# Patient Record
Sex: FEMALE | Race: WHITE
[De-identification: names, ages, dates, MRNs, and addresses within clinical notes are randomized per-mention and may not be internally consistent; named-entity substitution may affect disease eponyms.]

---

## 2021-11-24 ENCOUNTER — TRANSCRIPTION ENCOUNTER (OUTPATIENT)
Age: 71
End: 2021-11-24

## 2021-11-24 ENCOUNTER — INPATIENT (INPATIENT)
Facility: HOSPITAL | Age: 71
LOS: 5 days | Discharge: HOME | End: 2021-11-30
Attending: COLON & RECTAL SURGERY | Admitting: COLON & RECTAL SURGERY
Payer: MEDICARE

## 2021-11-24 VITALS
HEART RATE: 114 BPM | WEIGHT: 169.98 LBS | OXYGEN SATURATION: 96 % | TEMPERATURE: 97 F | SYSTOLIC BLOOD PRESSURE: 219 MMHG | DIASTOLIC BLOOD PRESSURE: 98 MMHG | RESPIRATION RATE: 18 BRPM

## 2021-11-24 LAB
ALBUMIN SERPL ELPH-MCNC: 4.5 G/DL — SIGNIFICANT CHANGE UP (ref 3.5–5.2)
ALP SERPL-CCNC: 96 U/L — SIGNIFICANT CHANGE UP (ref 30–115)
ALT FLD-CCNC: 22 U/L — SIGNIFICANT CHANGE UP (ref 0–41)
ANION GAP SERPL CALC-SCNC: 16 MMOL/L — HIGH (ref 7–14)
APPEARANCE UR: ABNORMAL
APTT BLD: 30.5 SEC — SIGNIFICANT CHANGE UP (ref 27–39.2)
AST SERPL-CCNC: 23 U/L — SIGNIFICANT CHANGE UP (ref 0–41)
BACTERIA # UR AUTO: ABNORMAL
BASOPHILS # BLD AUTO: 0.02 K/UL — SIGNIFICANT CHANGE UP (ref 0–0.2)
BASOPHILS NFR BLD AUTO: 0.2 % — SIGNIFICANT CHANGE UP (ref 0–1)
BILIRUB SERPL-MCNC: 1.4 MG/DL — HIGH (ref 0.2–1.2)
BILIRUB UR-MCNC: ABNORMAL
BLD GP AB SCN SERPL QL: SIGNIFICANT CHANGE UP
BLD GP AB SCN SERPL QL: SIGNIFICANT CHANGE UP
BUN SERPL-MCNC: 19 MG/DL — SIGNIFICANT CHANGE UP (ref 10–20)
CALCIUM SERPL-MCNC: 9.2 MG/DL — SIGNIFICANT CHANGE UP (ref 8.5–10.1)
CHLORIDE SERPL-SCNC: 92 MMOL/L — LOW (ref 98–110)
CO2 SERPL-SCNC: 21 MMOL/L — SIGNIFICANT CHANGE UP (ref 17–32)
COLOR SPEC: ABNORMAL
CREAT SERPL-MCNC: 0.9 MG/DL — SIGNIFICANT CHANGE UP (ref 0.7–1.5)
DIFF PNL FLD: NEGATIVE — SIGNIFICANT CHANGE UP
EOSINOPHIL # BLD AUTO: 0 K/UL — SIGNIFICANT CHANGE UP (ref 0–0.7)
EOSINOPHIL NFR BLD AUTO: 0 % — SIGNIFICANT CHANGE UP (ref 0–8)
EPI CELLS # UR: 3 /HPF — SIGNIFICANT CHANGE UP (ref 0–5)
GLUCOSE SERPL-MCNC: 155 MG/DL — HIGH (ref 70–99)
GLUCOSE UR QL: ABNORMAL
HCT VFR BLD CALC: 47.6 % — HIGH (ref 37–47)
HGB BLD-MCNC: 16.7 G/DL — HIGH (ref 12–16)
HYALINE CASTS # UR AUTO: 13 /LPF — HIGH (ref 0–7)
IMM GRANULOCYTES NFR BLD AUTO: 0.4 % — HIGH (ref 0.1–0.3)
INR BLD: 1.14 RATIO — SIGNIFICANT CHANGE UP (ref 0.65–1.3)
KETONES UR-MCNC: NEGATIVE — SIGNIFICANT CHANGE UP
LACTATE SERPL-SCNC: 1.6 MMOL/L — SIGNIFICANT CHANGE UP (ref 0.7–2)
LEUKOCYTE ESTERASE UR-ACNC: NEGATIVE — SIGNIFICANT CHANGE UP
LIDOCAIN IGE QN: 60 U/L — SIGNIFICANT CHANGE UP (ref 7–60)
LYMPHOCYTES # BLD AUTO: 0.9 K/UL — LOW (ref 1.2–3.4)
LYMPHOCYTES # BLD AUTO: 7.4 % — LOW (ref 20.5–51.1)
MCHC RBC-ENTMCNC: 28.9 PG — SIGNIFICANT CHANGE UP (ref 27–31)
MCHC RBC-ENTMCNC: 35.1 G/DL — SIGNIFICANT CHANGE UP (ref 32–37)
MCV RBC AUTO: 82.5 FL — SIGNIFICANT CHANGE UP (ref 81–99)
MONOCYTES # BLD AUTO: 0.85 K/UL — HIGH (ref 0.1–0.6)
MONOCYTES NFR BLD AUTO: 7 % — SIGNIFICANT CHANGE UP (ref 1.7–9.3)
NEUTROPHILS # BLD AUTO: 10.38 K/UL — HIGH (ref 1.4–6.5)
NEUTROPHILS NFR BLD AUTO: 85 % — HIGH (ref 42.2–75.2)
NITRITE UR-MCNC: NEGATIVE — SIGNIFICANT CHANGE UP
NRBC # BLD: 0 /100 WBCS — SIGNIFICANT CHANGE UP (ref 0–0)
PH UR: 6 — SIGNIFICANT CHANGE UP (ref 5–8)
PLATELET # BLD AUTO: 308 K/UL — SIGNIFICANT CHANGE UP (ref 130–400)
POTASSIUM SERPL-MCNC: 4 MMOL/L — SIGNIFICANT CHANGE UP (ref 3.5–5)
POTASSIUM SERPL-SCNC: 4 MMOL/L — SIGNIFICANT CHANGE UP (ref 3.5–5)
PROT SERPL-MCNC: 6.8 G/DL — SIGNIFICANT CHANGE UP (ref 6–8)
PROT UR-MCNC: ABNORMAL
PROTHROM AB SERPL-ACNC: 13.1 SEC — HIGH (ref 9.95–12.87)
RAPID RVP RESULT: SIGNIFICANT CHANGE UP
RBC # BLD: 5.77 M/UL — HIGH (ref 4.2–5.4)
RBC # FLD: 13.2 % — SIGNIFICANT CHANGE UP (ref 11.5–14.5)
RBC CASTS # UR COMP ASSIST: 3 /HPF — SIGNIFICANT CHANGE UP (ref 0–4)
SARS-COV-2 RNA SPEC QL NAA+PROBE: SIGNIFICANT CHANGE UP
SODIUM SERPL-SCNC: 129 MMOL/L — LOW (ref 135–146)
SP GR SPEC: 1.04 — HIGH (ref 1.01–1.03)
UROBILINOGEN FLD QL: ABNORMAL
WBC # BLD: 12.2 K/UL — HIGH (ref 4.8–10.8)
WBC # FLD AUTO: 12.2 K/UL — HIGH (ref 4.8–10.8)
WBC UR QL: 47 /HPF — HIGH (ref 0–5)

## 2021-11-24 PROCEDURE — 71045 X-RAY EXAM CHEST 1 VIEW: CPT | Mod: 26

## 2021-11-24 PROCEDURE — 99221 1ST HOSP IP/OBS SF/LOW 40: CPT

## 2021-11-24 PROCEDURE — 99285 EMERGENCY DEPT VISIT HI MDM: CPT

## 2021-11-24 PROCEDURE — 44188 LAP COLOSTOMY: CPT

## 2021-11-24 PROCEDURE — 99284 EMERGENCY DEPT VISIT MOD MDM: CPT | Mod: 57

## 2021-11-24 PROCEDURE — 93010 ELECTROCARDIOGRAM REPORT: CPT

## 2021-11-24 PROCEDURE — 74177 CT ABD & PELVIS W/CONTRAST: CPT | Mod: 26,MA

## 2021-11-24 PROCEDURE — 99223 1ST HOSP IP/OBS HIGH 75: CPT

## 2021-11-24 RX ORDER — ENOXAPARIN SODIUM 100 MG/ML
40 INJECTION SUBCUTANEOUS DAILY
Refills: 0 | Status: DISCONTINUED | OUTPATIENT
Start: 2021-11-24 | End: 2021-11-30

## 2021-11-24 RX ORDER — SODIUM CHLORIDE 9 MG/ML
1000 INJECTION INTRAMUSCULAR; INTRAVENOUS; SUBCUTANEOUS ONCE
Refills: 0 | Status: COMPLETED | OUTPATIENT
Start: 2021-11-24 | End: 2021-11-24

## 2021-11-24 RX ORDER — PANTOPRAZOLE SODIUM 20 MG/1
40 TABLET, DELAYED RELEASE ORAL
Refills: 0 | Status: DISCONTINUED | OUTPATIENT
Start: 2021-11-24 | End: 2021-11-30

## 2021-11-24 RX ORDER — SODIUM CHLORIDE 9 MG/ML
1000 INJECTION, SOLUTION INTRAVENOUS
Refills: 0 | Status: DISCONTINUED | OUTPATIENT
Start: 2021-11-24 | End: 2021-11-24

## 2021-11-24 RX ORDER — HYDROMORPHONE HYDROCHLORIDE 2 MG/ML
0.5 INJECTION INTRAMUSCULAR; INTRAVENOUS; SUBCUTANEOUS EVERY 6 HOURS
Refills: 0 | Status: DISCONTINUED | OUTPATIENT
Start: 2021-11-24 | End: 2021-11-30

## 2021-11-24 RX ORDER — SODIUM CHLORIDE 9 MG/ML
1000 INJECTION, SOLUTION INTRAVENOUS
Refills: 0 | Status: DISCONTINUED | OUTPATIENT
Start: 2021-11-24 | End: 2021-11-26

## 2021-11-24 RX ORDER — ACETAMINOPHEN 500 MG
650 TABLET ORAL EVERY 6 HOURS
Refills: 0 | Status: DISCONTINUED | OUTPATIENT
Start: 2021-11-24 | End: 2021-11-30

## 2021-11-24 RX ORDER — CHLORHEXIDINE GLUCONATE 213 G/1000ML
1 SOLUTION TOPICAL
Refills: 0 | Status: DISCONTINUED | OUTPATIENT
Start: 2021-11-24 | End: 2021-11-30

## 2021-11-24 RX ORDER — PANTOPRAZOLE SODIUM 20 MG/1
40 TABLET, DELAYED RELEASE ORAL ONCE
Refills: 0 | Status: COMPLETED | OUTPATIENT
Start: 2021-11-24 | End: 2021-11-24

## 2021-11-24 RX ORDER — PANTOPRAZOLE SODIUM 20 MG/1
40 TABLET, DELAYED RELEASE ORAL
Refills: 0 | Status: DISCONTINUED | OUTPATIENT
Start: 2021-11-24 | End: 2021-11-24

## 2021-11-24 RX ORDER — HYDROMORPHONE HYDROCHLORIDE 2 MG/ML
0.5 INJECTION INTRAMUSCULAR; INTRAVENOUS; SUBCUTANEOUS
Refills: 0 | Status: DISCONTINUED | OUTPATIENT
Start: 2021-11-24 | End: 2021-11-24

## 2021-11-24 RX ORDER — HYDRALAZINE HCL 50 MG
10 TABLET ORAL ONCE
Refills: 0 | Status: COMPLETED | OUTPATIENT
Start: 2021-11-24 | End: 2021-11-24

## 2021-11-24 RX ORDER — INFLUENZA VIRUS VACCINE 15; 15; 15; 15 UG/.5ML; UG/.5ML; UG/.5ML; UG/.5ML
0.7 SUSPENSION INTRAMUSCULAR ONCE
Refills: 0 | Status: DISCONTINUED | OUTPATIENT
Start: 2021-11-24 | End: 2021-11-30

## 2021-11-24 RX ORDER — HEPARIN SODIUM 5000 [USP'U]/ML
5000 INJECTION INTRAVENOUS; SUBCUTANEOUS EVERY 8 HOURS
Refills: 0 | Status: DISCONTINUED | OUTPATIENT
Start: 2021-11-24 | End: 2021-11-24

## 2021-11-24 RX ORDER — SODIUM CHLORIDE 9 MG/ML
1000 INJECTION, SOLUTION INTRAVENOUS ONCE
Refills: 0 | Status: COMPLETED | OUTPATIENT
Start: 2021-11-24 | End: 2021-11-24

## 2021-11-24 RX ORDER — LABETALOL HCL 100 MG
10 TABLET ORAL ONCE
Refills: 0 | Status: DISCONTINUED | OUTPATIENT
Start: 2021-11-24 | End: 2021-11-24

## 2021-11-24 RX ORDER — LABETALOL HCL 100 MG
5 TABLET ORAL ONCE
Refills: 0 | Status: COMPLETED | OUTPATIENT
Start: 2021-11-24 | End: 2021-11-24

## 2021-11-24 RX ORDER — KETOROLAC TROMETHAMINE 30 MG/ML
15 SYRINGE (ML) INJECTION EVERY 8 HOURS
Refills: 0 | Status: DISCONTINUED | OUTPATIENT
Start: 2021-11-24 | End: 2021-11-24

## 2021-11-24 RX ORDER — CEFOTETAN DISODIUM 1 G
2 VIAL (EA) INJECTION EVERY 12 HOURS
Refills: 0 | Status: COMPLETED | OUTPATIENT
Start: 2021-11-24 | End: 2021-11-25

## 2021-11-24 RX ORDER — LABETALOL HCL 100 MG
5 TABLET ORAL ONCE
Refills: 0 | Status: DISCONTINUED | OUTPATIENT
Start: 2021-11-24 | End: 2021-11-24

## 2021-11-24 RX ADMIN — SODIUM CHLORIDE 1000 MILLILITER(S): 9 INJECTION INTRAMUSCULAR; INTRAVENOUS; SUBCUTANEOUS at 16:55

## 2021-11-24 RX ADMIN — HYDROMORPHONE HYDROCHLORIDE 0.5 MILLIGRAM(S): 2 INJECTION INTRAMUSCULAR; INTRAVENOUS; SUBCUTANEOUS at 22:30

## 2021-11-24 RX ADMIN — HYDROMORPHONE HYDROCHLORIDE 0.5 MILLIGRAM(S): 2 INJECTION INTRAMUSCULAR; INTRAVENOUS; SUBCUTANEOUS at 22:15

## 2021-11-24 RX ADMIN — HYDROMORPHONE HYDROCHLORIDE 0.5 MILLIGRAM(S): 2 INJECTION INTRAMUSCULAR; INTRAVENOUS; SUBCUTANEOUS at 22:05

## 2021-11-24 RX ADMIN — SODIUM CHLORIDE 150 MILLILITER(S): 9 INJECTION, SOLUTION INTRAVENOUS at 23:01

## 2021-11-24 RX ADMIN — Medication 15 MILLIGRAM(S): at 23:15

## 2021-11-24 RX ADMIN — PANTOPRAZOLE SODIUM 40 MILLIGRAM(S): 20 TABLET, DELAYED RELEASE ORAL at 16:32

## 2021-11-24 RX ADMIN — SODIUM CHLORIDE 1000 MILLILITER(S): 9 INJECTION INTRAMUSCULAR; INTRAVENOUS; SUBCUTANEOUS at 15:50

## 2021-11-24 RX ADMIN — Medication 10 MILLIGRAM(S): at 18:40

## 2021-11-24 RX ADMIN — Medication 5 MILLIGRAM(S): at 15:51

## 2021-11-24 RX ADMIN — SODIUM CHLORIDE 1000 MILLILITER(S): 9 INJECTION, SOLUTION INTRAVENOUS at 12:11

## 2021-11-24 RX ADMIN — Medication 15 MILLIGRAM(S): at 22:56

## 2021-11-24 RX ADMIN — SODIUM CHLORIDE 125 MILLILITER(S): 9 INJECTION, SOLUTION INTRAVENOUS at 22:26

## 2021-11-24 NOTE — ED PROVIDER NOTE - GASTROINTESTINAL, MLM
Abdomen soft, mildly distended, non-tender, no rebound, no guarding.  No CVA tenderness bilaterally.

## 2021-11-24 NOTE — H&P ADULT - ASSESSMENT
ASSESSMENT:  71yF who hasn't seen PCP in 40 years presents with distended abdomen, and not passing flatus and bowel movements. CT revealed sigmoid mass with large bowel obstruction. Of note right breast mass and left breast nodule seen.    PLAN:  - Pre-Op  - Clearances   - NGT to LCWS  - NPO  - IVF  - OR for laparoscopic creation of transverse loop colostomy       Above plan discussed with Attending Surgeon Dr. Fritz  11-24-21 @ 16:35

## 2021-11-24 NOTE — H&P ADULT - ATTENDING COMMENTS
71F with no known medical history who presents with abdominal pain distention constipation.  Found to have large bowel obstruction due to sigmoid mass and breast mass on CTAP.    Patient seen and examined.  Patient reports abdominal pain.    Abdomen - soft, distended, non tender.  No rebound or guarding    Vitals labs and images reviewed    WBC 12.2    CTAP - Large bowel obstruction secondary to a likely malignant stricture in the proximal sigmoid colon. Please correlate with correlate endoscopy.  Small volume ascites.  Lobulated, partially visualized right breast mass and left breast nodule. Physical exam correlation and correlation with dedicated breast imaging is recommended as malignancy is of concern.    Plan  - NPO  - IVF  - Pre operative labs  - To OR emergently for laparoscopic creation of loop colostomy.  All risks benefits and alternatives discussed with the patient. She was in agreement with the plan and signed surgical consent.

## 2021-11-24 NOTE — ED PROVIDER NOTE - OBJECTIVE STATEMENT
71 y.o. female without any known PMH (hasn't seen a DrLexis in 41 years) presented to the ER c/o abdominal bloating, poor appetite, and constipation for the past 4-5 days.  (+) nausea with vomiting Sat. which resolved.  Pt denies fever, chills, abdominal pain, dysuria, flank pain, hematuria, hematemesis, melena, chest pain, dizziness, SOB.  No other complaints.  Py referred to ER by [facility].

## 2021-11-24 NOTE — CONSULT NOTE ADULT - SUBJECTIVE AND OBJECTIVE BOX
KYLE BOYKININE  71y  Female      Patient is a 71y old  Female who presents with a chief complaint of Abd pain.      INTERVAL HPI/OVERNIGHT EVENTS:      ******************************* REVIEW OF SYSTEMS:**********************************************    All other review of systems negative    *********************** VITALS ******************************************    T(F): 97.5 (21 @ 18:20)  HR: 85 (21 @ 18:24) (85 - 114)  BP: 209/101 (21 @ 18:24) (121/101 - 230/110)  RR: 18 (21 @ 18:20) (17 - 18)  SpO2: 100% (21 @ 18:20) (96% - 100%)            ******************************** PHYSICAL EXAM:**************************************************  GENERAL: NAD    PSYCH: no agitation, baseline mentation  HEENT:     NERVOUS SYSTEM:  Alert & Oriented X3, MS  5/5 B/L  UE and LE ; Sensory intact    PULMONARY: SANDEEP, CTA    CARDIOVASCULAR: S1S2 RRR    GI: Soft, NT, Distended; BS present.    EXTREMITIES:  2+ Peripheral Pulses, No clubbing, cyanosis, or edema    LYMPH: No lymphadenopathy noted    SKIN: No rashes or lesions      **************************** LABS *******************************************************                          16.7   12.20 )-----------( 308      ( 2021 13:25 )             47.6     1124    129<L>  |  92<L>  |  19  ----------------------------<  155<H>  4.0   |  21  |  0.9    Ca    9.2      2021 11:30    TPro  6.8  /  Alb  4.5  /  TBili  1.4<H>  /  DBili  x   /  AST  23  /  ALT  22  /  AlkPhos  96  24      Urinalysis Basic - ( 2021 11:30 )    Color: Radha / Appearance: Slightly Turbid / S.036 / pH: x  Gluc: x / Ketone: Negative  / Bili: Small / Urobili: 6 mg/dL   Blood: x / Protein: 100 mg/dL / Nitrite: Negative   Leuk Esterase: Negative / RBC: 3 /HPF / WBC 47 /HPF   Sq Epi: x / Non Sq Epi: 3 /HPF / Bacteria: Few      PT/INR - ( 2021 15:55 )   PT: 13.10 sec;   INR: 1.14 ratio         PTT - ( 2021 15:55 )  PTT:30.5 sec  Lactate Trend   @ 11:30 Lactate:1.6         CAPILLARY BLOOD GLUCOSE              **************************Active Medications *******************************************  No Known Allergies      heparin   Injectable 5000 Unit(s) SubCutaneous every 8 hours  influenza  Vaccine (HIGH DOSE) 0.7 milliLiter(s) IntraMuscular once      ***************************************************  RADIOLOGY & ADDITIONAL TESTS:    Imaging Personally Reviewed:  [ ] YES  [ ] NO    HEALTH ISSUES - PROBLEM Dx:

## 2021-11-24 NOTE — CONSULT NOTE ADULT - SUBJECTIVE AND OBJECTIVE BOX
Gastroenterology Consultation:    Patient is a 71y old  Female who presents with a chief complaint of abdominal pain    Admitted on: 11-24-21  HPI:  71 y.o. female without any known PMH (hasn't seen a DrLexis in 41 years) presented to the ER c/o abdominal bloating, poor appetite, and constipation for the past 4-5 days. As per patient her last BM was last Thursday or Friday. She also endorses she dose not pass gas since jody time. She also complains of  (+) nausea with vomiting Sat. which resolved.  Pt denies fever, chills, abdominal pain, dysuria, flank pain, hematuria, hematemesis, melena, chest pain, dizziness, SOB.  No other complaints.      Prior EGD: Never  Prior Colonoscopy: never      PAST MEDICAL & SURGICAL HISTORY:      FAMILY HISTORY:  Thyroid cancer son    Social History:  Tobacco: N  Alcohol: N  Drugs: N    Home Medications:    MEDICATIONS  (STANDING):  heparin   Injectable 5000 Unit(s) SubCutaneous every 8 hours  pantoprazole  Injectable 40 milliGRAM(s) IV Push Once    MEDICATIONS  (PRN):      Allergies  No Known Allergies      Review of Systems:   Constitutional:  No Fever, No Chills  ENT/Mouth:  No Hearing Changes,  No Difficulty Swallowing  Eyes:  No Eye Pain, No Vision Changes  Cardiovascular:  No Chest Pain, No Palpitations  Respiratory:  No Cough, No Dyspnea  Gastrointestinal:  As described in HPI  Musculoskeletal:  No Joint Swelling, No Back Pain  Skin:  No Skin Lesions, No Jaundice  Neuro:  No Syncope, No Dizziness  Heme/Lymph:  No Bruising, No Bleeding.          Physical Examination:  T(C): 36.2 (11-24-21 @ 10:28), Max: 36.2 (11-24-21 @ 10:28)  HR: 85 (11-24-21 @ 15:59) (85 - 114)  BP: 180/99 (11-24-21 @ 15:59) (180/99 - 230/110)  RR: 18 (11-24-21 @ 15:59) (17 - 18)  SpO2: 97% (11-24-21 @ 15:59) (96% - 98%)    Weight (kg): 77.1 (11-24-21 @ 10:28)      Constitutional: No acute distress.  Eyes:. Conjunctivae are clear, Sclera is non-icteric.  Ears Nose and Throat: The external ears are normal appearing,  Oral mucosa is pink and moist.  Respiratory:  No signs of respiratory distress. Lung sounds are clear bilaterally.  Cardiovascular:  S1 S2, Regular rate and rhythm.  GI: Abdomen is soft, symmetric, and non-tender with mild distention. There are no visible lesions. Bowel sounds are present and normoactive in all four quadrants. No masses, hepatomegaly, or splenomegaly are noted.   Neuro: No Tremor, No involuntary movements  Skin: No rashes, No Jaundice.          Data: (reviewed by attending)                        16.7   12.20 )-----------( 308      ( 24 Nov 2021 13:25 )             47.6     Hgb Trend:  16.7  11-24-21 @ 13:25      11-24    129<L>  |  92<L>  |  19  ----------------------------<  155<H>  4.0   |  21  |  0.9    Ca    9.2      24 Nov 2021 11:30    TPro  6.8  /  Alb  4.5  /  TBili  1.4<H>  /  DBili  x   /  AST  23  /  ALT  22  /  AlkPhos  96  11-24    Liver panel trend:  TBili 1.4   /   AST 23   /   ALT 22   /   AlkP 96   /   Tptn 6.8   /   Alb 4.5    /   DBili --      11-24      PT/INR - ( 24 Nov 2021 15:55 )   PT: 13.10 sec;   INR: 1.14 ratio         PTT - ( 24 Nov 2021 15:55 )  PTT:30.5 sec        Radiology:(reviewed by attending)  CT Abdomen and Pelvis w/ IV Cont:   EXAM:  CT ABDOMEN AND PELVIS IC            PROCEDURE DATE:  11/24/2021            INTERPRETATION:  CLINICAL INFORMATION: Abdominal pain    COMPARISON: None.    PROCEDURE:  CT of the Abdomen and Pelvis was performed with intravenous contrast.  Intravenous contrast: 100 ml Omnipaque 350. 0 ml discarded.  Oral contrast: None.  Sagittal and coronal reformats were performed.    FINDINGS:    LOWER CHEST: Partially imaged lobulated right breast mass with adjacent nodular infiltrative change measures at least 6 x 4 cm. A smooth bordered left breast nodule measures 2.8 x 2.7 cm.    LIVER: Segment 2 cyst.  BILE DUCTS: Normal caliber.  GALLBLADDER: Within normal limits.  SPLEEN: Within normal limits.  PANCREAS: Within normal limits.  ADRENALS: Within normal limits.  KIDNEYS/URETERS: Within normal limits.    BLADDER: Within normal limits.  REPRODUCTIVE ORGANS: The uterus and adnexa are within normal limits.    BOWEL: Liquid stool containing colon is distended and thickened with a maximum cecal diameter of 8.8 cm. There is a 5 cm segment of masslike stricturing in the proximal sigmoid colon. A tiny adjacent lymph node is noted in the sigmoid mesentery. The distal sigmoid colon and rectum are decompressed. No pneumatosis. Appendix is normal.  PERITONEUM: Mild ascites. No fluid collection or free air.  VESSELS:  Atherosclerotic calcifications. Retroaortic left renal vein.  RETROPERITONEUM: No lymphadenopathy.  ABDOMINAL WALL: Within normal limits.  BONES: Degenerative changes of the spine.    IMPRESSION: Large bowel obstruction secondary to a likely malignant stricture in the proximal sigmoid colon. Please correlate with correlate endoscopy.    Small volume ascites.    Lobulated, partially visualized right breast mass and left breast nodule. Physical exam correlation and correlation with dedicated breast imaging is recommended as malignancy is of concern.    Findings discussed with ERWIN Machuca by Dr. Cochran on 11/24/2021 at 2:15 PM  with read back.      --- End of Report ---              KATARINA COCHRAN MD; Attending Radiologist  This document has been electronically signed. Nov 24 2021  2:22PM (11-24-21 @ 13:54)

## 2021-11-24 NOTE — CONSULT NOTE ADULT - SUBJECTIVE AND OBJECTIVE BOX
HPI:  HPI: 70 y/o F who hasn't seen a doctor in 40 years came to the ED with intermittent abdominal pain. Patient reports abd pain started last Friday also hasn't had a bowel movement since friday. Patient also had 2x episode of vomiting on Saturday and Sunday. Came to the ED as abdominal pain has not subsided. No fever, chills, rigors, SOB.    Patient is able to walk more than 3-4 blocks and climb two flights of stairs without symptoms    PAST MEDICAL & SURGICAL HISTORY  no medical follow-up    FAMILY HISTORY:  HTN (father)  DL (father)      SOCIAL HISTORY:  []smoker  []Alcohol  []Drug    ALLERGIES:  No Known Allergies      MEDICATIONS:  MEDICATIONS  (STANDING):  heparin   Injectable 5000 Unit(s) SubCutaneous every 8 hours  hydrALAZINE Injectable 10 milliGRAM(s) IV Push once  influenza  Vaccine (HIGH DOSE) 0.7 milliLiter(s) IntraMuscular once    MEDICATIONS  (PRN):      HOME MEDICATIONS:  no meds      VITALS:   T(F): 97.5 (11-24 @ 18:20), Max: 97.5 (11-24 @ 18:20)  HR: 85 (11-24 @ 18:24) (85 - 114)  BP: 209/101 (11-24 @ 18:24) (121/101 - 230/110)  BP(mean): --  RR: 18 (11-24 @ 18:20) (17 - 18)  SpO2: 100% (11-24 @ 18:20) (96% - 100%)    I&O's Summary      REVIEW OF SYSTEMS:  CONSTITUTIONAL: No weakness, fevers or chills  EYES: No visual changes  ENT: No vertigo or throat pain   NECK: No pain or stiffness  RESPIRATORY: No cough, wheezing, hemoptysis; No shortness of breath  CARDIOVASCULAR: No chest pain or palpitations  GASTROINTESTINAL: (+) abdominal pain. (+) nausea, vomiting, no hematemesis; No diarrhea (+) constipation. No melena or hematochezia.  GENITOURINARY: No dysuria, frequency or hematuria  NEUROLOGICAL: No numbness or weakness  SKIN: No itching, no rashes  MSK: No pain    PHYSICAL EXAM:  NEURO: patient is awake , alert and oriented  GEN: Not in acute distress  NECK: no thyroid enlargement, no JVD  LUNGS: Clear to auscultation bilaterally   CARDIOVASCULAR: S1/S2 present, RRR , no murmurs or rubs, no carotid bruits,  + PP bilaterally  ABD: Soft, tender,  mildly distended, +BS  EXT: No ABDULKADIR  SKIN: Intact    LABS:                        16.7   12.20 )-----------( 308      ( 24 Nov 2021 13:25 )             47.6     11-24    129<L>  |  92<L>  |  19  ----------------------------<  155<H>  4.0   |  21  |  0.9    Ca    9.2      24 Nov 2021 11:30    TPro  6.8  /  Alb  4.5  /  TBili  1.4<H>  /  DBili  x   /  AST  23  /  ALT  22  /  AlkPhos  96  11-24    PT/INR - ( 24 Nov 2021 15:55 )   PT: 13.10 sec;   INR: 1.14 ratio         PTT - ( 24 Nov 2021 15:55 )  PTT:30.5 sec  Lactate, Blood: 1.6 mmol/L (11-24-21 @ 11:30)    RADIOLOGY:  -CXR: no acute pathology    -TTE: pending      ECG:  sinus tach 104, non-specific ST-T changes     HPI:    HPI: 72 y/o F who hasn't seen a doctor in 40 years came to the ED with intermittent abdominal pain. Patient reports abd pain started last Friday also hasn't had a bowel movement since friday. Patient also had 2x episode of vomiting on Saturday and Sunday. Came to the ED as abdominal pain has not subsided. No fever, chills, rigors, SOB.    Patient is able to walk more than 3-4 blocks and climb two flights of stairs without symptoms.    PAST MEDICAL & SURGICAL HISTORY  no medical follow-up    FAMILY HISTORY:  HTN (father)  DL (father)      SOCIAL HISTORY:  []smoker  []Alcohol  []Drug    ALLERGIES:  No Known Allergies      MEDICATIONS:  MEDICATIONS  (STANDING):  heparin   Injectable 5000 Unit(s) SubCutaneous every 8 hours  hydrALAZINE Injectable 10 milliGRAM(s) IV Push once  influenza  Vaccine (HIGH DOSE) 0.7 milliLiter(s) IntraMuscular once    MEDICATIONS  (PRN):      HOME MEDICATIONS:  no meds      VITALS:   T(F): 97.5 (11-24 @ 18:20), Max: 97.5 (11-24 @ 18:20)  HR: 85 (11-24 @ 18:24) (85 - 114)  BP: 209/101 (11-24 @ 18:24) (121/101 - 230/110)  BP(mean): --  RR: 18 (11-24 @ 18:20) (17 - 18)  SpO2: 100% (11-24 @ 18:20) (96% - 100%)    I&O's Summary      REVIEW OF SYSTEMS:  CONSTITUTIONAL: No weakness, fevers or chills  EYES: No visual changes  ENT: No vertigo or throat pain   NECK: No pain or stiffness  RESPIRATORY: No cough, wheezing, hemoptysis; No shortness of breath  CARDIOVASCULAR: No chest pain or palpitations  GASTROINTESTINAL: (+) abdominal pain. (+) nausea, vomiting, no hematemesis; No diarrhea (+) constipation. No melena or hematochezia.  GENITOURINARY: No dysuria, frequency or hematuria  NEUROLOGICAL: No numbness or weakness  SKIN: No itching, no rashes  MSK: No pain    PHYSICAL EXAM:  NEURO: patient is awake , alert and oriented  GEN: Not in acute distress  NECK: no thyroid enlargement, no JVD  LUNGS: Clear to auscultation bilaterally   CARDIOVASCULAR: S1/S2 present, RRR , no murmurs or rubs, no carotid bruits,  + PP bilaterally  ABD: Soft, tender,  mildly distended, +BS  EXT: No ABDULKADIR  SKIN: Intact    LABS:                        16.7   12.20 )-----------( 308      ( 24 Nov 2021 13:25 )             47.6     11-24    129<L>  |  92<L>  |  19  ----------------------------<  155<H>  4.0   |  21  |  0.9    Ca    9.2      24 Nov 2021 11:30    TPro  6.8  /  Alb  4.5  /  TBili  1.4<H>  /  DBili  x   /  AST  23  /  ALT  22  /  AlkPhos  96  11-24    PT/INR - ( 24 Nov 2021 15:55 )   PT: 13.10 sec;   INR: 1.14 ratio         PTT - ( 24 Nov 2021 15:55 )  PTT:30.5 sec  Lactate, Blood: 1.6 mmol/L (11-24-21 @ 11:30)    RADIOLOGY:  -CXR: no acute pathology    -TTE: pending      ECG:  sinus tach 104, non-specific ST-T changes

## 2021-11-24 NOTE — H&P ADULT - NSHPLABSRESULTS_GEN_ALL_CORE
LAB/STUDIES:                        16.7   12.20 )-----------( 308      ( 2021 13:25 )             47.6     11-24    129<L>  |  92<L>  |  19  ----------------------------<  155<H>  4.0   |  21  |  0.9    Ca    9.2      2021 11:30    TPro  6.8  /  Alb  4.5  /  TBili  1.4<H>  /  DBili  x   /  AST  23  /  ALT  22  /  AlkPhos  96  11-24    PT/INR - ( 2021 15:55 )   PT: 13.10 sec;   INR: 1.14 ratio         PTT - ( 2021 15:55 )  PTT:30.5 sec  LIVER FUNCTIONS - ( 2021 11:30 )  Alb: 4.5 g/dL / Pro: 6.8 g/dL / ALK PHOS: 96 U/L / ALT: 22 U/L / AST: 23 U/L / GGT: x           Urinalysis Basic - ( 2021 11:30 )    Color: Radha / Appearance: Slightly Turbid / S.036 / pH: x  Gluc: x / Ketone: Negative  / Bili: Small / Urobili: 6 mg/dL   Blood: x / Protein: 100 mg/dL / Nitrite: Negative   Leuk Esterase: Negative / RBC: 3 /HPF / WBC 47 /HPF   Sq Epi: x / Non Sq Epi: 3 /HPF / Bacteria: Few                  IMAGING:    < from: CT Abdomen and Pelvis w/ IV Cont (21 @ 13:54) >    IMPRESSION: Large bowel obstruction secondary to a likely malignant stricture in the proximal sigmoid colon. Please correlate with correlate endoscopy.    Small volume ascites.    Lobulated, partially visualized right breast mass and left breast nodule. Physical exam correlation and correlation with dedicated breast imaging is recommended as malignancy is of concern.    < end of copied text >

## 2021-11-24 NOTE — ED PROVIDER NOTE - PROGRESS NOTE DETAILS
spoke to sx, coming down to see pt CT scan shows large bowel obstruction and colon and breast lesions. Results discussed with patient. Surgery bedside.

## 2021-11-24 NOTE — ED ADULT NURSE NOTE - OBJECTIVE STATEMENT
Pt presents to the Ed with abdominal pain 4/10 sharp. Pt endorsed having forceful vomiting on friday. has felt nauseous since. Has not been eating nor drinking enough.

## 2021-11-24 NOTE — CONSULT NOTE ADULT - ATTENDING COMMENTS
IMPRESSION:    - Large bowel obstruction needs diverting colostomy with possible bowel resection  - METS>4, good functional status  - No chest pain, no BELCHER, no orthopnea, no PND, no palpitations, no ABDULKADIR  - No family history of CAD, non smoker  - Hypertension to 230/110 s/p labetalol - 180/99mmhg now (BP can be a chronic hypertension that was undiagnosed or reactive due to stress/pain/news about her condition)      PLAN:    - Moderate risk patient for a moderate risk surgery.  - Can use labetalol IV as needed for BP control pre-op, start Labetalol 200 mg q12 PO once able to take PO  - Post-op, if patient remains hypertensive, can add CCB (nifedipine ER 60mg daily po)  - obtain TTE

## 2021-11-24 NOTE — ED PROVIDER NOTE - CLINICAL SUMMARY MEDICAL DECISION MAKING FREE TEXT BOX
Patient presents with abdominal pain. labs, ua, ct done. Found to have large bowel obstruction and colon and breast mass. Results discussed. Surgery consulted. Patient admitted to surgery for further management.

## 2021-11-24 NOTE — H&P ADULT - NSHPPHYSICALEXAM_GEN_ALL_CORE
VITALS:  T(F): 97.1 (11-24-21 @ 10:28), Max: 97.1 (11-24-21 @ 10:28)  HR: 85 (11-24-21 @ 15:59) (85 - 114)  BP: 180/99 (11-24-21 @ 15:59) (180/99 - 230/110)  RR: 18 (11-24-21 @ 15:59) (17 - 18)  SpO2: 97% (11-24-21 @ 15:59) (96% - 98%)    PHYSICAL EXAM:  General: NAD, AAOx3, calm and cooperative  HEENT: NCAT, LEOBARDO, EOMI, Trachea ML, Neck supple  Cardiac: RRR S1, S2, no Murmurs, rubs or gallops  Respiratory: CTAB, normal respiratory effort, breath sounds equal BL  Abdomen: Soft, moderately distended, non-tender no rebound, no guarding. +BS.  Musculoskeletal: Strength equal BL UE/LE, ROM intact  Skin: Warm/dry, normal color, no jaundice  Rectal: Good tone, +stool, + blood.    MEDICATIONS  (STANDING):  heparin   Injectable 5000 Unit(s) SubCutaneous every 8 hours    MEDICATIONS  (PRN):

## 2021-11-24 NOTE — ED PROVIDER NOTE - ATTENDING CONTRIBUTION TO CARE
70 yo F no pmh (does not follow with doctors) presents with abdominal pain. States that for the last few days she has been having nausea and vomiting that has improved. Also reports 4 days of constipation which is unusual for her. Having intermittent sharp periumbilical abdominal pain. no fevers. no urinary symptoms. no chest pain, no shortness of breath, no palpitations. no surgeries in the past. no similar episodes in the past.     CONSTITUTIONAL: Well-developed; well-nourished; in no acute distress.   SKIN: warm, dry  HEAD: Normocephalic; atraumatic.  EYES: PERRL, EOMI, no conjunctival erythema  ENT: No nasal discharge; airway clear.  NECK: Supple; non tender.  CARD: S1, S2 normal;  Regular rate and rhythm.   RESP: No wheezes, rales or rhonchi.  ABD: soft non tender, + distension, no rebound or guarding  EXT: Normal ROM.  5/5 strength in all 4 extremities   LYMPH: No acute cervical adenopathy.  NEURO: Alert, oriented, grossly unremarkable. neurovascularly intact  PSYCH: Cooperative, appropriate.

## 2021-11-24 NOTE — BRIEF OPERATIVE NOTE - OPERATION/FINDINGS
Extensive mobilization of transverse colon, hepatic flexure brought up to RUQ ostomy, decompression with pool suction.

## 2021-11-24 NOTE — H&P ADULT - HISTORY OF PRESENT ILLNESS
GENERAL SURGERY CONSULT NOTE    Patient: ABHINAV BOYKIN , 71y ([birth year])Female   MRN: [id number]  Location: [facility] [address] A  Visit: 11-24-21 Inpatient  Date: 11-24-21 @ 16:35    HPI: 72 y/o F who hasn't seen a doctor in 40 years came to the ED with intermittent abdominal pain. Patient reports abd pain started last Friday also hasn't had a bowel movement since friday. Patient also had 2x episode of vomiting on Saturday and Sunday. Came to the ED as abdominal pain has not subsided. No fever, chills, rigors, SOB.    PAST MEDICAL & SURGICAL HISTORY:       Home Medications:  none

## 2021-11-25 LAB
ANION GAP SERPL CALC-SCNC: 16 MMOL/L — HIGH (ref 7–14)
ANION GAP SERPL CALC-SCNC: 17 MMOL/L — HIGH (ref 7–14)
BASOPHILS # BLD AUTO: 0.01 K/UL — SIGNIFICANT CHANGE UP (ref 0–0.2)
BASOPHILS # BLD AUTO: 0.03 K/UL — SIGNIFICANT CHANGE UP (ref 0–0.2)
BASOPHILS NFR BLD AUTO: 0.1 % — SIGNIFICANT CHANGE UP (ref 0–1)
BASOPHILS NFR BLD AUTO: 0.3 % — SIGNIFICANT CHANGE UP (ref 0–1)
BUN SERPL-MCNC: 13 MG/DL — SIGNIFICANT CHANGE UP (ref 10–20)
BUN SERPL-MCNC: 9 MG/DL — LOW (ref 10–20)
CALCIUM SERPL-MCNC: 7.8 MG/DL — LOW (ref 8.5–10.1)
CALCIUM SERPL-MCNC: 8 MG/DL — LOW (ref 8.5–10.1)
CHLORIDE SERPL-SCNC: 100 MMOL/L — SIGNIFICANT CHANGE UP (ref 98–110)
CHLORIDE SERPL-SCNC: 104 MMOL/L — SIGNIFICANT CHANGE UP (ref 98–110)
CO2 SERPL-SCNC: 22 MMOL/L — SIGNIFICANT CHANGE UP (ref 17–32)
CO2 SERPL-SCNC: 22 MMOL/L — SIGNIFICANT CHANGE UP (ref 17–32)
CREAT SERPL-MCNC: 0.7 MG/DL — SIGNIFICANT CHANGE UP (ref 0.7–1.5)
CREAT SERPL-MCNC: 0.8 MG/DL — SIGNIFICANT CHANGE UP (ref 0.7–1.5)
EOSINOPHIL # BLD AUTO: 0 K/UL — SIGNIFICANT CHANGE UP (ref 0–0.7)
EOSINOPHIL # BLD AUTO: 0.03 K/UL — SIGNIFICANT CHANGE UP (ref 0–0.7)
EOSINOPHIL NFR BLD AUTO: 0 % — SIGNIFICANT CHANGE UP (ref 0–8)
EOSINOPHIL NFR BLD AUTO: 0.3 % — SIGNIFICANT CHANGE UP (ref 0–8)
GLUCOSE SERPL-MCNC: 110 MG/DL — HIGH (ref 70–99)
GLUCOSE SERPL-MCNC: 123 MG/DL — HIGH (ref 70–99)
HCT VFR BLD CALC: 41.4 % — SIGNIFICANT CHANGE UP (ref 37–47)
HCT VFR BLD CALC: 43.2 % — SIGNIFICANT CHANGE UP (ref 37–47)
HCV AB S/CO SERPL IA: 0.03 COI — SIGNIFICANT CHANGE UP
HCV AB SERPL-IMP: SIGNIFICANT CHANGE UP
HGB BLD-MCNC: 14 G/DL — SIGNIFICANT CHANGE UP (ref 12–16)
HGB BLD-MCNC: 14.4 G/DL — SIGNIFICANT CHANGE UP (ref 12–16)
IMM GRANULOCYTES NFR BLD AUTO: 0.3 % — SIGNIFICANT CHANGE UP (ref 0.1–0.3)
IMM GRANULOCYTES NFR BLD AUTO: 0.5 % — HIGH (ref 0.1–0.3)
LYMPHOCYTES # BLD AUTO: 0.67 K/UL — LOW (ref 1.2–3.4)
LYMPHOCYTES # BLD AUTO: 0.68 K/UL — LOW (ref 1.2–3.4)
LYMPHOCYTES # BLD AUTO: 6.5 % — LOW (ref 20.5–51.1)
LYMPHOCYTES # BLD AUTO: 7.7 % — LOW (ref 20.5–51.1)
MAGNESIUM SERPL-MCNC: 2 MG/DL — SIGNIFICANT CHANGE UP (ref 1.8–2.4)
MAGNESIUM SERPL-MCNC: 2 MG/DL — SIGNIFICANT CHANGE UP (ref 1.8–2.4)
MCHC RBC-ENTMCNC: 28 PG — SIGNIFICANT CHANGE UP (ref 27–31)
MCHC RBC-ENTMCNC: 28.3 PG — SIGNIFICANT CHANGE UP (ref 27–31)
MCHC RBC-ENTMCNC: 33.3 G/DL — SIGNIFICANT CHANGE UP (ref 32–37)
MCHC RBC-ENTMCNC: 33.8 G/DL — SIGNIFICANT CHANGE UP (ref 32–37)
MCV RBC AUTO: 83.6 FL — SIGNIFICANT CHANGE UP (ref 81–99)
MCV RBC AUTO: 84 FL — SIGNIFICANT CHANGE UP (ref 81–99)
MONOCYTES # BLD AUTO: 0.62 K/UL — HIGH (ref 0.1–0.6)
MONOCYTES # BLD AUTO: 0.81 K/UL — HIGH (ref 0.1–0.6)
MONOCYTES NFR BLD AUTO: 7 % — SIGNIFICANT CHANGE UP (ref 1.7–9.3)
MONOCYTES NFR BLD AUTO: 7.9 % — SIGNIFICANT CHANGE UP (ref 1.7–9.3)
NEUTROPHILS # BLD AUTO: 7.49 K/UL — HIGH (ref 1.4–6.5)
NEUTROPHILS # BLD AUTO: 8.75 K/UL — HIGH (ref 1.4–6.5)
NEUTROPHILS NFR BLD AUTO: 84.4 % — HIGH (ref 42.2–75.2)
NEUTROPHILS NFR BLD AUTO: 85 % — HIGH (ref 42.2–75.2)
NRBC # BLD: 0 /100 WBCS — SIGNIFICANT CHANGE UP (ref 0–0)
NRBC # BLD: 0 /100 WBCS — SIGNIFICANT CHANGE UP (ref 0–0)
PHOSPHATE SERPL-MCNC: 3 MG/DL — SIGNIFICANT CHANGE UP (ref 2.1–4.9)
PHOSPHATE SERPL-MCNC: 4.1 MG/DL — SIGNIFICANT CHANGE UP (ref 2.1–4.9)
PLATELET # BLD AUTO: 238 K/UL — SIGNIFICANT CHANGE UP (ref 130–400)
PLATELET # BLD AUTO: 238 K/UL — SIGNIFICANT CHANGE UP (ref 130–400)
POTASSIUM SERPL-MCNC: 3.3 MMOL/L — LOW (ref 3.5–5)
POTASSIUM SERPL-MCNC: 3.6 MMOL/L — SIGNIFICANT CHANGE UP (ref 3.5–5)
POTASSIUM SERPL-SCNC: 3.3 MMOL/L — LOW (ref 3.5–5)
POTASSIUM SERPL-SCNC: 3.6 MMOL/L — SIGNIFICANT CHANGE UP (ref 3.5–5)
RBC # BLD: 4.95 M/UL — SIGNIFICANT CHANGE UP (ref 4.2–5.4)
RBC # BLD: 5.14 M/UL — SIGNIFICANT CHANGE UP (ref 4.2–5.4)
RBC # FLD: 13.5 % — SIGNIFICANT CHANGE UP (ref 11.5–14.5)
RBC # FLD: 13.6 % — SIGNIFICANT CHANGE UP (ref 11.5–14.5)
SODIUM SERPL-SCNC: 138 MMOL/L — SIGNIFICANT CHANGE UP (ref 135–146)
SODIUM SERPL-SCNC: 143 MMOL/L — SIGNIFICANT CHANGE UP (ref 135–146)
WBC # BLD: 10.29 K/UL — SIGNIFICANT CHANGE UP (ref 4.8–10.8)
WBC # BLD: 8.88 K/UL — SIGNIFICANT CHANGE UP (ref 4.8–10.8)
WBC # FLD AUTO: 10.29 K/UL — SIGNIFICANT CHANGE UP (ref 4.8–10.8)
WBC # FLD AUTO: 8.88 K/UL — SIGNIFICANT CHANGE UP (ref 4.8–10.8)

## 2021-11-25 PROCEDURE — 99222 1ST HOSP IP/OBS MODERATE 55: CPT

## 2021-11-25 RX ORDER — NIFEDIPINE 30 MG
60 TABLET, EXTENDED RELEASE 24 HR ORAL DAILY
Refills: 0 | Status: DISCONTINUED | OUTPATIENT
Start: 2021-11-25 | End: 2021-11-27

## 2021-11-25 RX ADMIN — Medication 650 MILLIGRAM(S): at 12:20

## 2021-11-25 RX ADMIN — SODIUM CHLORIDE 150 MILLILITER(S): 9 INJECTION, SOLUTION INTRAVENOUS at 17:16

## 2021-11-25 RX ADMIN — Medication 650 MILLIGRAM(S): at 00:06

## 2021-11-25 RX ADMIN — Medication 650 MILLIGRAM(S): at 12:18

## 2021-11-25 RX ADMIN — Medication 650 MILLIGRAM(S): at 05:24

## 2021-11-25 RX ADMIN — Medication 650 MILLIGRAM(S): at 00:32

## 2021-11-25 RX ADMIN — Medication 2.5 MILLIGRAM(S): at 17:37

## 2021-11-25 RX ADMIN — Medication 100 GRAM(S): at 17:42

## 2021-11-25 RX ADMIN — ENOXAPARIN SODIUM 40 MILLIGRAM(S): 100 INJECTION SUBCUTANEOUS at 12:19

## 2021-11-25 RX ADMIN — Medication 650 MILLIGRAM(S): at 23:35

## 2021-11-25 RX ADMIN — Medication 60 MILLIGRAM(S): at 05:25

## 2021-11-25 RX ADMIN — PANTOPRAZOLE SODIUM 40 MILLIGRAM(S): 20 TABLET, DELAYED RELEASE ORAL at 05:25

## 2021-11-25 RX ADMIN — Medication 650 MILLIGRAM(S): at 17:39

## 2021-11-25 RX ADMIN — Medication 650 MILLIGRAM(S): at 17:36

## 2021-11-25 RX ADMIN — Medication 100 GRAM(S): at 05:25

## 2021-11-25 RX ADMIN — Medication 650 MILLIGRAM(S): at 23:03

## 2021-11-25 NOTE — PROGRESS NOTE ADULT - ASSESSMENT
ASSESSMENT:  71F who presented with LBO 2/2 sigmoid mass who is now POD 1 s/p laparoscopic transverse loop colostomy     PLAN:  - NGT, [facility]  - 24H abx  - FU med, Card recs  - NGT to LCWS  - IVF  - FU Ostomy output    Raymond Melendrez MD  General Surgery PGY-1  [place] TEAM SPECT[facility] [id number]

## 2021-11-25 NOTE — CONSULT NOTE ADULT - SUBJECTIVE AND OBJECTIVE BOX
GENERAL SURGERY CONSULT NOTE    Patient: ABHINAV BOYKIN , 71y ([birth year])Female   MRN: [id number]  Location: [address] 008 A  Visit: 21 Inpatient  Date: 21 @ 08:19    HPI:  Breast SURGERY CONSULT NOTE    Patient: ABHINAV BOYKIN , 71y ([birth year])Female   MRN: [id number]  Location: [facility] ER Hold 006 A  Visit: 21 Inpatient  Date: 21 @ 16:35    HPI: 70 y/o F who hasn't seen a doctor in 40 years came to the ED with intermittent abdominal pain. Patient reports abd pain started last Friday also hasn't had a bowel movement since friday. Patient also had 2x episode of vomiting on Saturday and . Came to the ED as abdominal pain has not subsided. No fever, chills, rigors, SOB.    Breast surgery consulted for right breast mass and left breast nodule noted on CT scan.     PAST MEDICAL & SURGICAL HISTORY:       Home Medications:  none   (2021 16:34)      PAST MEDICAL & SURGICAL HISTORY:      Home Medications:        VITALS:  T(F): 96.3 (21 @ 05:37), Max: 98.3 (21 @ 19:21)  HR: 95 (21 @ 05:37) (81 - 114)  BP: 173/80 (21 @ 05:37) (109/55 - 230/110)  RR: 18 (21 @ 05:37) (15 - 22)  SpO2: 95% (21 @ 05:37) (95% - 100%)    PHYSICAL EXAM:  General: NAD, AAOx3, calm and cooperative  HEENT: NCAT, LEOBARDO, EOMI, Trachea ML, Neck supple  Cardiac: RRR S1, S2, no Murmurs, rubs or gallops  Respiratory: CTAB, normal respiratory effort, breath sounds equal BL, no wheeze, rhonchi or crackles    MEDICATIONS  (STANDING):  acetaminophen     Tablet .. 650 milliGRAM(s) Oral every 6 hours  cefoTEtan  IVPB 2 Gram(s) IV Intermittent every 12 hours  chlorhexidine 4% Liquid 1 Application(s) Topical <User Schedule>  enoxaparin Injectable 40 milliGRAM(s) SubCutaneous daily  influenza  Vaccine (HIGH DOSE) 0.7 milliLiter(s) IntraMuscular once  lactated ringers. 1000 milliLiter(s) (150 mL/Hr) IV Continuous <Continuous>  NIFEdipine XL 60 milliGRAM(s) Oral daily  pantoprazole    Tablet 40 milliGRAM(s) Oral before breakfast    MEDICATIONS  (PRN):  HYDROmorphone  Injectable 0.5 milliGRAM(s) IV Push every 6 hours PRN Severe Pain (7 - 10)  ketorolac   Injectable 15 milliGRAM(s) IV Push every 8 hours PRN Moderate Pain (4 - 6)      LAB/STUDIES:                        14.0   10.29 )-----------( 238      ( 2021 04:30 )             41.4     11-24    129<L>  |  92<L>  |  19  ----------------------------<  155<H>  4.0   |  21  |  0.9    Ca    9.2      2021 11:30    TPro  6.8  /  Alb  4.5  /  TBili  1.4<H>  /  DBili  x   /  AST  23  /  ALT  22  /  AlkPhos  96  11-24    PT/INR - ( 2021 15:55 )   PT: 13.10 sec;   INR: 1.14 ratio         PTT - ( 2021 15:55 )  PTT:30.5 sec  LIVER FUNCTIONS - ( 2021 11:30 )  Alb: 4.5 g/dL / Pro: 6.8 g/dL / ALK PHOS: 96 U/L / ALT: 22 U/L / AST: 23 U/L / GGT: x           Urinalysis Basic - ( 2021 11:30 )    Color: Radha / Appearance: Slightly Turbid / S.036 / pH: x  Gluc: x / Ketone: Negative  / Bili: Small / Urobili: 6 mg/dL   Blood: x / Protein: 100 mg/dL / Nitrite: Negative   Leuk Esterase: Negative / RBC: 3 /HPF / WBC 47 /HPF   Sq Epi: x / Non Sq Epi: 3 /HPF / Bacteria: Few      IMAGING:  < from: CT Abdomen and Pelvis w/ IV Cont (21 @ 13:54) >  IMPRESSION: Large bowel obstruction secondary to a likely malignant stricture in the proximal sigmoid colon. Please correlate with correlate endoscopy.    Small volume ascites.    Lobulated, partially visualized right breast mass and left breast nodule. Physical exam correlation and correlation with dedicated breast imaging is recommended as malignancy is of concern.    Findings discussed with ERWIN Machuca by Dr. Collazo on 2021 at 2:15 PM  with read back.    < end of copied text >      ACCESS DEVICES:  [x ] Peripheral IV  [ ] Central Venous Line	[ ] R	[ ] L	[ ] IJ	[ ] Fem	[ ] SC	Placed:   [ ] Arterial Line		[ ] R	[ ] L	[ ] Fem	[ ] Rad	[ ] Ax	Placed:   [ ] PICC:					[ ] Mediport  [ ] Urinary Catheter, Date Placed:

## 2021-11-25 NOTE — PROGRESS NOTE ADULT - ATTENDING COMMENTS
71F with no known medical history who presents with abdominal pain distention constipation.  Found to have large bowel obstruction due to sigmoid mass and breast mass on CTAP.    POD1 s/p laparoscopic creation of transverse loop colostomy    Patient seen and examined.  Patient reports incisional pain.    Abdomen - soft, non distended, obese, appropriately tender.  No rebound or guarding    Vitals labs and images reviewed    CTAP - Large bowel obstruction secondary to a likely malignant stricture in the proximal sigmoid colon. Please correlate with correlate endoscopy.  Small volume ascites.  Lobulated, partially visualized right breast mass and left breast nodule. Physical exam correlation and correlation with dedicated breast imaging is recommended as malignancy is of concern.    Plan  - NPO  - DC NGT  - IVF  - breast surgery consult for breast mass  - GI consult for flexible sigmoidoscopy and biopsy  - WOCN teaching  - DC stoma bar 11/29  - CT chest, CEA level

## 2021-11-26 ENCOUNTER — RESULT REVIEW (OUTPATIENT)
Age: 71
End: 2021-11-26

## 2021-11-26 LAB
ANION GAP SERPL CALC-SCNC: 17 MMOL/L — HIGH (ref 7–14)
BASOPHILS # BLD AUTO: 0.04 K/UL — SIGNIFICANT CHANGE UP (ref 0–0.2)
BASOPHILS NFR BLD AUTO: 0.4 % — SIGNIFICANT CHANGE UP (ref 0–1)
BUN SERPL-MCNC: 13 MG/DL — SIGNIFICANT CHANGE UP (ref 10–20)
CALCIUM SERPL-MCNC: 8.1 MG/DL — LOW (ref 8.5–10.1)
CHLORIDE SERPL-SCNC: 101 MMOL/L — SIGNIFICANT CHANGE UP (ref 98–110)
CO2 SERPL-SCNC: 23 MMOL/L — SIGNIFICANT CHANGE UP (ref 17–32)
COVID-19 NUCLEOCAPSID GAM AB INTERP: NEGATIVE — SIGNIFICANT CHANGE UP
COVID-19 NUCLEOCAPSID TOTAL GAM ANTIBODY RESULT: 0.08 INDEX — SIGNIFICANT CHANGE UP
COVID-19 SPIKE DOMAIN AB INTERP: POSITIVE
COVID-19 SPIKE DOMAIN ANTIBODY RESULT: 219 U/ML — HIGH
CREAT SERPL-MCNC: 0.8 MG/DL — SIGNIFICANT CHANGE UP (ref 0.7–1.5)
EOSINOPHIL # BLD AUTO: 0.16 K/UL — SIGNIFICANT CHANGE UP (ref 0–0.7)
EOSINOPHIL NFR BLD AUTO: 1.8 % — SIGNIFICANT CHANGE UP (ref 0–8)
GLUCOSE SERPL-MCNC: 130 MG/DL — HIGH (ref 70–99)
HCT VFR BLD CALC: 40.6 % — SIGNIFICANT CHANGE UP (ref 37–47)
HGB BLD-MCNC: 13.5 G/DL — SIGNIFICANT CHANGE UP (ref 12–16)
IMM GRANULOCYTES NFR BLD AUTO: 0.4 % — HIGH (ref 0.1–0.3)
LYMPHOCYTES # BLD AUTO: 0.72 K/UL — LOW (ref 1.2–3.4)
LYMPHOCYTES # BLD AUTO: 8 % — LOW (ref 20.5–51.1)
MAGNESIUM SERPL-MCNC: 2 MG/DL — SIGNIFICANT CHANGE UP (ref 1.8–2.4)
MCHC RBC-ENTMCNC: 28.4 PG — SIGNIFICANT CHANGE UP (ref 27–31)
MCHC RBC-ENTMCNC: 33.3 G/DL — SIGNIFICANT CHANGE UP (ref 32–37)
MCV RBC AUTO: 85.3 FL — SIGNIFICANT CHANGE UP (ref 81–99)
MONOCYTES # BLD AUTO: 0.63 K/UL — HIGH (ref 0.1–0.6)
MONOCYTES NFR BLD AUTO: 7 % — SIGNIFICANT CHANGE UP (ref 1.7–9.3)
NEUTROPHILS # BLD AUTO: 7.42 K/UL — HIGH (ref 1.4–6.5)
NEUTROPHILS NFR BLD AUTO: 82.4 % — HIGH (ref 42.2–75.2)
NRBC # BLD: 0 /100 WBCS — SIGNIFICANT CHANGE UP (ref 0–0)
PHOSPHATE SERPL-MCNC: 2.4 MG/DL — SIGNIFICANT CHANGE UP (ref 2.1–4.9)
PLATELET # BLD AUTO: 227 K/UL — SIGNIFICANT CHANGE UP (ref 130–400)
POTASSIUM SERPL-MCNC: 3.7 MMOL/L — SIGNIFICANT CHANGE UP (ref 3.5–5)
POTASSIUM SERPL-SCNC: 3.7 MMOL/L — SIGNIFICANT CHANGE UP (ref 3.5–5)
RBC # BLD: 4.76 M/UL — SIGNIFICANT CHANGE UP (ref 4.2–5.4)
RBC # FLD: 13.8 % — SIGNIFICANT CHANGE UP (ref 11.5–14.5)
SARS-COV-2 IGG+IGM SERPL QL IA: 0.08 INDEX — SIGNIFICANT CHANGE UP
SARS-COV-2 IGG+IGM SERPL QL IA: 219 U/ML — HIGH
SARS-COV-2 IGG+IGM SERPL QL IA: NEGATIVE — SIGNIFICANT CHANGE UP
SARS-COV-2 IGG+IGM SERPL QL IA: POSITIVE
SODIUM SERPL-SCNC: 141 MMOL/L — SIGNIFICANT CHANGE UP (ref 135–146)
WBC # BLD: 9.01 K/UL — SIGNIFICANT CHANGE UP (ref 4.8–10.8)
WBC # FLD AUTO: 9.01 K/UL — SIGNIFICANT CHANGE UP (ref 4.8–10.8)

## 2021-11-26 PROCEDURE — 71260 CT THORAX DX C+: CPT | Mod: 26

## 2021-11-26 PROCEDURE — 88360 TUMOR IMMUNOHISTOCHEM/MANUAL: CPT | Mod: 26

## 2021-11-26 PROCEDURE — 88305 TISSUE EXAM BY PATHOLOGIST: CPT | Mod: 26

## 2021-11-26 RX ORDER — HYDRALAZINE HCL 50 MG
10 TABLET ORAL ONCE
Refills: 0 | Status: COMPLETED | OUTPATIENT
Start: 2021-11-26 | End: 2021-11-26

## 2021-11-26 RX ORDER — POTASSIUM CHLORIDE 20 MEQ
20 PACKET (EA) ORAL ONCE
Refills: 0 | Status: COMPLETED | OUTPATIENT
Start: 2021-11-26 | End: 2021-11-26

## 2021-11-26 RX ORDER — POTASSIUM PHOSPHATE, MONOBASIC POTASSIUM PHOSPHATE, DIBASIC 236; 224 MG/ML; MG/ML
30 INJECTION, SOLUTION INTRAVENOUS ONCE
Refills: 0 | Status: COMPLETED | OUTPATIENT
Start: 2021-11-26 | End: 2021-11-27

## 2021-11-26 RX ADMIN — Medication 650 MILLIGRAM(S): at 05:13

## 2021-11-26 RX ADMIN — Medication 650 MILLIGRAM(S): at 12:59

## 2021-11-26 RX ADMIN — Medication 650 MILLIGRAM(S): at 11:04

## 2021-11-26 RX ADMIN — Medication 650 MILLIGRAM(S): at 23:10

## 2021-11-26 RX ADMIN — Medication 50 MILLIEQUIVALENT(S): at 01:21

## 2021-11-26 RX ADMIN — Medication 650 MILLIGRAM(S): at 18:22

## 2021-11-26 RX ADMIN — Medication 60 MILLIGRAM(S): at 05:13

## 2021-11-26 RX ADMIN — PANTOPRAZOLE SODIUM 40 MILLIGRAM(S): 20 TABLET, DELAYED RELEASE ORAL at 05:14

## 2021-11-26 RX ADMIN — Medication 650 MILLIGRAM(S): at 06:05

## 2021-11-26 RX ADMIN — ENOXAPARIN SODIUM 40 MILLIGRAM(S): 100 INJECTION SUBCUTANEOUS at 11:04

## 2021-11-26 RX ADMIN — Medication 10 MILLIGRAM(S): at 22:58

## 2021-11-26 RX ADMIN — Medication 20 MILLIEQUIVALENT(S): at 01:21

## 2021-11-26 NOTE — ADVANCED PRACTICE NURSE CONSULT - ASSESSMENT
Received patient on 4C, sitting up in chair at bedside, awake, A&Ox3, made aware of purpose of WOCN visit, agreeable to consult. Colostomy to RUQ w/ ConvaTec post-op  pouching system in place, barrier intact. Pouch gently removed from pt's abdomen w/ water moistened gauze.     Type of ostomy: Transverse loop colostomy  Location: RUQ  Saqib: white stomal support saqib exiting underneath stoma at 12 & 6 o'clock   Size: oval in shape, unable to be rounded out, measuring 1 1/2" x 2 1/4"   Mucosa: 50% dark red, slightly friable tissue, 50% covered w/ adherent tan-brown devitalized tissue, Colorectal MD (at 8285) made aware  Peristomal skin: intact  Mucocutaneous junction: intact  Abdominal contours: round, semi-soft   Output: small amount of liquid brown effluent present in removed pouch, + flatus from stoma during WOCN visit   Midline/lap sites/ drains: scattered abdominal lap sites ALIS, approximated w/ sugri-glue, c/d/i     Patient re-pouched w/ Rico 2 ¾” CeraPlus flat barrier #84159 (cut to 1 1/2"  x 2 1/4"), Rico Adapt flat CeraRing #[id number], and Quanah  2 ¾” drainable pouch w/ lock & roll closure #[id number]. Full ostomy lesson and Quanah colostomy instructional booklet provided to patient.    Impression:  Loop transverse colostomy to RUQ-given pt's stomal characteristics and abdominal topography, pt requires flat pouching system w/ use of flat ring to absorb excess moisture and protect peristomal skin w/ shrinking post-op stomal size    Patient ready & willing to learn ostomy care, fully attentive during pouch change and ostomy lesson, asking appropriate questions; able to verbalize key ostomy points as discussed during lesson. Patient shown how to open/close pouch to empty effluent & how to vent faltus from unfiltered pouch.  Instructed patient to practice pouch emptying & flatus venting w/ staff assistance as these skills are required prior to d/c home.    Reviewed with patient dietary restrictions, stomal obstruction s/s and prevention and lifestyle modifications (clothing, bathing, physical activity, etc).  Patient verbalized understanding of all information given.Written information regarding all above topics provided to patient.    Patient expressed feeling slightly overwhelmed with everything, much emotional support provided to patient.

## 2021-11-26 NOTE — PROGRESS NOTE ADULT - ASSESSMENT
ASSESSMENT:  71yF who hasn't seen PCP in 40 years presents with distended abdomen, and not passing flatus and bowel movements. CT revealed sigmoid mass with large bowel obstruction. Of note right breast mass and left breast nodule seen.    PLAN:  -continue management by colorectal team  - obtain core needle bx prior to discharge  - dvt and gi ppx   - monitor vitals  - strict I&O  -ambulate as tolerated    Lines/Tubes: PIV,

## 2021-11-26 NOTE — ADVANCED PRACTICE NURSE CONSULT - RECOMMEDATIONS
Pouch change: 	  -Gently remove pouch water moistened gauze   -Cleanse stoma site with water moistened gauze, gently pat dry  -Measure stoma, currently 1 1/2" x 2 1/4"  -Trace and cut current size on Bricelyn 2 ¾” CeraPlus flat barrier (#20319)  -Stretch Rico Adapt CeraRing (#9605) onto back of barrier  -Apply barrier to patient's skin  -Attach Rico 2 ¾” drainable lock and roll pouch (#83961) onto barrier  Empty pouch when 1/3 to no more than 1/2 full of effluent/flatus. Change pouching system q 3 - 4 days and prn for leaking. Next pouch change-Tues 11/30.    Plan of Care:  Informed by Colorectal MD team, patient to be possiblly d/c'ed to home w/ home nursing services this weekend. All d/c paperwork and supplies provided to patient. Supplies include:  Bricelyn  2 3/4” CeraPlus flat barrier #71894  Bricelyn 2 3/4” drainable pouch w/ gas filter & lock/roll closure #[id number] (beige), #14905 (transparent)  Rico Adapt flat CeraRing #[id number]  Bricelyn adhesive remover #6651  Rico Adapt stoma powder #3006  3M Cavilon no-sting barrier film #[id number]  Adapt lubricating deodorant #90756    Patient to follow with MD upon d/c for continued post-op management & care/treatment.  Patient given [facility] contact info and instructed to call w/ questions/concerns. With pt’s permission, enrolled patient in [employer] Secure Start Program. If patient remains in-patient until next week, WOCN to follow-up with patient on Tues 11/30 for additional ostomy teaching.  Questions or concerns, please contact EN[facility], Spectra #[phone].

## 2021-11-26 NOTE — PROGRESS NOTE ADULT - ATTENDING COMMENTS
71F with no known medical history who presents with abdominal pain distention constipation.  Found to have large bowel obstruction due to sigmoid mass and breast mass on CTAP.    POD2 s/p laparoscopic creation of transverse loop colostomy    Patient seen and examined.  Patient reports incisional pain.    Abdomen - soft, non distended, obese, appropriately tender.  No rebound or guarding    Vitals labs and images reviewed    CTAP - Large bowel obstruction secondary to a likely malignant stricture in the proximal sigmoid colon. Please correlate with correlate endoscopy.  Small volume ascites.  Lobulated, partially visualized right breast mass and left breast nodule. Physical exam correlation and correlation with dedicated breast imaging is recommended as malignancy is of concern.    Plan  - CLD  - IVF  - breast surgery consult for breast mass  - GI consult for flexible sigmoidoscopy and biopsy  - WOCN teaching  - DC stoma bar 11/29  - CT chest, CEA level 71F with no known medical history who presents with abdominal pain distention constipation.  Found to have large bowel obstruction due to sigmoid mass and breast mass on CTAP.    POD2 s/p laparoscopic creation of transverse loop colostomy    Patient seen and examined.  Patient reports incisional pain.    Abdomen - soft, non distended, obese, appropriately tender.  RUQ stoma with stoma bar in place. Superficial necrosis on the afferent side.  Productive of gas and stool.    Vitals labs and images reviewed    CTAP - Large bowel obstruction secondary to a likely malignant stricture in the proximal sigmoid colon. Please correlate with correlate endoscopy.  Small volume ascites.  Lobulated, partially visualized right breast mass and left breast nodule. Physical exam correlation and correlation with dedicated breast imaging is recommended as malignancy is of concern.    Plan  - CLD  - IVF  - breast surgery consult for breast mass  - GI consult for flexible sigmoidoscopy and biopsy  - monitor stoma, may need revision if it worsens  - WOCN teaching  - DC stoma bar 11/29  - CT chest, CEA level

## 2021-11-26 NOTE — PHYSICAL THERAPY INITIAL EVALUATION ADULT - GENERAL OBSERVATIONS, REHAB EVAL
8:00-8:30. chart reviewed. Pt received semi-poon at B/S, alert, oriented, able to follow multi-step instructions and agreeable to PT evaluation.  + IV, + colostomy, denies pain or discomfort, initial /86 . Nurse Gina made aware. Pt demonstrated baseline mobility, Pt requires supervision for overall function mobility, able to ambulate without AD, negotiate 9 steps X 2 using 1 HR under supervision, Pt is not candidate for PT at this time. Further PT referral PRN.

## 2021-11-26 NOTE — PROGRESS NOTE ADULT - ASSESSMENT
71F who presented with LBO 2/2 sigmoid mass who is s/p laparoscopic transverse loop colostomy. Pt evaluated at bedside AAOX3, NAD +ostomy output, +voiding.     PLAN:  - CLD  - Monitor Ins and Outs  - GI/DVT ppx  - pain control  - IS  - encourage ambulation  - CM x dispo      BLUE TEAM SPECTRA [phone]

## 2021-11-26 NOTE — PHYSICAL THERAPY INITIAL EVALUATION ADULT - PERTINENT HX OF CURRENT PROBLEM, REHAB EVAL
71yF who hasn't seen PCP in 40 years presents with distended abdomen, and not passing flatus and bowel movements. CT revealed sigmoid mass with large bowel obstruction. Of note right breast mass and left breast nodule seen.

## 2021-11-27 LAB — CEA SERPL-MCNC: 2.1 NG/ML — SIGNIFICANT CHANGE UP (ref 0–3.8)

## 2021-11-27 RX ORDER — HYDRALAZINE HCL 50 MG
10 TABLET ORAL ONCE
Refills: 0 | Status: COMPLETED | OUTPATIENT
Start: 2021-11-27 | End: 2021-11-27

## 2021-11-27 RX ORDER — NIFEDIPINE 30 MG
90 TABLET, EXTENDED RELEASE 24 HR ORAL DAILY
Refills: 0 | Status: DISCONTINUED | OUTPATIENT
Start: 2021-11-27 | End: 2021-11-30

## 2021-11-27 RX ADMIN — CHLORHEXIDINE GLUCONATE 1 APPLICATION(S): 213 SOLUTION TOPICAL at 10:10

## 2021-11-27 RX ADMIN — Medication 10 MILLIGRAM(S): at 22:28

## 2021-11-27 RX ADMIN — PANTOPRAZOLE SODIUM 40 MILLIGRAM(S): 20 TABLET, DELAYED RELEASE ORAL at 05:05

## 2021-11-27 RX ADMIN — Medication 650 MILLIGRAM(S): at 05:05

## 2021-11-27 RX ADMIN — ENOXAPARIN SODIUM 40 MILLIGRAM(S): 100 INJECTION SUBCUTANEOUS at 12:43

## 2021-11-27 RX ADMIN — Medication 60 MILLIGRAM(S): at 05:05

## 2021-11-27 RX ADMIN — Medication 650 MILLIGRAM(S): at 12:43

## 2021-11-27 RX ADMIN — POTASSIUM PHOSPHATE, MONOBASIC POTASSIUM PHOSPHATE, DIBASIC 83.33 MILLIMOLE(S): 236; 224 INJECTION, SOLUTION INTRAVENOUS at 02:52

## 2021-11-27 RX ADMIN — Medication 650 MILLIGRAM(S): at 13:10

## 2021-11-27 RX ADMIN — Medication 650 MILLIGRAM(S): at 23:21

## 2021-11-27 NOTE — PROGRESS NOTE ADULT - ASSESSMENT
71F who presented with LBO 2/2 sigmoid mass who is s/p laparoscopic transverse loop colostomy. Pt evaluated at bedside AAOX3, NAD +ostomy output, +voiding.     PLAN:  - CLD  - Monitor Ins and Outs  - GI/DVT ppx  - pain control  - IS  - encourage ambulation  - IVF  - breast surgery on board for breast mass  - GI consult for flexible sigmoidoscopy and biopsy  - monitor stoma, may need revision if it worsens. ostomy getting darker but not painful and still functioning, will continue to monitor  - WOCN teaching  - DC stoma bar 11/29  - CT chest negative for lung mets   - CEA level 2.1 negative    Blue Team [phone]

## 2021-11-27 NOTE — PROGRESS NOTE ADULT - ASSESSMENT
ASSESSMENT:  70 y/o female with right breast mass, s/p core needle biopsy     PLAN:   - f/u biopsy results  - pain control  - care per primary team    spectra [phone] ASSESSMENT:  70 y/o female with right breast mass, s/p core needle biopsy     PLAN:   - f/u biopsy results  - pain control  - care per primary team: please reconsult as needed    spectra [phone]

## 2021-11-28 LAB
ANION GAP SERPL CALC-SCNC: 15 MMOL/L — HIGH (ref 7–14)
ANION GAP SERPL CALC-SCNC: 16 MMOL/L — HIGH (ref 7–14)
APTT BLD: 29.4 SEC — SIGNIFICANT CHANGE UP (ref 27–39.2)
BASOPHILS # BLD AUTO: 0.04 K/UL — SIGNIFICANT CHANGE UP (ref 0–0.2)
BASOPHILS # BLD AUTO: 0.05 K/UL — SIGNIFICANT CHANGE UP (ref 0–0.2)
BASOPHILS NFR BLD AUTO: 0.5 % — SIGNIFICANT CHANGE UP (ref 0–1)
BASOPHILS NFR BLD AUTO: 0.6 % — SIGNIFICANT CHANGE UP (ref 0–1)
BUN SERPL-MCNC: 15 MG/DL — SIGNIFICANT CHANGE UP (ref 10–20)
BUN SERPL-MCNC: 9 MG/DL — LOW (ref 10–20)
CALCIUM SERPL-MCNC: 8 MG/DL — LOW (ref 8.5–10.1)
CALCIUM SERPL-MCNC: 8.2 MG/DL — LOW (ref 8.5–10.1)
CHLORIDE SERPL-SCNC: 100 MMOL/L — SIGNIFICANT CHANGE UP (ref 98–110)
CHLORIDE SERPL-SCNC: 102 MMOL/L — SIGNIFICANT CHANGE UP (ref 98–110)
CO2 SERPL-SCNC: 20 MMOL/L — SIGNIFICANT CHANGE UP (ref 17–32)
CO2 SERPL-SCNC: 22 MMOL/L — SIGNIFICANT CHANGE UP (ref 17–32)
CREAT SERPL-MCNC: 0.5 MG/DL — LOW (ref 0.7–1.5)
CREAT SERPL-MCNC: 0.7 MG/DL — SIGNIFICANT CHANGE UP (ref 0.7–1.5)
EOSINOPHIL # BLD AUTO: 0.32 K/UL — SIGNIFICANT CHANGE UP (ref 0–0.7)
EOSINOPHIL # BLD AUTO: 0.41 K/UL — SIGNIFICANT CHANGE UP (ref 0–0.7)
EOSINOPHIL NFR BLD AUTO: 3.7 % — SIGNIFICANT CHANGE UP (ref 0–8)
EOSINOPHIL NFR BLD AUTO: 5.1 % — SIGNIFICANT CHANGE UP (ref 0–8)
GLUCOSE SERPL-MCNC: 123 MG/DL — HIGH (ref 70–99)
GLUCOSE SERPL-MCNC: 98 MG/DL — SIGNIFICANT CHANGE UP (ref 70–99)
HCT VFR BLD CALC: 38.7 % — SIGNIFICANT CHANGE UP (ref 37–47)
HCT VFR BLD CALC: 39 % — SIGNIFICANT CHANGE UP (ref 37–47)
HGB BLD-MCNC: 12.8 G/DL — SIGNIFICANT CHANGE UP (ref 12–16)
HGB BLD-MCNC: 13 G/DL — SIGNIFICANT CHANGE UP (ref 12–16)
IMM GRANULOCYTES NFR BLD AUTO: 0.6 % — HIGH (ref 0.1–0.3)
IMM GRANULOCYTES NFR BLD AUTO: 0.7 % — HIGH (ref 0.1–0.3)
INR BLD: 1.17 RATIO — SIGNIFICANT CHANGE UP (ref 0.65–1.3)
LYMPHOCYTES # BLD AUTO: 0.68 K/UL — LOW (ref 1.2–3.4)
LYMPHOCYTES # BLD AUTO: 0.77 K/UL — LOW (ref 1.2–3.4)
LYMPHOCYTES # BLD AUTO: 7.8 % — LOW (ref 20.5–51.1)
LYMPHOCYTES # BLD AUTO: 9.6 % — LOW (ref 20.5–51.1)
MAGNESIUM SERPL-MCNC: 1.8 MG/DL — SIGNIFICANT CHANGE UP (ref 1.8–2.4)
MAGNESIUM SERPL-MCNC: 2.2 MG/DL — SIGNIFICANT CHANGE UP (ref 1.8–2.4)
MCHC RBC-ENTMCNC: 28.1 PG — SIGNIFICANT CHANGE UP (ref 27–31)
MCHC RBC-ENTMCNC: 29 PG — SIGNIFICANT CHANGE UP (ref 27–31)
MCHC RBC-ENTMCNC: 32.8 G/DL — SIGNIFICANT CHANGE UP (ref 32–37)
MCHC RBC-ENTMCNC: 33.6 G/DL — SIGNIFICANT CHANGE UP (ref 32–37)
MCV RBC AUTO: 85.7 FL — SIGNIFICANT CHANGE UP (ref 81–99)
MCV RBC AUTO: 86.2 FL — SIGNIFICANT CHANGE UP (ref 81–99)
MONOCYTES # BLD AUTO: 0.59 K/UL — SIGNIFICANT CHANGE UP (ref 0.1–0.6)
MONOCYTES # BLD AUTO: 0.62 K/UL — HIGH (ref 0.1–0.6)
MONOCYTES NFR BLD AUTO: 7.1 % — SIGNIFICANT CHANGE UP (ref 1.7–9.3)
MONOCYTES NFR BLD AUTO: 7.3 % — SIGNIFICANT CHANGE UP (ref 1.7–9.3)
NEUTROPHILS # BLD AUTO: 6.18 K/UL — SIGNIFICANT CHANGE UP (ref 1.4–6.5)
NEUTROPHILS # BLD AUTO: 7.01 K/UL — HIGH (ref 1.4–6.5)
NEUTROPHILS NFR BLD AUTO: 76.7 % — HIGH (ref 42.2–75.2)
NEUTROPHILS NFR BLD AUTO: 80.3 % — HIGH (ref 42.2–75.2)
NRBC # BLD: 0 /100 WBCS — SIGNIFICANT CHANGE UP (ref 0–0)
NRBC # BLD: 0 /100 WBCS — SIGNIFICANT CHANGE UP (ref 0–0)
PHOSPHATE SERPL-MCNC: 2.5 MG/DL — SIGNIFICANT CHANGE UP (ref 2.1–4.9)
PHOSPHATE SERPL-MCNC: 2.8 MG/DL — SIGNIFICANT CHANGE UP (ref 2.1–4.9)
PLATELET # BLD AUTO: 227 K/UL — SIGNIFICANT CHANGE UP (ref 130–400)
PLATELET # BLD AUTO: 266 K/UL — SIGNIFICANT CHANGE UP (ref 130–400)
POTASSIUM SERPL-MCNC: 3.3 MMOL/L — LOW (ref 3.5–5)
POTASSIUM SERPL-MCNC: 3.7 MMOL/L — SIGNIFICANT CHANGE UP (ref 3.5–5)
POTASSIUM SERPL-SCNC: 3.3 MMOL/L — LOW (ref 3.5–5)
POTASSIUM SERPL-SCNC: 3.7 MMOL/L — SIGNIFICANT CHANGE UP (ref 3.5–5)
PROTHROM AB SERPL-ACNC: 13.4 SEC — HIGH (ref 9.95–12.87)
RBC # BLD: 4.49 M/UL — SIGNIFICANT CHANGE UP (ref 4.2–5.4)
RBC # BLD: 4.55 M/UL — SIGNIFICANT CHANGE UP (ref 4.2–5.4)
RBC # FLD: 14 % — SIGNIFICANT CHANGE UP (ref 11.5–14.5)
RBC # FLD: 14.1 % — SIGNIFICANT CHANGE UP (ref 11.5–14.5)
SODIUM SERPL-SCNC: 137 MMOL/L — SIGNIFICANT CHANGE UP (ref 135–146)
SODIUM SERPL-SCNC: 138 MMOL/L — SIGNIFICANT CHANGE UP (ref 135–146)
WBC # BLD: 8.06 K/UL — SIGNIFICANT CHANGE UP (ref 4.8–10.8)
WBC # BLD: 8.72 K/UL — SIGNIFICANT CHANGE UP (ref 4.8–10.8)
WBC # FLD AUTO: 8.06 K/UL — SIGNIFICANT CHANGE UP (ref 4.8–10.8)
WBC # FLD AUTO: 8.72 K/UL — SIGNIFICANT CHANGE UP (ref 4.8–10.8)

## 2021-11-28 RX ORDER — MAGNESIUM SULFATE 500 MG/ML
2 VIAL (ML) INJECTION ONCE
Refills: 0 | Status: COMPLETED | OUTPATIENT
Start: 2021-11-28 | End: 2021-11-28

## 2021-11-28 RX ORDER — HYDRALAZINE HCL 50 MG
10 TABLET ORAL ONCE
Refills: 0 | Status: COMPLETED | OUTPATIENT
Start: 2021-11-28 | End: 2021-11-28

## 2021-11-28 RX ORDER — POTASSIUM CHLORIDE 20 MEQ
20 PACKET (EA) ORAL
Refills: 0 | Status: COMPLETED | OUTPATIENT
Start: 2021-11-28 | End: 2021-11-28

## 2021-11-28 RX ADMIN — Medication 650 MILLIGRAM(S): at 11:25

## 2021-11-28 RX ADMIN — Medication 50 MILLIEQUIVALENT(S): at 09:38

## 2021-11-28 RX ADMIN — Medication 50 MILLIEQUIVALENT(S): at 05:39

## 2021-11-28 RX ADMIN — Medication 50 MILLIEQUIVALENT(S): at 02:50

## 2021-11-28 RX ADMIN — Medication 10 MILLIGRAM(S): at 00:36

## 2021-11-28 RX ADMIN — Medication 90 MILLIGRAM(S): at 05:38

## 2021-11-28 RX ADMIN — CHLORHEXIDINE GLUCONATE 1 APPLICATION(S): 213 SOLUTION TOPICAL at 05:39

## 2021-11-28 RX ADMIN — PANTOPRAZOLE SODIUM 40 MILLIGRAM(S): 20 TABLET, DELAYED RELEASE ORAL at 05:38

## 2021-11-28 RX ADMIN — Medication 650 MILLIGRAM(S): at 05:39

## 2021-11-28 RX ADMIN — Medication 650 MILLIGRAM(S): at 17:29

## 2021-11-28 RX ADMIN — Medication 50 GRAM(S): at 12:46

## 2021-11-28 RX ADMIN — ENOXAPARIN SODIUM 40 MILLIGRAM(S): 100 INJECTION SUBCUTANEOUS at 11:25

## 2021-11-28 NOTE — PROGRESS NOTE ADULT - ATTENDING COMMENTS
71F with no known medical history who presents with abdominal pain distention constipation.  Found to have large bowel obstruction due to sigmoid mass and breast mass on CTAP.    POD4 s/p laparoscopic creation of transverse loop colostomy    Patient seen and examined.  Patient reports incisional pain.    Abdomen - soft, non distended, obese, appropriately tender.  RUQ stoma with stoma bar in place. Viable afferent and efferent opening with necrosis of bridging colon. Productive of gas and stool.    Vitals labs and images reviewed    CTAP - Large bowel obstruction secondary to a likely malignant stricture in the proximal sigmoid colon. Please correlate with correlate endoscopy.  Small volume ascites.  Lobulated, partially visualized right breast mass and left breast nodule. Physical exam correlation and correlation with dedicated breast imaging is recommended as malignancy is of concern.  CEA 2.1    CT chest - 1. Compared with the previous scan of the abdomen pelvis dated 11/24/2021, the previously noted right and left breast masses are again identified. The right breast mass measures 6.5 x 3.8 x 7.0 cm and contains small calcifications. The right breast mass measures 3.1 x 2.9 x 2.6 cm.    2. There are no suspicious lung nodules.    Plan  - LFD  - IVF  - appreciate breast surgery input  - GI consult for flexible sigmoidoscopy and biopsy  - [facility] teaching  - DC stoma bar 11/29

## 2021-11-28 NOTE — PROGRESS NOTE ADULT - ASSESSMENT
ASSESSMENT:  71F who presented with LBO 2/2 sigmoid mass who is s/p laparoscopic transverse loop colostomy. Pt evaluated at bedside AAOX3, NAD +ostomy output, +voiding.     PLAN:  -Low fiber diet  -Monitor BP   -Increase nifedipine to 90mg qd   -GI/DVT ppx  -Pain control  -IS  -Ambulation as tolerated   -F/u breast bx  -F/u GI consult for flexible sigmoidoscopy and biopsy  -Monitor stoma, may need revision if it worsens. ostomy getting darker but not painful and still functioning, will continue to monitor  -DC stoma bar 11/29  -CT chest negative for lung mets   -CEA level 2.1 negative    BLUE TEAM SPECTRA: [phone]

## 2021-11-29 ENCOUNTER — RESULT REVIEW (OUTPATIENT)
Age: 71
End: 2021-11-29

## 2021-11-29 ENCOUNTER — TRANSCRIPTION ENCOUNTER (OUTPATIENT)
Age: 71
End: 2021-11-29

## 2021-11-29 LAB
ANION GAP SERPL CALC-SCNC: 17 MMOL/L — HIGH (ref 7–14)
BASOPHILS # BLD AUTO: 0.08 K/UL — SIGNIFICANT CHANGE UP (ref 0–0.2)
BASOPHILS NFR BLD AUTO: 0.9 % — SIGNIFICANT CHANGE UP (ref 0–1)
BLD GP AB SCN SERPL QL: SIGNIFICANT CHANGE UP
BUN SERPL-MCNC: 11 MG/DL — SIGNIFICANT CHANGE UP (ref 10–20)
CALCIUM SERPL-MCNC: 8.2 MG/DL — LOW (ref 8.5–10.1)
CHLORIDE SERPL-SCNC: 104 MMOL/L — SIGNIFICANT CHANGE UP (ref 98–110)
CO2 SERPL-SCNC: 18 MMOL/L — SIGNIFICANT CHANGE UP (ref 17–32)
CREAT SERPL-MCNC: 0.7 MG/DL — SIGNIFICANT CHANGE UP (ref 0.7–1.5)
EOSINOPHIL # BLD AUTO: 0.34 K/UL — SIGNIFICANT CHANGE UP (ref 0–0.7)
EOSINOPHIL NFR BLD AUTO: 3.9 % — SIGNIFICANT CHANGE UP (ref 0–8)
GLUCOSE SERPL-MCNC: 120 MG/DL — HIGH (ref 70–99)
HCT VFR BLD CALC: 41.7 % — SIGNIFICANT CHANGE UP (ref 37–47)
HGB BLD-MCNC: 13.6 G/DL — SIGNIFICANT CHANGE UP (ref 12–16)
IMM GRANULOCYTES NFR BLD AUTO: 0.6 % — HIGH (ref 0.1–0.3)
LYMPHOCYTES # BLD AUTO: 0.76 K/UL — LOW (ref 1.2–3.4)
LYMPHOCYTES # BLD AUTO: 8.7 % — LOW (ref 20.5–51.1)
MAGNESIUM SERPL-MCNC: 2 MG/DL — SIGNIFICANT CHANGE UP (ref 1.8–2.4)
MCHC RBC-ENTMCNC: 28.2 PG — SIGNIFICANT CHANGE UP (ref 27–31)
MCHC RBC-ENTMCNC: 32.6 G/DL — SIGNIFICANT CHANGE UP (ref 32–37)
MCV RBC AUTO: 86.5 FL — SIGNIFICANT CHANGE UP (ref 81–99)
MONOCYTES # BLD AUTO: 0.57 K/UL — SIGNIFICANT CHANGE UP (ref 0.1–0.6)
MONOCYTES NFR BLD AUTO: 6.5 % — SIGNIFICANT CHANGE UP (ref 1.7–9.3)
NEUTROPHILS # BLD AUTO: 6.93 K/UL — HIGH (ref 1.4–6.5)
NEUTROPHILS NFR BLD AUTO: 79.4 % — HIGH (ref 42.2–75.2)
NRBC # BLD: 0 /100 WBCS — SIGNIFICANT CHANGE UP (ref 0–0)
PHOSPHATE SERPL-MCNC: 2.8 MG/DL — SIGNIFICANT CHANGE UP (ref 2.1–4.9)
PLATELET # BLD AUTO: 331 K/UL — SIGNIFICANT CHANGE UP (ref 130–400)
POTASSIUM SERPL-MCNC: 4 MMOL/L — SIGNIFICANT CHANGE UP (ref 3.5–5)
POTASSIUM SERPL-SCNC: 4 MMOL/L — SIGNIFICANT CHANGE UP (ref 3.5–5)
RBC # BLD: 4.82 M/UL — SIGNIFICANT CHANGE UP (ref 4.2–5.4)
RBC # FLD: 13.8 % — SIGNIFICANT CHANGE UP (ref 11.5–14.5)
SARS-COV-2 RNA SPEC QL NAA+PROBE: SIGNIFICANT CHANGE UP
SODIUM SERPL-SCNC: 139 MMOL/L — SIGNIFICANT CHANGE UP (ref 135–146)
WBC # BLD: 8.73 K/UL — SIGNIFICANT CHANGE UP (ref 4.8–10.8)
WBC # FLD AUTO: 8.73 K/UL — SIGNIFICANT CHANGE UP (ref 4.8–10.8)

## 2021-11-29 PROCEDURE — 88342 IMHCHEM/IMCYTCHM 1ST ANTB: CPT | Mod: 26

## 2021-11-29 PROCEDURE — 45380 COLONOSCOPY AND BIOPSY: CPT | Mod: XU,53

## 2021-11-29 PROCEDURE — 45381 COLONOSCOPY SUBMUCOUS NJX: CPT | Mod: 53

## 2021-11-29 PROCEDURE — 88341 IMHCHEM/IMCYTCHM EA ADD ANTB: CPT | Mod: 26

## 2021-11-29 RX ORDER — POTASSIUM PHOSPHATE, MONOBASIC POTASSIUM PHOSPHATE, DIBASIC 236; 224 MG/ML; MG/ML
30 INJECTION, SOLUTION INTRAVENOUS ONCE
Refills: 0 | Status: COMPLETED | OUTPATIENT
Start: 2021-11-29 | End: 2021-11-29

## 2021-11-29 RX ORDER — SODIUM CHLORIDE 9 MG/ML
1000 INJECTION, SOLUTION INTRAVENOUS
Refills: 0 | Status: DISCONTINUED | OUTPATIENT
Start: 2021-11-29 | End: 2021-11-29

## 2021-11-29 RX ADMIN — Medication 650 MILLIGRAM(S): at 11:12

## 2021-11-29 RX ADMIN — Medication 650 MILLIGRAM(S): at 05:28

## 2021-11-29 RX ADMIN — POTASSIUM PHOSPHATE, MONOBASIC POTASSIUM PHOSPHATE, DIBASIC 83.33 MILLIMOLE(S): 236; 224 INJECTION, SOLUTION INTRAVENOUS at 05:28

## 2021-11-29 RX ADMIN — ENOXAPARIN SODIUM 40 MILLIGRAM(S): 100 INJECTION SUBCUTANEOUS at 11:12

## 2021-11-29 RX ADMIN — Medication 650 MILLIGRAM(S): at 18:57

## 2021-11-29 RX ADMIN — PANTOPRAZOLE SODIUM 40 MILLIGRAM(S): 20 TABLET, DELAYED RELEASE ORAL at 07:25

## 2021-11-29 RX ADMIN — SODIUM CHLORIDE 100 MILLILITER(S): 9 INJECTION, SOLUTION INTRAVENOUS at 11:14

## 2021-11-29 RX ADMIN — Medication 90 MILLIGRAM(S): at 05:28

## 2021-11-29 RX ADMIN — CHLORHEXIDINE GLUCONATE 1 APPLICATION(S): 213 SOLUTION TOPICAL at 05:29

## 2021-11-29 NOTE — PRE-ANESTHESIA EVALUATION ADULT - NSANTHOSAYNRD_GEN_A_CORE
No. DON screening performed.  STOP BANG Legend: 0-2 = LOW Risk; 3-4 = INTERMEDIATE Risk; 5-8 = HIGH Risk
No. DON screening performed.  STOP BANG Legend: 0-2 = LOW Risk; 3-4 = INTERMEDIATE Risk; 5-8 = HIGH Risk

## 2021-11-29 NOTE — PROVIDER CONTACT NOTE (OTHER) - ACTION/TREATMENT ORDERED:
Will order appropriate medication
give BP medication as ordered.
recheck mannually when patient is resting
no intervention ordered. Both doctors went to assess pt at this time.
MD made aware, will order appropriate medication
Morning medication PO Nifedipine given as per order, MD made aware, will recheck BP later
No intervention, continue to monitor.

## 2021-11-29 NOTE — PROVIDER CONTACT NOTE (OTHER) - NAME OF MD/NP/PA/DO NOTIFIED:
Dr Melendrez
ERWIN Yap
MD Melendrez
MD Melendrez [phone]
ERWIN Yap
ERWIN Yap
Dr Craft and Dr Caballero
MD Melendrez [phone]
MD Corrales [phone]

## 2021-11-29 NOTE — PROVIDER CONTACT NOTE (OTHER) - DATE AND TIME:
26-Nov-2021 09:00
26-Nov-2021 10:44
26-Nov-2021 20:00
24-Nov-2021 23:52
26-Nov-2021 15:30
24-Nov-2021 18:19
25-Nov-2021 05:40
29-Nov-2021 05:00
28-Nov-2021 17:30

## 2021-11-29 NOTE — PROVIDER CONTACT NOTE (OTHER) - REASON
BP
Patient found to have nifedipine pill in colostomy bag that was not digested. Patient may need to be switched to a different BP meds
stoma appearance
/101 when patient arrived from the ED
patient BP elevated
repeat /77 heart rate 107
/85 heart rate 106 on ambulation

## 2021-11-29 NOTE — PROGRESS NOTE ADULT - ATTENDING COMMENTS
71F with no known medical history who presents with abdominal pain distention constipation.  Found to have large bowel obstruction due to sigmoid mass and breast mass on CTAP.    POD5 s/p laparoscopic creation of transverse loop colostomy    Patient seen and examined.  Patient reports incisional pain.    Abdomen - soft, non distended, obese, appropriately tender.  RUQ stoma with stoma bar in place. Viable afferent and efferent opening with necrosis of bridging colon. Productive of gas and stool.    Vitals labs and images reviewed    CTAP - Large bowel obstruction secondary to a likely malignant stricture in the proximal sigmoid colon. Please correlate with correlate endoscopy.  Small volume ascites.  Lobulated, partially visualized right breast mass and left breast nodule. Physical exam correlation and correlation with dedicated breast imaging is recommended as malignancy is of concern.  CEA 2.1    CT chest - 1. Compared with the previous scan of the abdomen pelvis dated 11/24/2021, the previously noted right and left breast masses are again identified. The right breast mass measures 6.5 x 3.8 x 7.0 cm and contains small calcifications. The right breast mass measures 3.1 x 2.9 x 2.6 cm.    2. There are no suspicious lung nodules.    Plan  - LFD  - IVF  - appreciate breast surgery input  - GI for flexible sigmoidoscopy and biopsy  - WOCN teaching  - DC stoma bar

## 2021-11-29 NOTE — CHART NOTE - NSCHARTNOTEFT_GEN_A_CORE
PACU ANESTHESIA ADMISSION NOTE      Procedure:   Post op diagnosis:      ____  Intubated  TV:______       Rate: ______      FiO2: ______    __x__  Patent Airway    __x__  Full return of protective reflexes    __x__  Full recovery from anesthesia / back to baseline status    Vitals  HR: 94  BP: 122/57  RR: 16  O2 Sat: 98%  Temp: 97.9    Mental Status:  __x__ Awake   ___x__ Alert   _____ Drowsy   _____ Sedated    Nausea/Vomiting:  __x__ NO  ______Yes,   See Post - Op Orders          Pain Scale (0-10):  _____    Treatment: ____ None    __x__ See Post - Op/PCA Orders    Post - Operative Fluids:   ____ Oral   __x__ See Post - Op Orders    Plan: Discharge when criteria met:   ____Home       __x___Floor     _____Critical Care   Other:_________________    Comments: Patient had smooth intraoperative event, no anesthesia complication.
PACU ANESTHESIA ADMISSION NOTE      Procedure: Colostomy, loop, transverse      Post op diagnosis:  Large bowel obstruction        ____  Intubated  TV:______       Rate: ______      FiO2: ______    __x__  Patent Airway    __x__  Full return of protective reflexes    __x__  Full recovery from anesthesia / back to baseline     Vitals:   T:   97.5        R: 18                  BP: 118/70                  Sat:  98                 P: 94      Mental Status:  __x__ Awake   ___x__ Alert   _____ Drowsy   _____ Sedated    Nausea/Vomiting:  _x___ NO  ______Yes,   See Post - Op Orders          Pain Scale (0-10):  _____    Treatment: __x__ None    ____ See Post - Op/PCA Orders    Post - Operative Fluids:   ____ Oral   ___x_ See Post - Op Orders    Plan: Discharge:   __x__Home       _____Floor     _____Critical Care    _____  Other:_________________    Comments:    Uneventful anesthesia. Patient transported to  spontaneously breathing and hemodynamically stable.
Flexible sigmoidoscopy findings:   Circumferential malignant looking mass occupying 90% of lumen is noted in sigmoid colon at 30 cm from the rectum. Couldn't visualize proximal to the mass. -Poor prep limited the visualization of rectum and descending colon. .      -Bulgarian ink tattooing was performed 5 cm proximal to the lesion at three different locations. .       Plan: Resume previous diet  Await pathology results  Rest of the care as per surgery  ?Resume previous diet  Await pathology results  Rest of the care as per surgery

## 2021-11-29 NOTE — PROVIDER CONTACT NOTE (OTHER) - SITUATION
/89   HR 82  Patient denies pain
/80  HR 95
Pt hypertensive
pt BP elevated this morning. patient due for BP medication at this time
upon assessment of stoma, stoma appears dusky in color. ostomy is functioning. Dr. Craft and Dr Caballero were making rounds and were made aware.

## 2021-11-29 NOTE — PROGRESS NOTE ADULT - ASSESSMENT
ASSESSMENT:  71F who presented with LBO 2/2 sigmoid mass who is s/p laparoscopic transverse loop colostomy. Pt evaluated at bedside AAOX3, NAD +ostomy output, +voiding.     PLAN:  -Preoped for possible Flex/Sig w/ GI today.  -Monitor BP   -GI/DVT ppx  -Pain control  -IS  -Ambulation as tolerated   -F/u breast bx  -F/u GI consult for flexible sigmoidoscopy and biopsy  -Monitor stoma, may need revision if it worsens. ostomy getting darker but not painful and still functioning, will continue to monitor  -DC stoma bar 11/29  -CT chest negative for lung mets   -CEA level 2.1 negative      Raymond Melendrez MD  General Surgery PGY-1  BLUE TEAM SPECTRA [phone]

## 2021-11-30 ENCOUNTER — TRANSCRIPTION ENCOUNTER (OUTPATIENT)
Age: 71
End: 2021-11-30

## 2021-11-30 VITALS — OXYGEN SATURATION: 98 % | TEMPERATURE: 97 F | RESPIRATION RATE: 18 BRPM | HEART RATE: 104 BPM

## 2021-11-30 LAB
SURGICAL PATHOLOGY STUDY: SIGNIFICANT CHANGE UP
SURGICAL PATHOLOGY STUDY: SIGNIFICANT CHANGE UP

## 2021-11-30 PROCEDURE — 99239 HOSP IP/OBS DSCHRG MGMT >30: CPT

## 2021-11-30 RX ORDER — ENOXAPARIN SODIUM 100 MG/ML
40 INJECTION SUBCUTANEOUS
Qty: 1120 | Refills: 0
Start: 2021-11-30 | End: 2021-12-27

## 2021-11-30 RX ORDER — AMLODIPINE BESYLATE 2.5 MG/1
5 TABLET ORAL ONCE
Refills: 0 | Status: COMPLETED | OUTPATIENT
Start: 2021-11-30 | End: 2021-11-30

## 2021-11-30 RX ORDER — HYDRALAZINE HCL 50 MG
10 TABLET ORAL ONCE
Refills: 0 | Status: COMPLETED | OUTPATIENT
Start: 2021-11-30 | End: 2021-11-30

## 2021-11-30 RX ORDER — AMLODIPINE BESYLATE 2.5 MG/1
1 TABLET ORAL
Qty: 30 | Refills: 0
Start: 2021-11-30 | End: 2021-12-29

## 2021-11-30 RX ORDER — CHLORTHALIDONE 50 MG
0.5 TABLET ORAL
Qty: 15 | Refills: 0
Start: 2021-11-30 | End: 2021-12-29

## 2021-11-30 RX ORDER — ACETAMINOPHEN 500 MG
2 TABLET ORAL
Qty: 0 | Refills: 0 | DISCHARGE
Start: 2021-11-30

## 2021-11-30 RX ADMIN — Medication 90 MILLIGRAM(S): at 05:04

## 2021-11-30 RX ADMIN — Medication 10 MILLIGRAM(S): at 01:36

## 2021-11-30 RX ADMIN — PANTOPRAZOLE SODIUM 40 MILLIGRAM(S): 20 TABLET, DELAYED RELEASE ORAL at 05:04

## 2021-11-30 RX ADMIN — Medication 650 MILLIGRAM(S): at 05:04

## 2021-11-30 RX ADMIN — AMLODIPINE BESYLATE 5 MILLIGRAM(S): 2.5 TABLET ORAL at 13:02

## 2021-11-30 RX ADMIN — Medication 650 MILLIGRAM(S): at 00:00

## 2021-11-30 NOTE — CONSULT NOTE ADULT - SUBJECTIVE AND OBJECTIVE BOX
SUBJECTIVE:    Patient is a 71y old Female who presents with a chief complaint of Abd pain (29 Nov 2021 02:17)    Currently admitted to medicine with the primary diagnosis of Large bowel obstruction     Today is hospital day 6d. This morning she is resting in bed and reports no new issues or overnight events.     PAST MEDICAL & SURGICAL HISTORY    SOCIAL HISTORY:  Negative for smoking/alcohol/drug use.     ALLERGIES:  No Known Allergies    MEDICATIONS:  STANDING MEDICATIONS  acetaminophen     Tablet .. 650 milliGRAM(s) Oral every 6 hours  chlorhexidine 4% Liquid 1 Application(s) Topical <User Schedule>  enoxaparin Injectable 40 milliGRAM(s) SubCutaneous daily  influenza  Vaccine (HIGH DOSE) 0.7 milliLiter(s) IntraMuscular once  NIFEdipine XL 90 milliGRAM(s) Oral daily  pantoprazole    Tablet 40 milliGRAM(s) Oral before breakfast    PRN MEDICATIONS  HYDROmorphone  Injectable 0.5 milliGRAM(s) IV Push every 6 hours PRN    VITALS:   T(F): 98  HR: 100  BP: 160/74  RR: 18  SpO2: 95%    LABS:                        13.6   8.73  )-----------( 331      ( 29 Nov 2021 21:07 )             41.7     11-29    139  |  104  |  11  ----------------------------<  120<H>  4.0   |  18  |  0.7    Ca    8.2<L>      29 Nov 2021 21:07  Phos  2.8     11-29  Mg     2.0     11-29      PT/INR - ( 28 Nov 2021 20:00 )   PT: 13.40 sec;   INR: 1.17 ratio      PTT - ( 28 Nov 2021 20:00 )  PTT:29.4 sec      RADIOLOGY:  CT Chest w/ IV Cont (11.26.21)  1. Compared with the previous scan of the abdomen pelvis dated 11/24/2021, the previously noted right and left breast masses are again identified. The right breast mass measures 6.5 x 3.8 x 7.0 cm and contains small calcifications. The right breast mass measures 3.1 x 2.9 x 2.6 cm.  2. There are no suspicious lung nodules.        PHYSICAL EXAM:  GEN: No acute distress  LUNGS: Clear to auscultation bilaterally   HEART: S1/S2 present. RRR.   ABD: Soft, non-tender, non-distended. Bowel sounds present  EXT: NC/NC/NE/2+PP/EDUARDO  NEURO: [id number]

## 2021-11-30 NOTE — PROGRESS NOTE ADULT - PROVIDER SPECIALTY LIST ADULT
Colorectal Surgery
Plastic Surgery
Colorectal Surgery
Colorectal Surgery
Plastic Surgery
Colorectal Surgery

## 2021-11-30 NOTE — PROGRESS NOTE ADULT - SUBJECTIVE AND OBJECTIVE BOX
COLORECTAL SURGERY PROGRESS NOTE    Events of past 24 hours:   Patient seen bedside on 4C for post-operative check s/p transverse loop colostomy.  Patient reports feeling much improved, no nausea/emesis, pain well controlled.  Ostomy pink/patent and bag with liquid stool and gas.    Vitals:   T(F): 96.4 (21 @ 23:51), Max: 98.3 (21 @ 19:21)  HR: 82 (21 @ 00:48) (81 - 114)  BP: 152/76 (21 @ 00:48) (109/55 - 230/110)  RR: 18 (21 @ 23:51)  SpO2: 95% (21 @ 23:51) (95% - 100%)      Diet, NPO:   Except Medications     Special Instructions for Nursing:  Except Medications      Fluids: lactated ringers.: Solution, 1000 milliLiter(s) infuse at 150 mL/Hr      I & O's:          PHYSICAL EXAM:  General Appearance: NAD  Heart: RR  Lungs: Unlabored breathing at rest  Abdomen:  Soft, nontender, nondistended. No guarding or rebound.  Ostomy bag with liquid stool and gas.  MSK/Extremities: Warm & well-perfused.   Skin: Warm, dry. No jaundice.       MEDICATIONS  (STANDING):  acetaminophen     Tablet .. 650 milliGRAM(s) Oral every 6 hours  cefoTEtan  IVPB 2 Gram(s) IV Intermittent every 12 hours  chlorhexidine 4% Liquid 1 Application(s) Topical <User Schedule>  enoxaparin Injectable 40 milliGRAM(s) SubCutaneous daily  influenza  Vaccine (HIGH DOSE) 0.7 milliLiter(s) IntraMuscular once  lactated ringers. 1000 milliLiter(s) (150 mL/Hr) IV Continuous <Continuous>  NIFEdipine XL 60 milliGRAM(s) Oral daily  pantoprazole    Tablet 40 milliGRAM(s) Oral before breakfast    MEDICATIONS  (PRN):  HYDROmorphone  Injectable 0.5 milliGRAM(s) IV Push every 6 hours PRN Severe Pain (7 - 10)  ketorolac   Injectable 15 milliGRAM(s) IV Push every 8 hours PRN Moderate Pain (4 - 6)        LAB/STUDIES:                          16.7   12.20 )-----------( 308      ( 2021 13:25 )             47.6           129<L>  |  92<L>  |  19  ----------------------------<  155<H>  4.0   |  21  |  0.9    Ca    9.2      2021 11:30    TPro  6.8  /  Alb  4.5  /  TBili  1.4<H>  /  DBili  x   /  AST  23  /  ALT  22  /  AlkPhos  96                Urinalysis Basic - ( 2021 11:30 )    Color: Radha / Appearance: Slightly Turbid / S.036 / pH: x  Gluc: x / Ketone: Negative  / Bili: Small / Urobili: 6 mg/dL   Blood: x / Protein: 100 mg/dL / Nitrite: Negative   Leuk Esterase: Negative / RBC: 3 /HPF / WBC 47 /HPF   Sq Epi: x / Non Sq Epi: 3 /HPF / Bacteria: Few        PT/INR - ( 2021 15:55 )   PT: 13.10 sec;   INR: 1.14 ratio         PTT - ( 2021 15:55 )  PTT:30.5 sec    Lactate Trend   @ 11:30 Lactate:1.6             CAPILLARY BLOOD GLUCOSE                IMAGING:    
COLORECTAL SURGERY PROGRESS NOTE    Events of past 24 hours:   MYA.  Ostomy functioning with stool and gas in bag, changed today.  Nifedipine increased to 90 QD.  Medicine consult called in AM yesterday 2/2 uncontrolled HTN overnight on 11/27.      Vitals:   T(F): 98.1 (11-28-21 @ 20:43), Max: 98.3 (11-28-21 @ 05:07)  HR: 96 (11-28-21 @ 20:43) (82 - 101)  BP: 168/88 (11-28-21 @ 16:59) (140/82 - 189/86)  RR: 18 (11-28-21 @ 20:43)  SpO2: 98% (11-28-21 @ 20:43) (96% - 98%)      Diet, NPO after Midnight:      NPO Start Date: 28-Nov-2021,   NPO Start Time: 23:59  Diet, Low Fiber      Fluids:     I & O's:    11-27-21 @ 07:01  -  11-28-21 @ 07:00  --------------------------------------------------------  IN:  Total IN: 0 mL    OUT:    Colostomy (mL): 600 mL  Total OUT: 600 mL    Total NET: -600 mL        PHYSICAL EXAM:  General: NAD  Cardiac: RR  Respiratory: CTAB, normal respiratory effort  Abdomen: Soft, non-distended, non-tender, no rebound, no guarding. Colostomy in place; ostomy dusky, dark red with layer of mucosa sloughing off; ostomy bar in place   Skin: Warm/dry, normal color, no jaundice      MEDICATIONS  (STANDING):  acetaminophen     Tablet .. 650 milliGRAM(s) Oral every 6 hours  chlorhexidine 4% Liquid 1 Application(s) Topical <User Schedule>  enoxaparin Injectable 40 milliGRAM(s) SubCutaneous daily  influenza  Vaccine (HIGH DOSE) 0.7 milliLiter(s) IntraMuscular once  NIFEdipine XL 90 milliGRAM(s) Oral daily  pantoprazole    Tablet 40 milliGRAM(s) Oral before breakfast  potassium phosphate IVPB 30 milliMole(s) IV Intermittent once    MEDICATIONS  (PRN):  HYDROmorphone  Injectable 0.5 milliGRAM(s) IV Push every 6 hours PRN Severe Pain (7 - 10)        LAB/STUDIES:                          12.8   8.06  )-----------( 266      ( 28 Nov 2021 20:00 )             39.0       11-28    137  |  102  |  9<L>  ----------------------------<  98  3.7   |  20  |  0.5<L>    Ca    8.2<L>      28 Nov 2021 20:00  Phos  2.5     11-28  Mg     2.2     11-28                    PT/INR - ( 28 Nov 2021 20:00 )   PT: 13.40 sec;   INR: 1.17 ratio         PTT - ( 28 Nov 2021 20:00 )  PTT:29.4 sec    Lactate Trend  11-24 @ 11:30 Lactate:1.6           
GENERAL SURGERY PROGRESS NOTE    Patient: ABHINAV BOYKIN , 71y ([birth year])Female   MRN: [id number]  Location: [address]  Visit: 11-24-21 Inpatient  Date: 11-27-21 @ 01:47    Hospital Day #: 4  Post-Op Day #: 2     Procedure/Dx/Injuries: right breast mass, s/p core needle biopsy     Events of past 24 hours: patient had the bedside core needle biopsy, doing fine post procedure. pain well controlled    PAST MEDICAL & SURGICAL HISTORY:      Vitals:   T(F): 97.4 (11-27-21 @ 01:10), Max: 99.2 (11-26-21 @ 21:58)  HR: 99 (11-27-21 @ 01:10)  BP: 154/65 (11-27-21 @ 01:10)  RR: 20 (11-27-21 @ 01:10)  SpO2: 98% (11-27-21 @ 01:10)      Diet, Low Fiber      Fluids:     I & O's:    11-25-21 @ 07:01  -  11-26-21 @ 07:00  --------------------------------------------------------  IN:    IV PiggyBack: 100 mL    Lactated Ringers: 1800 mL    Oral Fluid: 990 mL  Total IN: 2890 mL    OUT:    Colostomy (mL): 50 mL    Indwelling Catheter - Urethral (mL): 500 mL    Voided (mL): 1600 mL  Total OUT: 2150 mL    Total NET: 740 mL        Bowel Movement: : [] YES [] NO  Flatus: : [] YES [] NO    PHYSICAL EXAM:  General: NAD, AAOx3,   Cardiac: RRR S1, S2,  Respiratory: CTAB, normal respiratory effort  Abdomen: Soft, non-distended, non-tender,   right breast Incision/wound: dressings in place, clean, dry and intact    MEDICATIONS  (STANDING):  acetaminophen     Tablet .. 650 milliGRAM(s) Oral every 6 hours  chlorhexidine 4% Liquid 1 Application(s) Topical <User Schedule>  enoxaparin Injectable 40 milliGRAM(s) SubCutaneous daily  influenza  Vaccine (HIGH DOSE) 0.7 milliLiter(s) IntraMuscular once  NIFEdipine XL 60 milliGRAM(s) Oral daily  pantoprazole    Tablet 40 milliGRAM(s) Oral before breakfast  potassium phosphate IVPB 30 milliMole(s) IV Intermittent once    MEDICATIONS  (PRN):  HYDROmorphone  Injectable 0.5 milliGRAM(s) IV Push every 6 hours PRN Severe Pain (7 - 10)  ketorolac   Injectable 15 milliGRAM(s) IV Push every 8 hours PRN Moderate Pain (4 - 6)      DVT PROPHYLAXIS: enoxaparin Injectable 40 milliGRAM(s) SubCutaneous daily    GI PROPHYLAXIS: pantoprazole    Tablet 40 milliGRAM(s) Oral before breakfast    ANTICOAGULATION:   ANTIBIOTICS:        LAB/STUDIES:  Labs:  CAPILLARY BLOOD GLUCOSE                              13.5   9.01  )-----------( 227      ( 26 Nov 2021 20:00 )             40.6       Auto Neutrophil %: 82.4 % (11-26-21 @ 20:00)  Auto Immature Granulocyte %: 0.4 % (11-26-21 @ 20:00)    11-26    141  |  101  |  13  ----------------------------<  130<H>  3.7   |  23  |  0.8      Calcium, Total Serum: 8.1 mg/dL (11-26-21 @ 20:00)      LFTs:     Lactate, Blood: 1.6 mmol/L (11-24-21 @ 11:30)      Coags:    IMAGING:      ACCESS/ DEVICES:  [x ] Peripheral IV  [ ] Central Venous Line	[ ] R	[ ] L	[ ] IJ	[ ] Fem	[ ] SC	Placed:   [ ] Arterial Line		[ ] R	[ ] L	[ ] Fem	[ ] Rad	[ ] Ax	Placed:   [ ] PICC:					[ ] Mediport  [ ] Urinary Catheter,  Date Placed:   [ ] Chest tube: [ ] Right, [ ] Left  [ ] ALONZO/Cruzito Drains      
· Subjective and Objective:   GENERAL SURGERY PROGRESS NOTE    Patient: ABHINAV BOYKIN , 71y ([birth year])Female   MRN: [id number]  Location: [address]  Visit: 11-24-21 Inpatient    Hospital Day #: 4  Post-Op Day #: 2     Procedure/Dx/Injuries: right breast mass, s/p core needle biopsy     Events of past 24 hours: patient had the bedside core needle biopsy, doing fine post procedure. pain well controlled. ostomy getting darker but not painful and still functioning      Vitals:   T(F): 97.4 (11-27-21 @ 01:10), Max: 99.2 (11-26-21 @ 21:58)  HR: 99 (11-27-21 @ 01:10)  BP: 154/65 (11-27-21 @ 01:10)  RR: 20 (11-27-21 @ 01:10)  SpO2: 98% (11-27-21 @ 01:10)      Diet, Low Fiber      Fluids:     I & O's:    11-25-21 @ 07:01  -  11-26-21 @ 07:00  --------------------------------------------------------  IN:    IV PiggyBack: 100 mL    Lactated Ringers: 1800 mL    Oral Fluid: 990 mL  Total IN: 2890 mL    OUT:    Colostomy (mL): 50 mL    Indwelling Catheter - Urethral (mL): 500 mL    Voided (mL): 1600 mL  Total OUT: 2150 mL    Total NET: 740 mL      PHYSICAL EXAM:  General: NAD, AAOx3,   Cardiac: RRR S1, S2,  Respiratory: CTAB, normal respiratory effort  Abdomen: Soft, non-distended, non-tender, ostomy dusky, dark red with layer of mucosa that appears to be sloughing off, ostomy bar in place. minimal drainage in ostomy bag  right breast Incision/wound: dressings in place, clean, dry and intact    MEDICATIONS  (STANDING):  acetaminophen     Tablet .. 650 milliGRAM(s) Oral every 6 hours  chlorhexidine 4% Liquid 1 Application(s) Topical <User Schedule>  enoxaparin Injectable 40 milliGRAM(s) SubCutaneous daily  influenza  Vaccine (HIGH DOSE) 0.7 milliLiter(s) IntraMuscular once  NIFEdipine XL 60 milliGRAM(s) Oral daily  pantoprazole    Tablet 40 milliGRAM(s) Oral before breakfast  potassium phosphate IVPB 30 milliMole(s) IV Intermittent once    MEDICATIONS  (PRN):  HYDROmorphone  Injectable 0.5 milliGRAM(s) IV Push every 6 hours PRN Severe Pain (7 - 10)  ketorolac   Injectable 15 milliGRAM(s) IV Push every 8 hours PRN Moderate Pain (4 - 6)      DVT PROPHYLAXIS: enoxaparin Injectable 40 milliGRAM(s) SubCutaneous daily    GI PROPHYLAXIS: pantoprazole    Tablet 40 milliGRAM(s) Oral before breakfast      LAB/STUDIES:                     13.5   9.01  )-----------( 227      ( 26 Nov 2021 20:00 )             40.6       Auto Neutrophil %: 82.4 % (11-26-21 @ 20:00)  Auto Immature Granulocyte %: 0.4 % (11-26-21 @ 20:00)    11-26    141  |  101  |  13  ----------------------------<  130<H>  3.7   |  23  |  0.8      Calcium, Total Serum: 8.1 mg/dL (11-26-21 @ 20:00)     Lactate, Blood: 1.6 mmol/L (11-24-21 @ 11:30)      IMAGING:  < from: CT Chest w/ IV Cont (11.26.21 @ 17:35) >  IMPRESSION:  1. Compared with the previous scan of the abdomen pelvis dated 11/24/2021, the previously noted right and left breast masses are again identified. The right breast mass measures 6.5 x 3.8 x 7.0 cm and contains small calcifications. The right breast mass measures 3.1 x 2.9 x 2.6 cm.  2. There are no suspicious lung nodules.  < end of copied text >      ACCESS/ DEVICES:  [x ] Peripheral IV
GENERAL SURGERY PROGRESS NOTE    Patient: ABHINAV BOYKIN , 71y ([birth year])Female   MRN: [id number]  Location: [address]  Visit: 21 Inpatient  Date: 21 @ 03:57      Events of past 24 hours: breast sx evaluation, HTN, 2.5 of vasotec     PAST MEDICAL & SURGICAL HISTORY:      Vitals:   T(F): 99.1 (21 @ 00:50), Max: 99.1 (21 @ 00:50)  HR: 99 (21 @ 00:50)  BP: 163/75 (21 @ 00:50)  RR: 18 (21 @ 00:50)  SpO2: 100% (21 @ 00:50)      Diet, Clear Liquid      Fluids:     I & O's:    21 @ 07:01  -  21 @ 07:00  --------------------------------------------------------  IN:    IV PiggyBack: 50 mL    Lactated Ringers: 125 mL    Lactated Ringers: 1200 mL  Total IN: 1375 mL    OUT:    Colostomy (mL): 50 mL    Indwelling Catheter - Urethral (mL): 750 mL  Total OUT: 800 mL    Total NET: 575 mL          PHYSICAL EXAM:  General Appearance: AAOX3, NAD  Heart: RRR  Lungs: CTAx2  Abdomen:  Soft, nontender, nondistended. No guarding or rebound.  Ostomy bag +stool  MSK/Extremities:  mobile extremities, no edema      MEDICATIONS  (STANDING):  acetaminophen     Tablet .. 650 milliGRAM(s) Oral every 6 hours  chlorhexidine 4% Liquid 1 Application(s) Topical <User Schedule>  enoxaparin Injectable 40 milliGRAM(s) SubCutaneous daily  influenza  Vaccine (HIGH DOSE) 0.7 milliLiter(s) IntraMuscular once  lactated ringers. 1000 milliLiter(s) (150 mL/Hr) IV Continuous <Continuous>  NIFEdipine XL 60 milliGRAM(s) Oral daily  pantoprazole    Tablet 40 milliGRAM(s) Oral before breakfast    MEDICATIONS  (PRN):  HYDROmorphone  Injectable 0.5 milliGRAM(s) IV Push every 6 hours PRN Severe Pain (7 - 10)  ketorolac   Injectable 15 milliGRAM(s) IV Push every 8 hours PRN Moderate Pain (4 - 6)      DVT PROPHYLAXIS: enoxaparin Injectable 40 milliGRAM(s) SubCutaneous daily    GI PROPHYLAXIS: pantoprazole    Tablet 40 milliGRAM(s) Oral before breakfast    ANTICOAGULATION:   ANTIBIOTICS:            LAB/STUDIES:  Labs:  CAPILLARY BLOOD GLUCOSE                              14.4   8.88  )-----------( 238      ( 2021 21:51 )             43.2       Auto Neutrophil %: 84.4 % (21 @ 21:51)  Auto Immature Granulocyte %: 0.3 % (21 @ 21:51)  Auto Neutrophil %: 85.0 % (21 @ 04:30)  Auto Immature Granulocyte %: 0.5 % (21 @ 04:30)        143  |  104  |  9<L>  ----------------------------<  110<H>  3.3<L>   |  22  |  0.7      Calcium, Total Serum: 8.0 mg/dL (21 @ 21:51)      LFTs:             6.8  | 1.4  | 23       ------------------[96      ( 2021 11:30 )  4.5  | x    | 22          Lipase:60     Amylase:x         Lactate, Blood: 1.6 mmol/L (21 @ 11:30)      Coags:     13.10  ----< 1.14    ( 2021 15:55 )     30.5                Urinalysis Basic - ( 2021 11:30 )    Color: Radha / Appearance: Slightly Turbid / S.036 / pH: x  Gluc: x / Ketone: Negative  / Bili: Small / Urobili: 6 mg/dL   Blood: x / Protein: 100 mg/dL / Nitrite: Negative   Leuk Esterase: Negative / RBC: 3 /HPF / WBC 47 /HPF   Sq Epi: x / Non Sq Epi: 3 /HPF / Bacteria: Few                IMAGING:      ACCESS/ DEVICES:  [x ] Peripheral IV  [ ] Central Venous Line	[ ] R	[ ] L	[ ] IJ	[ ] Fem	[ ] SC	Placed:   [ ] Arterial Line		[ ] R	[ ] L	[ ] Fem	[ ] Rad	[ ] Ax	Placed:   [ ] PICC:					[ ] Mediport  [ ] Urinary Catheter,  Date Placed:   [ ] Chest tube: [ ] Right, [ ] Left  [ ] ALONZO/Cruzito Drains          
GENERAL SURGERY PROGRESS NOTE    Patient: ABHINAV BOYKIN , 71y ([birth year])Female   MRN: [id number]  Location: [address]  Visit: 11-24-21 Inpatient  Date: 11-30-21 @ 10:15    Hospital Day #: 7  Post-Op Day #:6    Procedure/Dx/Injuries: s/p transverse loop colostomy     Events of past 24 hours: Pt seen and examined at bedside. Pt went for a flex sig and bx of sigmoid mass yesterday with GI; tolerated the procedure well. Pt continues to be hypertensive; medicine consulted and gave recommendations.     PAST MEDICAL & SURGICAL HISTORY:    Vitals:   T(F): 98 (11-30-21 @ 08:40), Max: 98.7 (11-29-21 @ 12:42)  HR: 100 (11-30-21 @ 09:27)  BP: 160/74 (11-30-21 @ 09:27)  RR: 18 (11-30-21 @ 08:40)  SpO2: 95% (11-30-21 @ 08:40)    Diet, Low Fiber    11-29-21 @ 07:01  -  11-30-21 @ 07:00  --------------------------------------------------------  IN:    dextrose 5% + sodium chloride 0.45% w/ Additives: 200 mL    IV PiggyBack: 249 mL  Total IN: 449 mL  OUT:    Colostomy (mL): 25 mL    Voided (mL): 600 mL  Total OUT: 625 mL  Total NET: -176 mL    PHYSICAL EXAM:  General: NAD  HEENT: NCAT, EOMI  Cardiac: RRR S1, S2  Respiratory: CTAB, normal respiratory effort  Abdomen: Soft, non-distended, non-tender, no rebound, no guarding. Colostomy in place; ostomy dusky, dark red with layer of mucosa sloughing off  Skin: Warm/dry, normal color, no jaundice    MEDICATIONS  (STANDING):  acetaminophen     Tablet .. 650 milliGRAM(s) Oral every 6 hours  chlorhexidine 4% Liquid 1 Application(s) Topical <User Schedule>  enoxaparin Injectable 40 milliGRAM(s) SubCutaneous daily  influenza  Vaccine (HIGH DOSE) 0.7 milliLiter(s) IntraMuscular once  NIFEdipine XL 90 milliGRAM(s) Oral daily  pantoprazole    Tablet 40 milliGRAM(s) Oral before breakfast    MEDICATIONS  (PRN):  HYDROmorphone  Injectable 0.5 milliGRAM(s) IV Push every 6 hours PRN Severe Pain (7 - 10)    DVT PROPHYLAXIS: enoxaparin Injectable 40 milliGRAM(s) SubCutaneous daily  GI PROPHYLAXIS: pantoprazole    Tablet 40 milliGRAM(s) Oral before breakfast    LAB/STUDIES:  Labs:                    13.6   8.73  )-----------( 331      ( 29 Nov 2021 21:07 )             41.7       Auto Neutrophil %: 79.4 % (11-29-21 @ 21:07)  Auto Immature Granulocyte %: 0.6 % (11-29-21 @ 21:07)    11-29    139  |  104  |  11  ----------------------------<  120<H>  4.0   |  18  |  0.7    Calcium, Total Serum: 8.2 mg/dL (11-29-21 @ 21:07)    Coags:     13.40  ----< 1.17    ( 28 Nov 2021 20:00 )     29.4      IMAGING:  No new imaging past 24hrs
GENERAL SURGERY PROGRESS NOTE    Patient: ABHINAV BOYKIN , 71y ([birth year])Female   MRN: [id number]  Location: [address]  Visit: 11-24-21 Inpatient  Date: 11-28-21 @ 01:45    Hospital Day #:5  Post-Op Day #:3    Procedure/Dx/Injuries: S/P transverse loop colostomy     Events of past 24 hours: Pt seen and examined at bedside. Ostomy functioning well with gas and stool output. Overnight Pt was hypertensive and was given 10mg hydralazine x2. Pt denied any CP, palpitations, HA, SOB.     PAST MEDICAL & SURGICAL HISTORY:    Vitals:   T(F): 98.9 (11-28-21 @ 00:29), Max: 99.1 (11-27-21 @ 05:06)  HR: 96 (11-28-21 @ 00:29)  BP: 180/75 (11-28-21 @ 00:29)  RR: 18 (11-28-21 @ 00:29)  SpO2: 98% (11-28-21 @ 00:29)    Diet, Low Fiber    11-26-21 @ 07:01  -  11-27-21 @ 07:00  --------------------------------------------------------  IN:    Lactated Ringers: 600 mL  Total IN: 600 mL  OUT:    Colostomy (mL): 270 mL  Total OUT: 270 mL  Total NET: 330 mL    PHYSICAL EXAM:  General: NAD  HEENT: NCAT, EOMI  Cardiac: S1, S2  Respiratory: CTAB, normal respiratory effort  Abdomen: Soft, non-distended, non-tender, no rebound, no guarding. Colostomy in place; ostomy dusky, dark red with layer of mucosa sloughing off; ostomy bar in place   Skin: Warm/dry, normal color, no jaundice    MEDICATIONS  (STANDING):  acetaminophen     Tablet .. 650 milliGRAM(s) Oral every 6 hours  chlorhexidine 4% Liquid 1 Application(s) Topical <User Schedule>  enoxaparin Injectable 40 milliGRAM(s) SubCutaneous daily  influenza  Vaccine (HIGH DOSE) 0.7 milliLiter(s) IntraMuscular once  magnesium sulfate  IVPB 2 Gram(s) IV Intermittent once  NIFEdipine XL 90 milliGRAM(s) Oral daily  pantoprazole    Tablet 40 milliGRAM(s) Oral before breakfast  potassium chloride  20 mEq/100 mL IVPB 20 milliEquivalent(s) IV Intermittent every 2 hours    MEDICATIONS  (PRN):  HYDROmorphone  Injectable 0.5 milliGRAM(s) IV Push every 6 hours PRN Severe Pain (7 - 10)    DVT PROPHYLAXIS: enoxaparin Injectable 40 milliGRAM(s) SubCutaneous daily  GI PROPHYLAXIS: pantoprazole    Tablet 40 milliGRAM(s) Oral before breakfast    LAB/STUDIES:  Labs:                      13.0   8.72  )-----------( 227      ( 27 Nov 2021 22:57 )             38.7       Auto Neutrophil %: 80.3 % (11-27-21 @ 22:57)  Auto Immature Granulocyte %: 0.6 % (11-27-21 @ 22:57)    11-27    138  |  100  |  15  ----------------------------<  123<H>  3.3<L>   |  22  |  0.7    Calcium, Total Serum: 8.0 mg/dL (11-27-21 @ 22:57)    IMAGING:  No new imaging past 24hrs  
GENERAL SURGERY PROGRESS NOTE    Patient: ABHINAV BOYKIN , 71y ([birth year])Female   MRN: [id number]  Location: [address]  Visit: 21 Inpatient  Date: 21 @ 01:14    Hospital Day #:2  Post-Op Day #:1    Procedure/Dx/Injuries: s/p transverse loop colostomy    Events of past 24 hours:  Pt seen an examined at bedside. no acute events overnight during the day ppt was hypertensive and require x2  2.5 pushes of enalapril.    PAST MEDICAL & SURGICAL HISTORY:    Vitals:   T(F): 99.1 (21 @ 00:50), Max: 99.1 (21 @ 00:50)  HR: 99 (21 @ 00:50)  BP: 163/75 (21 @ 00:50)  RR: 18 (21 @ 00:50)  SpO2: 100% (21 @ 00:50)      Diet, Clear Liquid      Fluids:     I & O's:    21 @ 07:01  -  21 @ 07:00  --------------------------------------------------------  IN:    IV PiggyBack: 50 mL    Lactated Ringers: 125 mL    Lactated Ringers: 1200 mL  Total IN: 1375 mL    OUT:    Colostomy (mL): 50 mL    Indwelling Catheter - Urethral (mL): 750 mL  Total OUT: 800 mL    Total NET: 575 mL    PHYSICAL EXAM:  General: NAD, AAOx3, calm and cooperative  HEENT: NCAT, LEOBARDO, EOMI, Trachea ML, Neck supple  Cardiac: RRR S1, S2, no Murmurs, rubs or gallops  Respiratory: CTAB, normal respiratory effort, breath sounds equal BL, no wheeze, rhonchi or crackles    MEDICATIONS  (STANDING):  acetaminophen     Tablet .. 650 milliGRAM(s) Oral every 6 hours  chlorhexidine 4% Liquid 1 Application(s) Topical <User Schedule>  enoxaparin Injectable 40 milliGRAM(s) SubCutaneous daily  influenza  Vaccine (HIGH DOSE) 0.7 milliLiter(s) IntraMuscular once  lactated ringers. 1000 milliLiter(s) (150 mL/Hr) IV Continuous <Continuous>  NIFEdipine XL 60 milliGRAM(s) Oral daily  pantoprazole    Tablet 40 milliGRAM(s) Oral before breakfast  potassium chloride    Tablet ER 20 milliEquivalent(s) Oral once  potassium chloride  20 mEq/100 mL IVPB 20 milliEquivalent(s) IV Intermittent once    MEDICATIONS  (PRN):  HYDROmorphone  Injectable 0.5 milliGRAM(s) IV Push every 6 hours PRN Severe Pain (7 - 10)  ketorolac   Injectable 15 milliGRAM(s) IV Push every 8 hours PRN Moderate Pain (4 - 6)    DVT PROPHYLAXIS: enoxaparin Injectable 40 milliGRAM(s) SubCutaneous daily    GI PROPHYLAXIS: pantoprazole    Tablet 40 milliGRAM(s) Oral before breakfast    ANTICOAGULATION:   ANTIBIOTICS:      LAB/STUDIES:  Labs:  CAPILLARY BLOOD GLUCOSE                        14.4   8.88  )-----------( 238      ( 2021 21:51 )             43.2       Auto Neutrophil %: 84.4 % (21 @ 21:51)  Auto Immature Granulocyte %: 0.3 % (21 @ 21:51)  Auto Neutrophil %: 85.0 % (21 @ 04:30)  Auto Immature Granulocyte %: 0.5 % (21 @ 04:30)        143  |  104  |  9<L>  ----------------------------<  110<H>  3.3<L>   |  22  |  0.7    Calcium, Total Serum: 8.0 mg/dL (21 @ 21:51)    LFTs:             6.8  | 1.4  | 23       ------------------[96      ( 2021 11:30 )  4.5  | x    | 22          Lipase:60     Amylase:x         Lactate, Blood: 1.6 mmol/L (21 @ 11:30)    Coags:     13.10  ----< 1.14    ( 2021 15:55 )     30.5      Urinalysis Basic - ( 2021 11:30 )    Color: Radha / Appearance: Slightly Turbid / S.036 / pH: x  Gluc: x / Ketone: Negative  / Bili: Small / Urobili: 6 mg/dL   Blood: x / Protein: 100 mg/dL / Nitrite: Negative   Leuk Esterase: Negative / RBC: 3 /HPF / WBC 47 /HPF   Sq Epi: x / Non Sq Epi: 3 /HPF / Bacteria: Few      IMAGING:    ACCESS/ DEVICES:  [x] Peripheral IV  [ ] Central Venous Line	[ ] R	[ ] L	[ ] IJ	[ ] Fem	[ ] SC	Placed:   [ ] Arterial Line		[ ] R	[ ] L	[ ] Fem	[ ] Rad	[ ] Ax	Placed:   [ ] PICC:					[ ] Mediport  [ ] Urinary Catheter,  Date Placed:   [ ] Chest tube: [ ] Right, [ ] Left  [ ] ALONZO/Cruzito Drains

## 2021-11-30 NOTE — DISCHARGE NOTE PROVIDER - HOSPITAL COURSE
This is a 71 year old female with no reported PMH/PSH who presented to the hospital on 11/24 with intermittent abdominal pain and constipation. 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 Patient taken to the OR that day and is now s/p transverse loop colostomy. Patient tolerated the procedure well and was extubated post-operatively. An NGT remained in place.     11/25:  -Ostomy pink and patent with gas and liquid stool.  -NGT removed, kept NPO w/ IVFs.  -Breast surgery consult: possible core needle biopsy before discharge, f/u outpatient with Dr. Quinonez.    11/26:  -Advanced to CLD  -Stoma with superficial necrosis on afferent side.   -Ostomy teaching performed.  -Right breast core needle biopsy performed.  -PT: discharge home.    11/27-28:  -Advance to low fiber diet.  -Nifedipine increased due to continued HTN, requiring intermittent pushes of Hydralazine.    11/29:  -Stoma bar removed.   -GI flexible sigmoidoscopy: circumferential malignant appearing mass occupying 90% of lumen noted in sigmoid colon at 30cm from the rectum. Biopsies taken and  ink tattooing performed 5cm proximal to the mass.    11/30:  -Hospitalist: discharge on chlorthalidone 12.5mg QD and amlodipine 5mg QD, f/u outpatient for an echo.  -Ostomy teaching performed.  -Pain is well controlled, tolerating diet, ambulating independently, voiding without issue, and ostomy is functioning well.  -Patient stable for discharge home.

## 2021-11-30 NOTE — PROGRESS NOTE ADULT - ASSESSMENT
ASSESSMENT:  71F who presented with LBO 2/2 sigmoid mass who is s/p laparoscopic transverse loop colostomy. Pt evaluated at bedside AAOX3, NAD +ostomy output, +voiding.     PLAN:  -Monitor BP  -Medicine: discharge home with chlorthalidone 12.5mg daily and amlodipine 5mg daily   -Flex sig: Circumferential malignant looking mass occupying 90% of lumen is noted in sigmoid colon at 30 cm from the rectum. Couldn't visualize proximal to the mass. Slovenian ink tattooing was performed 5 cm proximal to the lesion at three different locations  -GI/DVT ppx  -Pain control  -IS  -Ambulation as tolerated   -F/u breast bx  -CT chest negative for lung mets   -CEA level 2.1 negative  -Discharge today    BLUE TEAM SPECTRA: [id number]

## 2021-11-30 NOTE — DISCHARGE NOTE PROVIDER - NSDCCPCAREPLAN_GEN_ALL_CORE_FT
PRINCIPAL DISCHARGE DIAGNOSIS  Diagnosis: Large bowel obstruction  Assessment and Plan of Treatment: s/p transverse loop colostomy.  -Diet: Continue low fiber diet as tolerated. Please avoid roughage like beef for now and instead stick to softer foods including chicken, fish, and pastas.  -Activity: Avoid heavy lifting or straining (anything over 10-15 pounds) for at least 6 weeks. You may ambulate and otherwise resume normal daily activities as tolerated. Your abdominal binder may help with some discomfort while ambulating. Please take home your incentive spirometer and continue to use 10x/hour while awake.  -Ostomy: Gently remove pouch water moistened gauze. Cleanse stoma site with water moistened gauze, gently pat dry. Measure stoma, currently 1 3/8" x 2 1/4". Trace and cut current size on Rico 2 ¾” CeraPlus flat barrier (#82881). Stretch Rico Adapt CeraRing (#0736) onto back of barrier. Apply barrier to patient's skin. Attach Ogema 2 ¾” drainable lock and roll pouch (#03072) onto barrier. Empty pouch when 1/3 to no more than 1/2 full of effluent/flatus. Change pouching system q 3 - 4 days and prn for leaking.  -Medications: You may resume your home medications. You may take Tylenol 650mg every 6 hours and alternate with Ibuprofen 600mg every 8 hours for pain. A prescription has been sent to your pharmacy for Lovenox 40mg injection once daily x 28 days.  -Follow-up: Please call the office to schedule a follow-up appointment with Dr. Fritz within 1-2 weeks, or follow-up as previously scheduled.  -Please call the office or return to the ED with persistent fever greater than 100.4F, chest pain, shortness of breath, uncontrollable nausea/vomiting/abdominal pain, constipation, bloody bowel movements, abdominal distention, inability to tolerate oral intake.      SECONDARY DISCHARGE DIAGNOSES  Diagnosis: Breast mass  Assessment and Plan of Treatment: s/p right core needle biopsy  -Follow-up: Call to schedule a follow-up appointment with Dr. Quinonez within 2 weeks.    Diagnosis: HTN (hypertension)  Assessment and Plan of Treatment: -Medications: The following prescriptions have been sent to your pharmacy for Chlorthalidone 12.5mg daily and Amlodipine 5mg daily.  -Follow-up: Please call to schedule a follow-up appointment with your primary care provider within 2 weeks. The [facility] information has been provided to you if you do not have a primary care provider.

## 2021-11-30 NOTE — DISCHARGE NOTE PROVIDER - PROVIDER TOKENS
PROVIDER:[TOKEN:[[id number]],FOLLOWUP:[2 weeks]],PROVIDER:[TOKEN:[[id number]],FOLLOWUP:[2 weeks]]

## 2021-11-30 NOTE — DISCHARGE NOTE PROVIDER - NSDCMRMEDTOKEN_GEN_ALL_CORE_FT
acetaminophen 325 mg oral tablet: 2 tab(s) orally every 6 hours  amLODIPine 5 mg oral tablet: 1 tab(s) orally once a day MDD:1 tablet  chlorthalidone 25 mg oral tablet: 0.5 tab(s) orally once a day MDD:1/2 tablet  enoxaparin: 40 milligram(s) subcutaneous once a day

## 2021-11-30 NOTE — PROGRESS NOTE ADULT - ATTENDING COMMENTS
71F with no known medical history who presents with abdominal pain distention constipation.  Found to have large bowel obstruction due to sigmoid mass and breast mass on CTAP.    POD6 s/p laparoscopic creation of transverse loop colostomy    Patient seen and examined.  Patient reports incisional pain. S/p flexible sigmoidoscopy and biopsy    Abdomen - soft, non distended, obese, appropriately tender.  RUQ stoma with viable afferent and efferent opening with necrosis of bridging colon. Productive of gas and stool.    Vitals labs and images reviewed    CTAP - Large bowel obstruction secondary to a likely malignant stricture in the proximal sigmoid colon. Please correlate with correlate endoscopy.  Small volume ascites.  Lobulated, partially visualized right breast mass and left breast nodule. Physical exam correlation and correlation with dedicated breast imaging is recommended as malignancy is of concern.  CEA 2.1    CT chest - 1. Compared with the previous scan of the abdomen pelvis dated 11/24/2021, the previously noted right and left breast masses are again identified. The right breast mass measures 6.5 x 3.8 x 7.0 cm and contains small calcifications. The right breast mass measures 3.1 x 2.9 x 2.6 cm.    2. There are no suspicious lung nodules.    Plan  - LFD  - IVF  - appreciate breast surgery input  - f/u pathology  - WOCN teaching  - MS home today with home stoma care.  Patient to be presented at tumor board for treatment plan

## 2021-11-30 NOTE — DISCHARGE NOTE PROVIDER - CARE PROVIDERS DIRECT ADDRESSES
[email or web address]

## 2021-11-30 NOTE — ADVANCED PRACTICE NURSE CONSULT - ASSESSMENT
70 y/o F who hasn't seen a doctor in 40 years came to the ED (11/24) with intermittent abdominal pain. Patient reports abd pain started last Friday also hasn't had a bowel movement since fFriday. Patient also had 2x episode of vomiting on Saturday and Sunday. Came to the ED as abdominal pain has not subsided.CT revealed sigmoid mass with large bowel obstruction. Of note right breast mass and left breast nodule seen. Now s/p  laparoscopic transverse loop colostomy-11/24.     Received patient on 4C, sitting up at edge of bed eating lunch, awake, A&Ox3, with recognition of WOCN from previous visit, pt's  at bedside, both made aware of purpose of WOCN visit, agreeable to consult. Colostomy to RUQ w/ Rico 2 3/4" drainable pouching system applied by staff RN Sunday 11/28 in place, patient reports top of barrier was soiled after pouch was disconnected from barrier by staff necessitating pouch change on Sunday. Currently leakage present underneath barrier at 9 o;clock, pouch gently removed from pt's abdomen w/ water moistened gauze, stool leakage present on skin from 6-9 o'clock. Ostomy ring not utilized with current pouching system    Type of ostomy: Transverse loop colostomy  Location: RUQ  Saqib: d/c'ed by Colorectal team yesterday   Size: remains oval in shape, unable to be rounded out, now measuring 1 3/8" x 2 1/4"   Mucosa: budded, 25% dark red tissue, 75% covered w/ adherent yellow-tan devitalized tissue, Dr. Fritz aware  Peristomal skin: slight peristomal irritant dermatitis at 9 o'clock; diffuse healing bruising throughout abdomen   Mucocutaneous junction: intact  Abdominal contours: round, semi-soft, slight firm around stoma, convex test negative  Output: small amount of liquid brown effluent present in removed pouch, + flatus from stoma during WOCN visit   Midline/lap sites/ drains: scattered abdominal lap sites ALIS, approximated w/ sugri-glue, c/d/i     Peristomal skin crusted w/ Garland Adapt stoma powder (#0479) & Cavilon no-sting barrier film (#7152). Patient re-pouched w/ Garland 2 ¾” CeraPlus flat barrier #59689 (cut to 1 3/8"  x 2 1/4"), Rico Adapt flat CeraRing #0831, and Rico  2 ¾” drainable pouch w/ lock & roll closure #56306. Full ostomy lesson provided to patient & .    Impression:  Loop transverse colostomy to RUQ-given pt's stomal characteristics and abdominal topography, pt still requires flat pouching system w/ use of flat ring to absorb excess moisture and protect peristomal skin w/ shrinking post-op stomal size    Patient &  both ready & willing to learn ostomy care, fully attentive during pouch change and ostomy lesson, asking appropriate questions; both able to verbalize key ostomy points as discussed during lesson. Patient &  shown how to open/close pouch to empty effluent & how to vent flatus from unfiltered pouch.  Again instructed patient to practice pouch emptying & flatus venting w/ staff assistance as these skills are required prior to d/c home.    Reviewed with patient dietary restrictions, stomal obstruction s/s and prevention and lifestyle modifications (clothing, bathing, physical activity, etc).  Patient verbalized understanding of all information given. Written information regarding all above topics provided to patient.

## 2021-11-30 NOTE — DISCHARGE NOTE NURSING/CASE MANAGEMENT/SOCIAL WORK - PATIENT PORTAL LINK FT
You can access the FollowMyHealth Patient Portal offered by [facility] by registering at the following website: [email or web address]. By joining [facility]’s FollowMyHealth portal, you will also be able to view your health information using other applications (apps) compatible with our system.

## 2021-11-30 NOTE — CONSULT NOTE ADULT - CONSULT REASON
Pre op medical clearance.
elevated bp
Right breast mass, left breast nodule
large bowel obstruction
pre-op/HTN

## 2021-11-30 NOTE — DISCHARGE NOTE PROVIDER - NSFOLLOWUPCLINICS_GEN_ALL_ED_FT
[facility]  Medicine  [address], [place]   Phone: [phone]  Fax:   Follow Up Time: 2 weeks

## 2021-11-30 NOTE — DISCHARGE NOTE NURSING/CASE MANAGEMENT/SOCIAL WORK - NSDCPEFALRISK_GEN_ALL_CORE
For information on Fall & Injury Prevention, visit: [email or web address] OR  [email or web address] OR  https://www.cdc.gov/steadi/patient.html

## 2021-11-30 NOTE — DISCHARGE NOTE PROVIDER - CARE PROVIDER_API CALL
Roland Fritz)  ColonRectal Surgery; Surgery  [address] [facility], [facility], [address]rd [place] [address]  Phone: [phone]  Fax: [phone]  Follow Up Time: 2 weeks    Evelina Quinonez)  Surgery  [address], [address] [place] [address]  Phone: [phone]  Fax: [phone]  Follow Up Time: 2 weeks

## 2021-11-30 NOTE — ADVANCED PRACTICE NURSE CONSULT - RECOMMEDATIONS
Pouch change: 	  -Gently remove pouch water moistened gauze   -Cleanse stoma site with water moistened gauze, gently pat dry  -Measure stoma, currently 1 3/8" x 2 1/4"  -Trace and cut current size on Windsor 2 ¾” CeraPlus flat barrier (#46602)  -Stretch Rico Adapt CeraRing (#5678) onto back of barrier  -Apply barrier to patient's skin  -Attach Rico 2 ¾” drainable lock and roll pouch (#03412) onto barrier  Empty pouch when 1/3 to no more than 1/2 full of effluent/flatus. Change pouching system q 3 - 4 days and prn for leaking. Next pouch change-Fri 12/3.    Plan of Care:  Patient to be d/c'ed to home today w/ home nursing services. All d/c paperwork and supplies provided to patient during previous visit on Friday 11/26. Supplies include:  Windsor  2 3/4” CeraPlus flat barrier #03788  Rico 2 3/4” drainable pouch w/ gas filter & lock/roll closure #74820 (beige), #16723 (transparent)  Windsor Adapt flat CeraRing #2605  Windsor adhesive remover #3885  Rico Adapt stoma powder #3306  3M Cavilon no-sting barrier film #1219  Adapt lubricating deodorant #27019    Patient to follow with MD upon d/c for continued post-op management & care/treatment.  Patient given [facility] contact info and instructed to call w/ questions/concerns. With pt’s permission, enrolled patient in [employer] Secure Start Program. Signing off on patient, no further needs/recs from [facility] service.  Questions or concerns, please contact [facility], [facility] #[phone].

## 2021-11-30 NOTE — CONSULT NOTE ADULT - ASSESSMENT
71 y.o. female without any known PMH (hasn't seen a DrLexis in 41 years) presented to the ER c/o abdominal bloating, poor appetite, and constipation for the past 4-5 days.       #  Large bowel obstruction due to sigmoid mass  - CT:  Liquid stool containing colon is distended and thickened with a maximum cecal diameter of 8.8 cm. There is a 5 cm segment of masslike stricturing in the proximal sigmoid colon. A tiny adjacent lymph node is noted in the sigmoid mesentery. The distal sigmoid colon and rectum are decompressed. No pneumatosis. Lobulated, partially visualized right breast mass and left breast nodule.  - NG-tube placed, 100 cc fluid came out.  - abdomen soft    Rec:  - NPO  - Serial abdominal exam  - Monitor electrolyte and CBC  - NO endoscopic intervention at this stage  - surgery eval 
ASSESSMENT:  71yF who hasn't seen PCP in 40 years presents with distended abdomen, and not passing flatus and bowel movements. CT revealed sigmoid mass with large bowel obstruction. Of note right breast mass and left breast nodule seen.    PLAN:  -Will plan for  core needle biopsy before discharge      Lines/Tubes: PIV,    Above plan discussed with Attending Surgeon Dr. Quinonez , patient, patient family, and Primary team  11-25-21 @ 08:19  
  71yF who hasn't seen PCP in 40 years presents with distended abdomen, and not passing flatus and bowel movements. CT revealed sigmoid mass with large bowel obstruction. Of note right breast mass and left breast nodule seen.    The patient was seen and examined at bedside.  Found to have Large bowel obstruction.   Denies SOB, CP, Palpitation.  Fully functional.   No known PMH.    METS >4  RCRI =1  Moderate risk for High risk surgery ( Loop Colostomy ).    Accelerated HTN > Might be due to stress/ anxiety vs Chronic  Control as needed pre op. If SBP remains >200 try IV Labetalol 5-10mg  Dont lower SBP < 150 IN next 6-8 hrs.  Agree with Adding Nifedipine post op if still remains Hypertensive.  Get TTE, can be post op.    Plan d/w patient and  at bedside.  Reconsult PRN.   
IMPRESSION:  - Large bowel obstruction needs diverting colostomy with possible bowel resection  - METS>4, good functional status  - No chest pain, no BELCHER, no orthopnea, no PND, no palpitations, no ABDULKADIR  - No family history of CAD, non smoker  - Hypertension to 230/110 s/p labetalol - 180/99mmhg now (BP can be a chronic hypertension that was undiagnosed or reactive due to stress/pain/news about her condition)    PLAN:  - Moderate risk patient for a high risk surgery (emergent surgery is high-risk by definition)  - Can use labetalol IV as needed for BP control pre-op  - Post-op, if patient remains hypertensive, can start CCB (nifedipine 60mg daily po)  - TTE  - will follow
71 yr old female without any known PMH (hasn't seen a DrLexis in 41 years) presented to the ER c/o abdominal bloating, poor appetite, and constipation for the past 4-5 days. Found to have sigmoid mass. Medicine consult for persistent elevated bp.    # Hypertensive Urgency  - bp 197/93 --> 174/74 --> 170/78  - pt bp persistently elevated throughout her stay   - give stat Labetalol 5mg IV push and amlodipine 5mg PO stat  - discharge home with Chlorthalidone 12.5mg daily and amlodipine 5mg daily   - Assign PMD prior to discharge  - outpt echo

## 2021-12-06 ENCOUNTER — APPOINTMENT (OUTPATIENT)
Dept: SURGERY | Facility: CLINIC | Age: 71
End: 2021-12-06
Payer: MEDICARE

## 2021-12-06 ENCOUNTER — EMERGENCY (EMERGENCY)
Facility: HOSPITAL | Age: 71
LOS: 0 days | Discharge: HOME | End: 2021-12-06
Attending: EMERGENCY MEDICINE | Admitting: EMERGENCY MEDICINE
Payer: MEDICARE

## 2021-12-06 VITALS
WEIGHT: 190 LBS | HEIGHT: 66 IN | HEART RATE: 130 BPM | SYSTOLIC BLOOD PRESSURE: 170 MMHG | BODY MASS INDEX: 30.53 KG/M2 | DIASTOLIC BLOOD PRESSURE: 110 MMHG | TEMPERATURE: 98 F

## 2021-12-06 VITALS — DIASTOLIC BLOOD PRESSURE: 88 MMHG | SYSTOLIC BLOOD PRESSURE: 187 MMHG | HEART RATE: 86 BPM

## 2021-12-06 VITALS
RESPIRATION RATE: 18 BRPM | HEART RATE: 134 BPM | DIASTOLIC BLOOD PRESSURE: 98 MMHG | OXYGEN SATURATION: 100 % | TEMPERATURE: 98 F | HEIGHT: 66 IN | SYSTOLIC BLOOD PRESSURE: 193 MMHG

## 2021-12-06 DIAGNOSIS — I10 ESSENTIAL (PRIMARY) HYPERTENSION: ICD-10-CM

## 2021-12-06 DIAGNOSIS — Z93.3 COLOSTOMY STATUS: ICD-10-CM

## 2021-12-06 DIAGNOSIS — C18.9 MALIGNANT NEOPLASM OF COLON, UNSPECIFIED: ICD-10-CM

## 2021-12-06 DIAGNOSIS — Z79.01 LONG TERM (CURRENT) USE OF ANTICOAGULANTS: ICD-10-CM

## 2021-12-06 DIAGNOSIS — R05.9 COUGH, UNSPECIFIED: ICD-10-CM

## 2021-12-06 DIAGNOSIS — R00.0 TACHYCARDIA, UNSPECIFIED: ICD-10-CM

## 2021-12-06 LAB
ALBUMIN SERPL ELPH-MCNC: 4.4 G/DL — SIGNIFICANT CHANGE UP (ref 3.5–5.2)
ALP SERPL-CCNC: 95 U/L — SIGNIFICANT CHANGE UP (ref 30–115)
ALT FLD-CCNC: 39 U/L — SIGNIFICANT CHANGE UP (ref 0–41)
ANION GAP SERPL CALC-SCNC: 19 MMOL/L — HIGH (ref 7–14)
AST SERPL-CCNC: 24 U/L — SIGNIFICANT CHANGE UP (ref 0–41)
BASOPHILS # BLD AUTO: 0.06 K/UL — SIGNIFICANT CHANGE UP (ref 0–0.2)
BASOPHILS NFR BLD AUTO: 1.1 % — HIGH (ref 0–1)
BILIRUB SERPL-MCNC: 0.4 MG/DL — SIGNIFICANT CHANGE UP (ref 0.2–1.2)
BUN SERPL-MCNC: 14 MG/DL — SIGNIFICANT CHANGE UP (ref 10–20)
CALCIUM SERPL-MCNC: 9.1 MG/DL — SIGNIFICANT CHANGE UP (ref 8.5–10.1)
CHLORIDE SERPL-SCNC: 101 MMOL/L — SIGNIFICANT CHANGE UP (ref 98–110)
CO2 SERPL-SCNC: 19 MMOL/L — SIGNIFICANT CHANGE UP (ref 17–32)
CREAT SERPL-MCNC: 0.8 MG/DL — SIGNIFICANT CHANGE UP (ref 0.7–1.5)
D DIMER BLD IA.RAPID-MCNC: 3085 NG/ML DDU — HIGH (ref 0–230)
EOSINOPHIL # BLD AUTO: 0.16 K/UL — SIGNIFICANT CHANGE UP (ref 0–0.7)
EOSINOPHIL NFR BLD AUTO: 2.9 % — SIGNIFICANT CHANGE UP (ref 0–8)
GLUCOSE SERPL-MCNC: 124 MG/DL — HIGH (ref 70–99)
HCT VFR BLD CALC: 42.2 % — SIGNIFICANT CHANGE UP (ref 37–47)
HGB BLD-MCNC: 14.1 G/DL — SIGNIFICANT CHANGE UP (ref 12–16)
IMM GRANULOCYTES NFR BLD AUTO: 0.4 % — HIGH (ref 0.1–0.3)
LYMPHOCYTES # BLD AUTO: 0.76 K/UL — LOW (ref 1.2–3.4)
LYMPHOCYTES # BLD AUTO: 13.6 % — LOW (ref 20.5–51.1)
MCHC RBC-ENTMCNC: 28.6 PG — SIGNIFICANT CHANGE UP (ref 27–31)
MCHC RBC-ENTMCNC: 33.4 G/DL — SIGNIFICANT CHANGE UP (ref 32–37)
MCV RBC AUTO: 85.6 FL — SIGNIFICANT CHANGE UP (ref 81–99)
MONOCYTES # BLD AUTO: 0.38 K/UL — SIGNIFICANT CHANGE UP (ref 0.1–0.6)
MONOCYTES NFR BLD AUTO: 6.8 % — SIGNIFICANT CHANGE UP (ref 1.7–9.3)
NEUTROPHILS # BLD AUTO: 4.2 K/UL — SIGNIFICANT CHANGE UP (ref 1.4–6.5)
NEUTROPHILS NFR BLD AUTO: 75.2 % — SIGNIFICANT CHANGE UP (ref 42.2–75.2)
NRBC # BLD: 0 /100 WBCS — SIGNIFICANT CHANGE UP (ref 0–0)
PLATELET # BLD AUTO: 294 K/UL — SIGNIFICANT CHANGE UP (ref 130–400)
POTASSIUM SERPL-MCNC: 3.8 MMOL/L — SIGNIFICANT CHANGE UP (ref 3.5–5)
POTASSIUM SERPL-SCNC: 3.8 MMOL/L — SIGNIFICANT CHANGE UP (ref 3.5–5)
PROT SERPL-MCNC: 6.8 G/DL — SIGNIFICANT CHANGE UP (ref 6–8)
RBC # BLD: 4.93 M/UL — SIGNIFICANT CHANGE UP (ref 4.2–5.4)
RBC # FLD: 13.6 % — SIGNIFICANT CHANGE UP (ref 11.5–14.5)
SODIUM SERPL-SCNC: 139 MMOL/L — SIGNIFICANT CHANGE UP (ref 135–146)
TROPONIN T SERPL-MCNC: <0.01 NG/ML — SIGNIFICANT CHANGE UP
WBC # BLD: 5.58 K/UL — SIGNIFICANT CHANGE UP (ref 4.8–10.8)
WBC # FLD AUTO: 5.58 K/UL — SIGNIFICANT CHANGE UP (ref 4.8–10.8)

## 2021-12-06 PROCEDURE — 71045 X-RAY EXAM CHEST 1 VIEW: CPT | Mod: 26

## 2021-12-06 PROCEDURE — 71275 CT ANGIOGRAPHY CHEST: CPT | Mod: 26,MA

## 2021-12-06 PROCEDURE — 99024 POSTOP FOLLOW-UP VISIT: CPT

## 2021-12-06 PROCEDURE — 99285 EMERGENCY DEPT VISIT HI MDM: CPT

## 2021-12-06 PROCEDURE — 93010 ELECTROCARDIOGRAM REPORT: CPT

## 2021-12-06 RX ORDER — SODIUM CHLORIDE 9 MG/ML
1000 INJECTION, SOLUTION INTRAVENOUS ONCE
Refills: 0 | Status: COMPLETED | OUTPATIENT
Start: 2021-12-06 | End: 2021-12-06

## 2021-12-06 RX ADMIN — SODIUM CHLORIDE 1000 MILLILITER(S): 9 INJECTION, SOLUTION INTRAVENOUS at 15:36

## 2021-12-06 NOTE — ED PROVIDER NOTE - NS ED ROS FT
CONST: No fever, chills or bodyaches  EYES: No pain, redness, drainage or visual changes.  ENT: No ear pain or discharge, nasal discharge or congestion. No sore throat  CARD: No chest pain, (+) palpitations  RESP: No SOB, cough, hemoptysis. No hx of asthma or COPD  GI: No abdominal pain, N/V/D  : No urinary symptoms  MS: No joint pain, back pain or extremity pain/injury  SKIN: No rashes  NEURO: No headache, dizziness, paresthesias or LOC

## 2021-12-06 NOTE — ASSESSMENT
[FreeTextEntry1] : 71F with concurrent breast and obstructing colon cancer s/p diverting loop colostomy\par \par I had a long conversation with the patient regarding her two malignancies.  We discussed the need to present the case at tumor board to determine the best course of action.  Given her irregular tachycardia, we will send the patient to the ER for evaluation as she may have developed atrial fibrillation.

## 2021-12-06 NOTE — HISTORY OF PRESENT ILLNESS
[FreeTextEntry1] : Patient is a 71F with no PMH who is s/p transverse loop colostomy creation for LBO secondary to obstructing sigmoid adenocarcinoma.  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  She presents today for follow up.  Patient states she feels well.  She is tolerating a diet and her stoma is working well. Patient denies fevers, chills, nausea, vomiting, abdominal pain, constipation, diarrhea, blood in his stool or unexpected weight loss.  Patient denies a family history of colon cancer rectal cancer or inflammatory bowel disease.  Patient has never had a colonoscopy.\par

## 2021-12-06 NOTE — ED PROVIDER NOTE - OBJECTIVE STATEMENT
70 y/o female with a PMH of HTN, and colostomy bag s/p large bowel obstruction 11/24/2021 performed by Dr. Fritz and just dx with colon ca today presents to the ED sent in by Dr. Fritz's office due to tachycardia. pt reports she was at Dr. Fritz's office for a follow up appointment today. pt reports she was given the results of her biopsy and dx with colon ca. pt reports she also had a breast biopsy and is pending results, but is suspected to have breast ca as well. pt reports in the office she felt her heart was racing but it has resolved. pt reports cough x 1 week. pt is on lovenox injections since being dc from hospital 11/30. pt denies fever, chills, chest pain, sob, abdominal pain, n/v/d/c, blood in the urine or stool, rashes, leg pain, leg swelling, recent travel, recent trauma, hx of blood clots, or use of hormones.

## 2021-12-06 NOTE — ED PROVIDER NOTE - NSFOLLOWUPINSTRUCTIONS_ED_ALL_ED_FT
Sinus tachycardia is a kind of fast heartbeat. In sinus tachycardia, the heart beats more than 100 times a minute. Sinus tachycardia starts in a part of the heart called the sinus node. Sinus tachycardia may be harmless, or it may be a sign of a serious condition.  What are the causes?  This condition may be caused by:  Exercise or exertion.A fever.Pain.Loss of body fluids (dehydration).Severe bleeding (hemorrhage).Anxiety and stress.Certain substances, including:  Alcohol.Caffeine.Tobacco and nicotine products.Cold medicines.Illegal drugs.Medical conditions including:  Heart disease.An infection.An overactive thyroid (hyperthyroidism).A lack of red blood cells (anemia).What are the signs or symptoms?  Symptoms of this condition include:  A feeling that the heart is beating quickly (palpitations).Suddenly noticing your heartbeat (cardiac awareness).Dizziness.Tiredness (fatigue).Shortness of breath.Chest pain.Nausea.Fainting.How is this diagnosed?  This condition is diagnosed with:  A physical exam.Other tests, such as:  Blood tests.An electrocardiogram (ECG). This test measures the electrical activity of the heart.Ambulatory cardiac monitor. This records your heartbeats for 24 hours or more.You may be referred to a heart specialist (cardiologist).  How is this treated?  Treatment for this condition depends on the cause or the underlying condition. Treatment may involve:  Treating the underlying condition.Taking new medicines or changing your current medicines as told by your health care provider.Making changes to your diet or lifestyle.Follow these instructions at home:  Lifestyle     Do not use any products that contain nicotine or tobacco, such as cigarettes and e-cigarettes. If you need help quitting, ask your health care provider.Do not use illegal drugs, such as cocaine.Learn relaxation methods to help you when you get stressed or anxious. These include deep breathing.Avoid caffeine or other stimulants.Alcohol use     Do not drink alcohol if:  Your health care provider tells you not to drink.You are pregnant, may be pregnant, or are planning to become pregnant.If you drink alcohol, limit how much you have:  0–1 drink a day for women.0–2 drinks a day for men.Be aware of how much alcohol is in your drink. In the [place] one drink equals one typical bottle of beer (12 oz), one-half glass of wine (5 oz), or one shot of hard liquor (1½ oz).General instructions     Drink enough fluids to keep your urine pale yellow.Take over-the-counter and prescription medicines only as told by your health care provider.Keep all follow-up visits as told by your health care provider. This is important.Contact a health care provider if you have:  A fever.Vomiting or diarrhea that does not go away.Get help right away if you:  Have pain in your chest, upper arms, jaw, or neck.Become weak or dizzy.Feel faint.Have palpitations that do not go away.Summary  In sinus tachycardia, the heart beats more than 100 times a minute.Sinus tachycardia may be harmless, or it may be a sign of a serious condition.Treatment for this condition depends on the cause or the underlying condition.Get help right away if you have pain in your chest, upper arms, jaw, or neck.This information is not intended to replace advice given to you by your health care provider. Make sure you discuss any questions you have with your health care provider.

## 2021-12-06 NOTE — ED PROVIDER NOTE - PATIENT PORTAL LINK FT
You can access the FollowMyHealth Patient Portal offered by [facility] by registering at the following website: [email or web address]. By joining [employer]’s FollowMyHealth portal, you will also be able to view your health information using other applications (apps) compatible with our system.

## 2021-12-06 NOTE — ED PROVIDER NOTE - PHYSICAL EXAMINATION
Physical Exam    Vital Signs: I have reviewed the initial vital signs.  Constitutional: well-nourished, appears stated age, no acute distress  Eyes: Conjunctiva pink, Sclera clear  Cardiovascular: (+) tachycardic, regular rhythm, well-perfused extremities, radial pulses equal and 2+ b/l.   Respiratory: unlabored respiratory effort, clear to auscultation bilaterally no wheezing, rales and rhonchi. pt is speaking full sentences. no accessory muscle use.   Gastrointestinal: soft, non-tender, nondistended abdomen, no pulsatile mass, normal bowl sounds, no rebound, no guarding. (+) colostomy bag intact and filling with brow stool  Musculoskeletal: supple neck, no lower extremity edema, no calf tenderness, FROM of b/l upper and lower extremities.   Integumentary: warm, dry, no rash  Neurologic: awake, alert, cranial nerves II-XII grossly intact, extremities’ motor and sensory functions grossly intact. steady gait.   Psychiatric: appropriate mood, appropriate affect

## 2021-12-06 NOTE — PHYSICAL EXAM
[Abdomen Masses] : No abdominal masses [Abdomen Tenderness] : ~T No ~M abdominal tenderness [No HSM] : no hepatosplenomegaly [Respiratory Effort] : normal respiratory effort [de-identified] : soft, obese, non tender.  RUQ loop colostomy pink and viable productive of gas and stool.  Small areas of superficial necrosis in the periphery. [de-identified] : awake, alert and in no acute distress [de-identified] : tachycardic to 130s with irregular heartbeat

## 2021-12-06 NOTE — ED PROVIDER NOTE - ATTENDING CONTRIBUTION TO CARE
72 y/o female h/o HTN, colon CA (?mets), bowel obstruction s/p colostomy p/w asymptomatic tachycardia noted on routine eval today, persistent, denies modifying factors, states is compliant with home medications, denies fever, hemoptysis, orthopnea, PND, chest pain, LE pain or swelling, recent immobilization or travel or other associated complaints at present.    Old chart reviewed.  I have reviewed and agree with the initial nursing note, except as documented in my note.    VSS, awake, alert, non-toxic appearing, sitting comfortably, in NAD, oropharynx clear, no skin rash or lesions, no tracheal deviation, non-labored breathing without accessory muscle use apparent or pursed lip breathing, no retractions, speaks full sentences, chest CTAB, no w/r/r, +S1/S2, RRR, no m/r/g, abdomen soft, NT, ND, +BS, AO x 3, clear speech and steady gait.

## 2021-12-06 NOTE — ED PROVIDER NOTE - PROGRESS NOTE DETAILS
FF: discussed radiology and lab results with pt. pt was already aware of the breast masses and had them biopsied is pending results. pt has newly diagnosed HTN, and has just started medication for it about a week ago, and was told it may take time to decrease the BP. pt heart rate improved. pt advised of return precautions discussed at bedside. agreeable to dc. BH: incidental tachycardia, afebrile, CT PE neg, labs WNL, resolved while in ED, d/w pt, amenable to outpt FU. The patient was given detailed return precautions and advised to return to the emergency department in 2-3 days if not improving or sooner if any new symptoms developed, symptoms worsened or for any concerns. The patient was offered the opportunity to ask questions and verbalized that they understand the diagnosis and discharge instructions.

## 2021-12-06 NOTE — ED PROVIDER NOTE - CARE PROVIDERS DIRECT ADDRESSES
,[email or web address],DirectAddress_Unknown

## 2021-12-06 NOTE — ED PROVIDER NOTE - CARE PROVIDER_API CALL
Abimael Barth; LUPE)  Electrophysiology Center  [address] [facility]. [address] [place] [address]  Phone: [phone]  Fax: [phone]  Follow Up Time: 1-3 Days    Tito Alva)  Cardiac Electrophysiology; Cardiology; Internal Medicine  [address] [place] [address]  Phone: [phone]  Fax: [phone]  Follow Up Time: 1-3 Days    Lio Banegas)  Cardiovascular Disease; Interventional Cardiology  [address] [place] [address]  Phone: [phone]  Fax: [phone]  Follow Up Time: 1-3 Days

## 2021-12-08 DIAGNOSIS — I16.0 HYPERTENSIVE URGENCY: ICD-10-CM

## 2021-12-08 DIAGNOSIS — R10.9 UNSPECIFIED ABDOMINAL PAIN: ICD-10-CM

## 2021-12-08 DIAGNOSIS — K59.00 CONSTIPATION, UNSPECIFIED: ICD-10-CM

## 2021-12-08 DIAGNOSIS — Z82.49 FAMILY HISTORY OF ISCHEMIC HEART DISEASE AND OTHER DISEASES OF THE CIRCULATORY SYSTEM: ICD-10-CM

## 2021-12-08 DIAGNOSIS — E66.9 OBESITY, UNSPECIFIED: ICD-10-CM

## 2021-12-08 DIAGNOSIS — C50.911 MALIGNANT NEOPLASM OF UNSPECIFIED SITE OF RIGHT FEMALE BREAST: ICD-10-CM

## 2021-12-08 DIAGNOSIS — R18.8 OTHER ASCITES: ICD-10-CM

## 2021-12-08 DIAGNOSIS — Z80.8 FAMILY HISTORY OF MALIGNANT NEOPLASM OF OTHER ORGANS OR SYSTEMS: ICD-10-CM

## 2021-12-08 DIAGNOSIS — C18.7 MALIGNANT NEOPLASM OF SIGMOID COLON: ICD-10-CM

## 2021-12-08 DIAGNOSIS — Z83.438 FAMILY HISTORY OF OTHER DISORDER OF LIPOPROTEIN METABOLISM AND OTHER LIPIDEMIA: ICD-10-CM

## 2021-12-08 DIAGNOSIS — I10 ESSENTIAL (PRIMARY) HYPERTENSION: ICD-10-CM

## 2021-12-13 ENCOUNTER — NON-APPOINTMENT (OUTPATIENT)
Age: 71
End: 2021-12-13

## 2021-12-21 ENCOUNTER — RESULT REVIEW (OUTPATIENT)
Age: 71
End: 2021-12-21

## 2021-12-21 ENCOUNTER — OUTPATIENT (OUTPATIENT)
Dept: OUTPATIENT SERVICES | Facility: HOSPITAL | Age: 71
LOS: 1 days | Discharge: HOME | End: 2021-12-21
Payer: MEDICARE

## 2021-12-21 ENCOUNTER — APPOINTMENT (OUTPATIENT)
Dept: BREAST CENTER | Facility: CLINIC | Age: 71
End: 2021-12-21
Payer: MEDICARE

## 2021-12-21 VITALS
BODY MASS INDEX: 29.73 KG/M2 | SYSTOLIC BLOOD PRESSURE: 144 MMHG | DIASTOLIC BLOOD PRESSURE: 82 MMHG | HEIGHT: 66 IN | TEMPERATURE: 97.8 F | WEIGHT: 185 LBS

## 2021-12-21 DIAGNOSIS — Z78.9 OTHER SPECIFIED HEALTH STATUS: ICD-10-CM

## 2021-12-21 DIAGNOSIS — R92.8 OTHER ABNORMAL AND INCONCLUSIVE FINDINGS ON DIAGNOSTIC IMAGING OF BREAST: ICD-10-CM

## 2021-12-21 PROCEDURE — 76641 ULTRASOUND BREAST COMPLETE: CPT | Mod: 26,50

## 2021-12-21 PROCEDURE — 77066 DX MAMMO INCL CAD BI: CPT | Mod: 26

## 2021-12-21 PROCEDURE — 99204 OFFICE O/P NEW MOD 45 MIN: CPT

## 2021-12-21 PROCEDURE — G0279: CPT | Mod: 26

## 2021-12-22 ENCOUNTER — APPOINTMENT (OUTPATIENT)
Dept: CARDIOLOGY | Facility: CLINIC | Age: 71
End: 2021-12-22
Payer: MEDICARE

## 2021-12-22 VITALS — BODY MASS INDEX: 29.73 KG/M2 | HEIGHT: 66 IN | WEIGHT: 185 LBS

## 2021-12-22 VITALS — SYSTOLIC BLOOD PRESSURE: 140 MMHG | HEART RATE: 110 BPM | DIASTOLIC BLOOD PRESSURE: 80 MMHG | RESPIRATION RATE: 18 BRPM

## 2021-12-22 DIAGNOSIS — Z82.5 FAMILY HISTORY OF ASTHMA AND OTHER CHRONIC LOWER RESPIRATORY DISEASES: ICD-10-CM

## 2021-12-22 DIAGNOSIS — Z82.49 FAMILY HISTORY OF ISCHEMIC HEART DISEASE AND OTHER DISEASES OF THE CIRCULATORY SYSTEM: ICD-10-CM

## 2021-12-22 DIAGNOSIS — Z78.9 OTHER SPECIFIED HEALTH STATUS: ICD-10-CM

## 2021-12-22 PROCEDURE — 99214 OFFICE O/P EST MOD 30 MIN: CPT

## 2021-12-22 PROCEDURE — 93000 ELECTROCARDIOGRAM COMPLETE: CPT

## 2021-12-22 NOTE — PHYSICAL EXAM

## 2021-12-22 NOTE — HISTORY OF PRESENT ILLNESS
[FreeTextEntry1] : Jesica is a 71 F with a RIGHT breast cancer, IDC, [id number], ER pos, NM neg, Her 2 neg, stage IIB, AJCC 8th edition. \par \par She first palpated a right breast mass over 1 year prior.  She does not think that it has changed in size and she denies any pain in that area.  She denies palpating any left breast masses and denies any nipple discharge, retraction or other skin changes. \par \par Of note, she was recently admitted to the hospital with symptoms of constipation and fullness.  She was found to have a large bowel obstruction secondary to a sigmoid colon cancer. She had to undergo a diverting colostomy (Dr. Fritz) and is being scheduled for a sigmoidectomy for invasive adenocarcinoma, moderately differentiated.\par \par She was found to have a palpable right breast mass during her admission so a punch biopsy was performed on her right breast mass on 2021 which revealed IDC, well to moderately differentiated, ER pos, NM neg, Her 2 neg, Ki 67: 3%. \par \par She has never had a mammogram or other breast imaging. \par \par While she was an inpatient, she had the following staging scans: \par 2021 -- CT A/P \par -5 cm segment of masslike stricturing in the proximal sigmoid colon. A tiny adjacent lymph node is noted in the sigmoid mesentery\par \par 2021 -- CT chest \par -degenerative changes of the spine \par -RIGHT breast mass, 6.5 x 3.8 x 7 cm with small calcifications \par -LEFT breast mass, 3.1 x 2.9 x 2.6 cm \par -no suspicious lung nodules \par \par 2021 -- colonoscopy \par -s/p biopsy of sigmoid mass -- invasive adenocarcinoma, moderately differentiated \par \par -s/p diverting loop colostomy -- functioning \par \par HISTORICAL RISK FACTORS: \par -no prior breast biopsies or surgeries \par -no family history of breast or ovarian cancer \par -, age at first live birth was 28 \par -no prior OCP use \par -no gyn surgeries\par

## 2021-12-22 NOTE — ASSESSMENT
[FreeTextEntry1] : Jesica is a 71 F with a RIGHT breast cancer, IDC, [id number], ER pos, CT neg, Her 2 neg, stage IIB, AJCC 8th edition. \par \par She also has sigmoid colon adenocarcinoma which has not been excised and a left breast mass which has not been biopsied. \par \par On exam, in her right breast, @12N3-7, she has a 7 cm mass without any overlying skin changes, no other suspicious masses palpated, no lymphadenopathy; in her left breast, @9N2, she has a 3 cm mobile mass without any overlying skin or nipple changes.\par \par She has not had any recent breast imaging.  She will be scheduled for a b/l dx mammogram and US and likely b/l US guided CNBx. \par \par She will also be scheduled for a breast MRI to evaluate for extent of disease.  \par She had a CT A/P on 11/24/2021 which revealed a 5 cm segment of masslike stricturing in the proximal sigmoid colon. A tiny adjacent lymph node is noted in the sigmoid mesentery. She also had a CT chest which revealed on 11/26/2021 a right breast mass, measuring 6.5 x 3.8 x 7.0 cm and contains small calcifications. The left breast mass measures 3.1 x 2.9 x 2.6 cm.\par \par She will be scheduled for a bone scan to rule out bony metastases. \par \par I will have her follow up after her workup is complete. \par If her left breast mass is cancer and ER positive, I will recommend for her to get her colon surgery before treating her breast cancer.  In the interim, I will have her see medical oncology for evaluation of endocrine therapy until she is ready to undergo surgery for her breast cancer. \par \par All of her questions were answered.  She knows to call with any further questions or concerns. \par \par PLAN: \par -b/l dx mammogram and US \par -breast MRI \par -bone scan to rule out metastatic disease \par -INVITAE PANEL GENETIC TESTING -- will discuss this on her next visit \par -f/up after

## 2021-12-22 NOTE — PAST MEDICAL HISTORY
[Postmenopausal] : The patient is postmenopausal [Menarche Age ____] : age at menarche was [unfilled] [History of Hormone Replacement Treatment] : has no history of hormone replacement treatment [Total Preg ___] : G[unfilled] [Live Births ___] : P[unfilled]  [Age At Live Birth ___] : Age at live birth: [unfilled] [FreeTextEntry5] : denies [FreeTextEntry6] : denies [FreeTextEntry7] : denies [FreeTextEntry8] : denies

## 2021-12-22 NOTE — PHYSICAL EXAM
[Normocephalic] : normocephalic [Atraumatic] : atraumatic [EOMI] : extra ocular movement intact [No Supraclavicular Adenopathy] : no supraclavicular adenopathy [No Cervical Adenopathy] : no cervical adenopathy [No Nipple Retraction] : no left nipple retraction [No Nipple Discharge] : no left nipple discharge [No Axillary Lymphadenopathy] : no left axillary lymphadenopathy [Soft] : abdomen soft [Not Tender] : non-tender [No Edema] : no edema [No Rashes] : no rashes [No Ulceration] : no ulceration [de-identified] : @12N3-7, she has a 7 cm mass without any overlying skin changes, no other suspicious masses palpated, no lymphadenopathy  [de-identified] : @9N2, she has a 3 cm mobile mass without any overlying skin or nipple changes

## 2021-12-22 NOTE — DATA REVIEWED
[FreeTextEntry1] : EXAM: CT ANGIO CHEST PULM ART WAWIC\par \par \par PROCEDURE DATE: 12/06/2021\par \par \par \par \par INTERPRETATION: CLINICAL HISTORY/REASON FOR EXAM: Tachycardia after diagnosed with colon cancer.\par \par TECHNIQUE: Multislice helical sections were obtained from the thoracic inlet to the lung bases during rapid administration of intravenous contrast. Thin sections were reconstructed through the pulmonary vasculature. 3D (MIP) reformats obtained.\par \par COMPARISON: CT CHEST 11/26/2021.\par \par FINDINGS:\par \par PULMONARY EMBOLUS: No evidence of acute pulmonary embolism.\par \par LUNGS, PLEURA, AIRWAYS: No lobar consolidation, mass, effusion, or pneumothorax. No evidence of central endobronchial obstruction. No bronchiectasis or honeycombing. No suspicious pulmonary nodule.\par \par THORACIC NODES: No mediastinal, hilar, supraclavicular, or axillary lymphadenopathy.\par \par MEDIASTINUM/GREAT VESSELS: No pericardial effusion. Heart size is within normal limits. The aorta and main pulmonary artery are of normal caliber.\par \par BONES/SOFT TISSUES: Stable right breast mass containing calcifications measuring 6.8 x 6.4 x 4.3 cm, and left breast mass measuring 2.8 x 2.6 x 2.4 cm. Degenerative changes of the spine.\par \par VISUALIZED UPPER ABDOMEN: Unremarkable.\par \par \par IMPRESSION:\par \par No evidence of acute pulmonary embolism.\par \par Previously identified right and left breast masses are stable in size as above.\par \par --- End of Report ---\par \par \par \par \par \par REMY VALDOVINOS MD; Resident Radiologist\par This document has been electronically signed.\par BROOKS CAMPBELL MD; Attending Interventional Radiologist\par This document has been electronically signed. Dec 6 2021 6:47PM\par \par EXAM: CT ABDOMEN AND PELVIS IC\par \par \par PROCEDURE DATE: 11/24/2021\par \par \par \par \par INTERPRETATION: CLINICAL INFORMATION: Abdominal pain\par \par COMPARISON: None.\par \par PROCEDURE:\par CT of the Abdomen and Pelvis was performed with intravenous contrast.\par Intravenous contrast: 100 ml Omnipaque 350. 0 ml discarded.\par Oral contrast: None.\par Sagittal and coronal reformats were performed.\par \par FINDINGS:\par \par LOWER CHEST: Partially imaged lobulated right breast mass with adjacent nodular infiltrative change measures at least 6 x 4 cm. A smooth bordered left breast nodule measures 2.8 x 2.7 cm.\par \par LIVER: Segment 2 cyst.\par BILE DUCTS: Normal caliber.\par GALLBLADDER: Within normal limits.\par SPLEEN: Within normal limits.\par PANCREAS: Within normal limits.\par ADRENALS: Within normal limits.\par KIDNEYS/URETERS: Within normal limits.\par \par BLADDER: Within normal limits.\par REPRODUCTIVE ORGANS: The uterus and adnexa are within normal limits.\par \par BOWEL: Liquid stool containing colon is distended and thickened with a maximum cecal diameter of 8.8 cm. There is a 5 cm segment of masslike stricturing in the proximal sigmoid colon. A tiny adjacent lymph node is noted in the sigmoid mesentery. The distal sigmoid colon and rectum are decompressed. No pneumatosis. Appendix is normal.\par PERITONEUM: Mild ascites. No fluid collection or free air.\par VESSELS: Atherosclerotic calcifications. Retroaortic left renal vein.\par RETROPERITONEUM: No lymphadenopathy.\par ABDOMINAL WALL: Within normal limits.\par BONES: Degenerative changes of the spine.\par \par IMPRESSION: Large bowel obstruction secondary to a likely malignant stricture in the proximal sigmoid colon. Please correlate with correlate endoscopy.\par \par Small volume ascites.\par \par Lobulated, partially visualized right breast mass and left breast nodule. Physical exam correlation and correlation with dedicated breast imaging is recommended as malignancy is of concern.\par \par Findings discussed with ERWIN Machuca by Dr. Cochran on 11/24/2021 at 2:15 PM with read back.\par \par \par --- End of Report ---\par \par \par \par \par \par \par KATARINA COCHRAN MD; Attending Radiologist\par This document has been electronically signed. Nov 24 2021 2:22PM

## 2021-12-22 NOTE — REASON FOR VISIT
[FreeTextEntry1] : right breast IDC, [id number], ER pos, MO neg, Her 2 neg, stage 3 right breast cancer, left breast mass

## 2021-12-22 NOTE — REVIEW OF SYSTEMS
[Fever] : no fever [Chills] : no chills [Abdominal Pain] : no abdominal pain [Vomiting] : no vomiting [Constipation] : no constipation [Diarrhea] : no diarrhea [Abn Vaginal Bleeding] : no unexplained vaginal bleeding [As Noted in HPI] : as noted in HPI [Skin Lesions] : no skin lesions [Skin Wound] : no skin wound [Breast Pain] : no breast pain [Breast Lump] : breast lump [Negative] : Heme/Lymph [FreeTextEntry7] : as per HPI

## 2021-12-28 ENCOUNTER — RX RENEWAL (OUTPATIENT)
Age: 71
End: 2021-12-28

## 2021-12-30 ENCOUNTER — RESULT REVIEW (OUTPATIENT)
Age: 71
End: 2021-12-30

## 2021-12-30 ENCOUNTER — OUTPATIENT (OUTPATIENT)
Dept: OUTPATIENT SERVICES | Facility: HOSPITAL | Age: 71
LOS: 1 days | Discharge: HOME | End: 2021-12-30
Payer: MEDICARE

## 2021-12-30 DIAGNOSIS — C96.9 MALIGNANT NEOPLASM OF LYMPHOID, HEMATOPOIETIC AND RELATED TISSUE, UNSPECIFIED: ICD-10-CM

## 2021-12-30 PROCEDURE — 19083 BX BREAST 1ST LESION US IMAG: CPT | Mod: LT

## 2021-12-30 PROCEDURE — 10035 PLMT SFT TISS LOCLZJ DEV 1ST: CPT | Mod: LT

## 2021-12-30 PROCEDURE — 77066 DX MAMMO INCL CAD BI: CPT | Mod: 26

## 2021-12-30 PROCEDURE — 19084 BX BREAST ADD LESION US IMAG: CPT | Mod: LT

## 2021-12-30 PROCEDURE — 76942 ECHO GUIDE FOR BIOPSY: CPT | Mod: LT

## 2021-12-30 PROCEDURE — 38505 NEEDLE BIOPSY LYMPH NODES: CPT | Mod: LT

## 2021-12-30 PROCEDURE — 88305 TISSUE EXAM BY PATHOLOGIST: CPT | Mod: 26

## 2021-12-30 PROCEDURE — 88360 TUMOR IMMUNOHISTOCHEM/MANUAL: CPT | Mod: 26

## 2021-12-30 PROCEDURE — 88342 IMHCHEM/IMCYTCHM 1ST ANTB: CPT | Mod: 26,59

## 2021-12-30 PROCEDURE — 88341 IMHCHEM/IMCYTCHM EA ADD ANTB: CPT | Mod: 26,59

## 2022-01-04 ENCOUNTER — NON-APPOINTMENT (OUTPATIENT)
Age: 72
End: 2022-01-04

## 2022-01-04 LAB — SURGICAL PATHOLOGY STUDY: SIGNIFICANT CHANGE UP

## 2022-01-05 ENCOUNTER — RESULT REVIEW (OUTPATIENT)
Age: 72
End: 2022-01-05

## 2022-01-05 ENCOUNTER — OUTPATIENT (OUTPATIENT)
Dept: OUTPATIENT SERVICES | Facility: HOSPITAL | Age: 72
LOS: 1 days | Discharge: HOME | End: 2022-01-05
Payer: MEDICARE

## 2022-01-05 DIAGNOSIS — R59.0 LOCALIZED ENLARGED LYMPH NODES: ICD-10-CM

## 2022-01-05 DIAGNOSIS — D24.2 BENIGN NEOPLASM OF LEFT BREAST: ICD-10-CM

## 2022-01-05 DIAGNOSIS — Z85.3 PERSONAL HISTORY OF MALIGNANT NEOPLASM OF BREAST: ICD-10-CM

## 2022-01-05 DIAGNOSIS — N63.10 UNSPECIFIED LUMP IN THE RIGHT BREAST, UNSPECIFIED QUADRANT: ICD-10-CM

## 2022-01-05 DIAGNOSIS — C50.211 MALIGNANT NEOPLASM OF UPPER-INNER QUADRANT OF RIGHT FEMALE BREAST: ICD-10-CM

## 2022-01-05 DIAGNOSIS — N63.20 UNSPECIFIED LUMP IN THE LEFT BREAST, UNSPECIFIED QUADRANT: ICD-10-CM

## 2022-01-05 DIAGNOSIS — N60.32 FIBROSCLEROSIS OF LEFT BREAST: ICD-10-CM

## 2022-01-05 PROCEDURE — 77049 MRI BREAST C-+ W/CAD BI: CPT | Mod: 26

## 2022-01-06 ENCOUNTER — NON-APPOINTMENT (OUTPATIENT)
Age: 72
End: 2022-01-06

## 2022-01-07 ENCOUNTER — NON-APPOINTMENT (OUTPATIENT)
Age: 72
End: 2022-01-07

## 2022-01-13 ENCOUNTER — APPOINTMENT (OUTPATIENT)
Dept: CARDIOLOGY | Facility: CLINIC | Age: 72
End: 2022-01-13
Payer: MEDICARE

## 2022-01-13 VITALS — HEART RATE: 76 BPM | RESPIRATION RATE: 18 BRPM

## 2022-01-13 VITALS — BODY MASS INDEX: 30.05 KG/M2 | HEIGHT: 66 IN | WEIGHT: 187 LBS

## 2022-01-13 VITALS — SYSTOLIC BLOOD PRESSURE: 136 MMHG | DIASTOLIC BLOOD PRESSURE: 80 MMHG

## 2022-01-13 DIAGNOSIS — N63.20 UNSPECIFIED LUMP IN THE LEFT BREAST, UNSPECIFIED QUADRANT: ICD-10-CM

## 2022-01-13 PROCEDURE — 93000 ELECTROCARDIOGRAM COMPLETE: CPT

## 2022-01-13 PROCEDURE — 99213 OFFICE O/P EST LOW 20 MIN: CPT

## 2022-01-13 NOTE — PHYSICAL EXAM

## 2022-01-17 ENCOUNTER — RESULT REVIEW (OUTPATIENT)
Age: 72
End: 2022-01-17

## 2022-01-17 ENCOUNTER — OUTPATIENT (OUTPATIENT)
Dept: OUTPATIENT SERVICES | Facility: HOSPITAL | Age: 72
LOS: 1 days | Discharge: HOME | End: 2022-01-17
Payer: MEDICARE

## 2022-01-17 DIAGNOSIS — C50.211 MALIGNANT NEOPLASM OF UPPER-INNER QUADRANT OF RIGHT FEMALE BREAST: ICD-10-CM

## 2022-01-17 PROCEDURE — 78306 BONE IMAGING WHOLE BODY: CPT | Mod: 26,MH

## 2022-01-17 PROCEDURE — 78803 RP LOCLZJ TUM SPECT 1 AREA: CPT | Mod: 26,MH

## 2022-01-18 ENCOUNTER — NON-APPOINTMENT (OUTPATIENT)
Age: 72
End: 2022-01-18

## 2022-01-19 ENCOUNTER — RESULT REVIEW (OUTPATIENT)
Age: 72
End: 2022-01-19

## 2022-01-19 ENCOUNTER — OUTPATIENT (OUTPATIENT)
Dept: OUTPATIENT SERVICES | Facility: HOSPITAL | Age: 72
LOS: 1 days | Discharge: HOME | End: 2022-01-19
Payer: MEDICARE

## 2022-01-19 PROCEDURE — 19082 BX BREAST ADD LESION STRTCTC: CPT | Mod: LT

## 2022-01-19 PROCEDURE — 88305 TISSUE EXAM BY PATHOLOGIST: CPT | Mod: 26

## 2022-01-19 PROCEDURE — 76098 X-RAY EXAM SURGICAL SPECIMEN: CPT | Mod: 26

## 2022-01-19 PROCEDURE — 19081 BX BREAST 1ST LESION STRTCTC: CPT | Mod: LT

## 2022-01-24 ENCOUNTER — APPOINTMENT (OUTPATIENT)
Dept: SURGERY | Facility: CLINIC | Age: 72
End: 2022-01-24
Payer: MEDICARE

## 2022-01-24 ENCOUNTER — RESULT REVIEW (OUTPATIENT)
Age: 72
End: 2022-01-24

## 2022-01-24 VITALS
SYSTOLIC BLOOD PRESSURE: 122 MMHG | BODY MASS INDEX: 29.89 KG/M2 | TEMPERATURE: 97.4 F | HEIGHT: 66 IN | WEIGHT: 186 LBS | DIASTOLIC BLOOD PRESSURE: 82 MMHG | HEART RATE: 98 BPM

## 2022-01-24 LAB — SURGICAL PATHOLOGY STUDY: SIGNIFICANT CHANGE UP

## 2022-01-24 PROCEDURE — 99024 POSTOP FOLLOW-UP VISIT: CPT

## 2022-01-25 ENCOUNTER — NON-APPOINTMENT (OUTPATIENT)
Age: 72
End: 2022-01-25

## 2022-01-25 DIAGNOSIS — N60.22 FIBROADENOSIS OF LEFT BREAST: ICD-10-CM

## 2022-01-25 DIAGNOSIS — N60.92 UNSPECIFIED BENIGN MAMMARY DYSPLASIA OF LEFT BREAST: ICD-10-CM

## 2022-01-25 DIAGNOSIS — N60.32 FIBROSCLEROSIS OF LEFT BREAST: ICD-10-CM

## 2022-01-25 DIAGNOSIS — R92.0 MAMMOGRAPHIC MICROCALCIFICATION FOUND ON DIAGNOSTIC IMAGING OF BREAST: ICD-10-CM

## 2022-01-25 DIAGNOSIS — N60.82 OTHER BENIGN MAMMARY DYSPLASIAS OF LEFT BREAST: ICD-10-CM

## 2022-01-25 DIAGNOSIS — R92.1 MAMMOGRAPHIC CALCIFICATION FOUND ON DIAGNOSTIC IMAGING OF BREAST: ICD-10-CM

## 2022-01-25 DIAGNOSIS — N64.2 ATROPHY OF BREAST: ICD-10-CM

## 2022-01-25 DIAGNOSIS — N64.1 FAT NECROSIS OF BREAST: ICD-10-CM

## 2022-01-26 ENCOUNTER — NON-APPOINTMENT (OUTPATIENT)
Age: 72
End: 2022-01-26

## 2022-01-26 NOTE — PHYSICAL EXAM
[Abdomen Masses] : No abdominal masses [Abdomen Tenderness] : ~T No ~M abdominal tenderness [No HSM] : no hepatosplenomegaly [Respiratory Effort] : normal respiratory effort [de-identified] : soft, obese, non tender.  RUQ loop colostomy pink and viable productive of gas and stool.  Small areas of superficial necrosis in the periphery. [de-identified] : awake, alert and in no acute distress [de-identified] : tachycardic to 130s with irregular heartbeat

## 2022-01-26 NOTE — CONSULT LETTER
[Dear  ___] : Dear  [unfilled], [Courtesy Letter:] : I had the pleasure of seeing your patient, [unfilled], in my office today. [Please see my note below.] : Please see my note below. [Consult Closing:] : Thank you very much for allowing me to participate in the care of this patient.  If you have any questions, please do not hesitate to contact me. [FreeTextEntry3] : Sincerely,\par \par Roland Fritz MD, Colon and Rectal Surgery\par \par [employer] [facility]\par [address] [place] [address]\par [facility], 3rd Floor\par [place], [place] [address]\par Tel [phone] ext [phone]\par Fax [phone]\par

## 2022-01-26 NOTE — HISTORY OF PRESENT ILLNESS
[FreeTextEntry1] : Patient is a 71F with no PMH who is s/p transverse loop colostomy creation for LBO secondary to obstructing sigmoid adenocarcinoma.  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  She has completed her breast ca work up and returns now to schedule colon surgery. She had CT chest abdomen and pelvis that showed the sigmoid lesion without evidence of metastatic disease.  CEA was 2.1. Patient states she feels well.  She is tolerating a diet and her stoma is working well. Patient denies fevers, chills, nausea, vomiting, abdominal pain, constipation, diarrhea, blood in his stool or unexpected weight loss.  Patient denies a family history of colon cancer rectal cancer or inflammatory bowel disease.

## 2022-01-26 NOTE — ASSESSMENT
[FreeTextEntry1] : 71F with concurrent breast and obstructing colon cancer s/p diverting loop colostomy\par \par I recommended that she undergo robot assisted laparoscopic sigmoidectomy, possible colostomy closure, possible open, possible ileostomy.  All risks benefits and alternatives were discussed including bleeding, infection, damage to surrounding structures including the ureters, anastomotic leak, anastomotic stricture, cardiopulmonary events, thromboembolic events and hernia. We expect a 3-7 day hospital stay and 6-8 week recovery. Patient expressed understanding and was in agreement with the plan.\par

## 2022-01-27 ENCOUNTER — OUTPATIENT (OUTPATIENT)
Dept: OUTPATIENT SERVICES | Facility: HOSPITAL | Age: 72
LOS: 1 days | Discharge: HOME | End: 2022-01-27
Payer: MEDICARE

## 2022-01-27 DIAGNOSIS — C18.7 MALIGNANT NEOPLASM OF SIGMOID COLON: ICD-10-CM

## 2022-01-27 PROCEDURE — G1004: CPT

## 2022-01-27 PROCEDURE — 74177 CT ABD & PELVIS W/CONTRAST: CPT | Mod: 26,MG

## 2022-02-01 ENCOUNTER — NON-APPOINTMENT (OUTPATIENT)
Age: 72
End: 2022-02-01

## 2022-02-03 ENCOUNTER — OUTPATIENT (OUTPATIENT)
Dept: OUTPATIENT SERVICES | Facility: HOSPITAL | Age: 72
LOS: 1 days | Discharge: HOME | End: 2022-02-03
Payer: MEDICARE

## 2022-02-03 VITALS
RESPIRATION RATE: 18 BRPM | DIASTOLIC BLOOD PRESSURE: 84 MMHG | TEMPERATURE: 98 F | HEART RATE: 88 BPM | SYSTOLIC BLOOD PRESSURE: 146 MMHG | WEIGHT: 188.5 LBS | HEIGHT: 65 IN | OXYGEN SATURATION: 100 %

## 2022-02-03 DIAGNOSIS — C18.7 MALIGNANT NEOPLASM OF SIGMOID COLON: ICD-10-CM

## 2022-02-03 DIAGNOSIS — Z01.818 ENCOUNTER FOR OTHER PREPROCEDURAL EXAMINATION: ICD-10-CM

## 2022-02-03 DIAGNOSIS — Z90.49 ACQUIRED ABSENCE OF OTHER SPECIFIED PARTS OF DIGESTIVE TRACT: Chronic | ICD-10-CM

## 2022-02-03 LAB
A1C WITH ESTIMATED AVERAGE GLUCOSE RESULT: 6.5 % — HIGH (ref 4–5.6)
ALBUMIN SERPL ELPH-MCNC: 4.5 G/DL — SIGNIFICANT CHANGE UP (ref 3.5–5.2)
ALP SERPL-CCNC: 90 U/L — SIGNIFICANT CHANGE UP (ref 30–115)
ALT FLD-CCNC: 12 U/L — SIGNIFICANT CHANGE UP (ref 0–41)
ANION GAP SERPL CALC-SCNC: 17 MMOL/L — HIGH (ref 7–14)
APPEARANCE UR: CLEAR — SIGNIFICANT CHANGE UP
APTT BLD: 33.3 SEC — SIGNIFICANT CHANGE UP (ref 27–39.2)
AST SERPL-CCNC: 13 U/L — SIGNIFICANT CHANGE UP (ref 0–41)
BASOPHILS # BLD AUTO: 0.06 K/UL — SIGNIFICANT CHANGE UP (ref 0–0.2)
BASOPHILS NFR BLD AUTO: 0.9 % — SIGNIFICANT CHANGE UP (ref 0–1)
BILIRUB SERPL-MCNC: 0.6 MG/DL — SIGNIFICANT CHANGE UP (ref 0.2–1.2)
BILIRUB UR-MCNC: NEGATIVE — SIGNIFICANT CHANGE UP
BLD GP AB SCN SERPL QL: SIGNIFICANT CHANGE UP
BUN SERPL-MCNC: 20 MG/DL — SIGNIFICANT CHANGE UP (ref 10–20)
CALCIUM SERPL-MCNC: 9.7 MG/DL — SIGNIFICANT CHANGE UP (ref 8.5–10.1)
CHLORIDE SERPL-SCNC: 101 MMOL/L — SIGNIFICANT CHANGE UP (ref 98–110)
CO2 SERPL-SCNC: 24 MMOL/L — SIGNIFICANT CHANGE UP (ref 17–32)
COLOR SPEC: YELLOW — SIGNIFICANT CHANGE UP
CREAT SERPL-MCNC: 1 MG/DL — SIGNIFICANT CHANGE UP (ref 0.7–1.5)
DIFF PNL FLD: NEGATIVE — SIGNIFICANT CHANGE UP
EOSINOPHIL # BLD AUTO: 0.17 K/UL — SIGNIFICANT CHANGE UP (ref 0–0.7)
EOSINOPHIL NFR BLD AUTO: 2.7 % — SIGNIFICANT CHANGE UP (ref 0–8)
ESTIMATED AVERAGE GLUCOSE: 140 MG/DL — HIGH (ref 68–114)
GLUCOSE SERPL-MCNC: 119 MG/DL — HIGH (ref 70–99)
GLUCOSE UR QL: NEGATIVE — SIGNIFICANT CHANGE UP
HCT VFR BLD CALC: 45.1 % — SIGNIFICANT CHANGE UP (ref 37–47)
HGB BLD-MCNC: 15 G/DL — SIGNIFICANT CHANGE UP (ref 12–16)
IMM GRANULOCYTES NFR BLD AUTO: 0.3 % — SIGNIFICANT CHANGE UP (ref 0.1–0.3)
INR BLD: 1.03 RATIO — SIGNIFICANT CHANGE UP (ref 0.65–1.3)
KETONES UR-MCNC: NEGATIVE — SIGNIFICANT CHANGE UP
LEUKOCYTE ESTERASE UR-ACNC: NEGATIVE — SIGNIFICANT CHANGE UP
LYMPHOCYTES # BLD AUTO: 0.96 K/UL — LOW (ref 1.2–3.4)
LYMPHOCYTES # BLD AUTO: 15.2 % — LOW (ref 20.5–51.1)
MCHC RBC-ENTMCNC: 28.1 PG — SIGNIFICANT CHANGE UP (ref 27–31)
MCHC RBC-ENTMCNC: 33.3 G/DL — SIGNIFICANT CHANGE UP (ref 32–37)
MCV RBC AUTO: 84.6 FL — SIGNIFICANT CHANGE UP (ref 81–99)
MONOCYTES # BLD AUTO: 0.33 K/UL — SIGNIFICANT CHANGE UP (ref 0.1–0.6)
MONOCYTES NFR BLD AUTO: 5.2 % — SIGNIFICANT CHANGE UP (ref 1.7–9.3)
MRSA PCR RESULT.: NEGATIVE — SIGNIFICANT CHANGE UP
NEUTROPHILS # BLD AUTO: 4.79 K/UL — SIGNIFICANT CHANGE UP (ref 1.4–6.5)
NEUTROPHILS NFR BLD AUTO: 75.7 % — HIGH (ref 42.2–75.2)
NITRITE UR-MCNC: NEGATIVE — SIGNIFICANT CHANGE UP
NRBC # BLD: 0 /100 WBCS — SIGNIFICANT CHANGE UP (ref 0–0)
PH UR: 6 — SIGNIFICANT CHANGE UP (ref 5–8)
PLATELET # BLD AUTO: 261 K/UL — SIGNIFICANT CHANGE UP (ref 130–400)
POTASSIUM SERPL-MCNC: 4.2 MMOL/L — SIGNIFICANT CHANGE UP (ref 3.5–5)
POTASSIUM SERPL-SCNC: 4.2 MMOL/L — SIGNIFICANT CHANGE UP (ref 3.5–5)
PROT SERPL-MCNC: 7.5 G/DL — SIGNIFICANT CHANGE UP (ref 6–8)
PROT UR-MCNC: SIGNIFICANT CHANGE UP
PROTHROM AB SERPL-ACNC: 11.8 SEC — SIGNIFICANT CHANGE UP (ref 9.95–12.87)
RBC # BLD: 5.33 M/UL — SIGNIFICANT CHANGE UP (ref 4.2–5.4)
RBC # FLD: 14.2 % — SIGNIFICANT CHANGE UP (ref 11.5–14.5)
SODIUM SERPL-SCNC: 142 MMOL/L — SIGNIFICANT CHANGE UP (ref 135–146)
SP GR SPEC: 1.02 — SIGNIFICANT CHANGE UP (ref 1.01–1.03)
UROBILINOGEN FLD QL: SIGNIFICANT CHANGE UP
WBC # BLD: 6.33 K/UL — SIGNIFICANT CHANGE UP (ref 4.8–10.8)
WBC # FLD AUTO: 6.33 K/UL — SIGNIFICANT CHANGE UP (ref 4.8–10.8)

## 2022-02-03 PROCEDURE — 93010 ELECTROCARDIOGRAM REPORT: CPT

## 2022-02-03 NOTE — H&P PST ADULT - NSICDXFAMILYHX_GEN_ALL_CORE_FT
FAMILY HISTORY:  Mother  Still living? No  Family history of COPD (chronic obstructive pulmonary disease), Age at diagnosis: Age Unknown

## 2022-02-03 NOTE — H&P PST ADULT - NSICDXPASTMEDICALHX_GEN_ALL_CORE_FT
PAST MEDICAL HISTORY:  Breast cancer recently dx 2021 no tx presently    Cancer, colon dx 2021 at the same time she was dx with breast ca    HTN (hypertension)

## 2022-02-03 NOTE — H&P PST ADULT - HISTORY OF PRESENT ILLNESS
Patient is a 71 year old female presenting to PAST in preparation for Laparoscopic sigmoidectomy, colostomy closure, possible open possible ostomy.  on 2/18 under gen  anesthesia by Dr. Fritz  Pt reports h/o colon cancer dx in nov 2021. she reports she was unable to have a bm x 4 day and presented to er. A ct scan revealed a mass. She was taken to OR and mass was resected and a  colostomy placed. She is unsure if she will need chemo or radiation but has been advised  to have above. Presently pt denies any pain or discomfort.  PATIENT CURRENTLY DENIES CHEST PAIN  SHORTNESS OF BREATH  PALPITATIONS,  DYSURIA, OR UPPER RESPIRATORY INFECTION IN PAST 2 WEEKS  EXERCISE  TOLERANCE  1-2 FLIGHT OF STAIRS  WITHOUT SHORTNESS OF BREATH    Anesthesia Alert  NO--Difficult Airway  NO--History of neck surgery or radiation  NO--Limited ROM of neck  NO--History of Malignant hyperthermia  NO--Personal or family history of Pseudocholinesterase deficiency  NO--Prior Anesthesia Complication  NO--Latex Allergy  NO--Loose teeth  NO--History of Rheumatoid Arthritis  NO--DON  NO-- BLEEDING RISK  NO--Other_____    Patient verbalized understanding of instructions and was given the opportunity to ask questions and have them answered.  As per patient, this is their complete medical and surgical history, including medications both prescribed or over the counter.    Patient denies any signs or symptoms of COVID 19 and denies contact with known positive individuals.  They have an appointment for rCOVID testing pre-procedure and acknowledge its time and place.  They were instructed to quarantine pre-procedure, practice exposure control measures, continue to self-monitor and report any concerns to their proceduralist.

## 2022-02-03 NOTE — H&P PST ADULT - MUSCULOSKELETAL
Telephone Encounter by Paula Looney MD at 05/15/18 03:38 PM     Author:  Paula Looney MD Service:  (none) Author Type:  Physician     Filed:  05/15/18 03:38 PM Encounter Date:  5/14/2018 Status:  Signed     :  Paula Looney MD (Physician)            Rx sent[BK1.1M]  Electronically Signed by:    Paula Looney MD , 5/15/2018[BK1.1T]        Revision History        User Key Date/Time User Provider Type Action    > BK1.1 05/15/18 03:38 PM Paula Looney MD Physician Sign    M - Manual, T - Template             No joint pain, swelling or deformity; no limitation of movement

## 2022-02-14 ENCOUNTER — LABORATORY RESULT (OUTPATIENT)
Age: 72
End: 2022-02-14

## 2022-02-14 PROBLEM — I10 ESSENTIAL (PRIMARY) HYPERTENSION: Chronic | Status: ACTIVE | Noted: 2022-02-03

## 2022-02-15 ENCOUNTER — APPOINTMENT (OUTPATIENT)
Dept: CARDIOLOGY | Facility: CLINIC | Age: 72
End: 2022-02-15
Payer: MEDICARE

## 2022-02-15 VITALS — RESPIRATION RATE: 18 BRPM | SYSTOLIC BLOOD PRESSURE: 120 MMHG | DIASTOLIC BLOOD PRESSURE: 70 MMHG | HEART RATE: 76 BPM

## 2022-02-15 VITALS — HEIGHT: 66 IN | WEIGHT: 188 LBS | BODY MASS INDEX: 30.22 KG/M2

## 2022-02-15 PROCEDURE — 99214 OFFICE O/P EST MOD 30 MIN: CPT

## 2022-02-15 PROCEDURE — 93000 ELECTROCARDIOGRAM COMPLETE: CPT

## 2022-02-15 NOTE — PHYSICAL EXAM
[Well Developed] : well developed [Well Nourished] : well nourished [No Acute Distress] : no acute distress [Normal Conjunctiva] : normal conjunctiva [Normal Venous Pressure] : normal venous pressure [No Carotid Bruit] : no carotid bruit [Normal S1, S2] : normal S1, S2 [No Murmur] : no murmur [No Rub] : no rub [No Gallop] : no gallop [Clear Lung Fields] : clear lung fields [Good Air Entry] : good air entry [No Respiratory Distress] : no respiratory distress  [Soft] : abdomen soft [Non Tender] : non-tender [Normal Bowel Sounds] : normal bowel sounds [No Masses/organomegaly] : no masses/organomegaly [Normal Gait] : normal gait [No Edema] : no edema [No Cyanosis] : no cyanosis [No Clubbing] : no clubbing [No Varicosities] : no varicosities [No Rash] : no rash [No Skin Lesions] : no skin lesions [Moves all extremities] : moves all extremities [No Focal Deficits] : no focal deficits [Normal Speech] : normal speech [Alert and Oriented] : alert and oriented [Normal memory] : normal memory [de-identified] : colostomy

## 2022-02-16 NOTE — ASU PATIENT PROFILE, ADULT - FALL HARM RISK - UNIVERSAL INTERVENTIONS
Bed in lowest position, wheels locked, appropriate side rails in place/Call bell, personal items and telephone in reach/Instruct patient to call for assistance before getting out of bed or chair/Non-slip footwear when patient is out of bed/[place] to call system/Physically safe environment - no spills, clutter or unnecessary equipment/Purposeful Proactive Rounding/Room/bathroom lighting operational, light cord in reach

## 2022-02-17 ENCOUNTER — RESULT REVIEW (OUTPATIENT)
Age: 72
End: 2022-02-17

## 2022-02-17 ENCOUNTER — INPATIENT (INPATIENT)
Facility: HOSPITAL | Age: 72
LOS: 2 days | Discharge: HOME HEALTH | End: 2022-02-20
Attending: COLON & RECTAL SURGERY | Admitting: COLON & RECTAL SURGERY
Payer: MEDICARE

## 2022-02-17 ENCOUNTER — APPOINTMENT (OUTPATIENT)
Dept: SURGERY | Facility: HOSPITAL | Age: 72
End: 2022-02-17

## 2022-02-17 VITALS
HEIGHT: 66 IN | SYSTOLIC BLOOD PRESSURE: 173 MMHG | HEART RATE: 98 BPM | DIASTOLIC BLOOD PRESSURE: 90 MMHG | TEMPERATURE: 98 F | OXYGEN SATURATION: 100 % | WEIGHT: 184.97 LBS

## 2022-02-17 DIAGNOSIS — C18.7 MALIGNANT NEOPLASM OF SIGMOID COLON: ICD-10-CM

## 2022-02-17 DIAGNOSIS — I10 ESSENTIAL (PRIMARY) HYPERTENSION: ICD-10-CM

## 2022-02-17 DIAGNOSIS — C50.912 MALIGNANT NEOPLASM OF UNSPECIFIED SITE OF LEFT FEMALE BREAST: ICD-10-CM

## 2022-02-17 DIAGNOSIS — Z43.3 ENCOUNTER FOR ATTENTION TO COLOSTOMY: ICD-10-CM

## 2022-02-17 DIAGNOSIS — Z90.49 ACQUIRED ABSENCE OF OTHER SPECIFIED PARTS OF DIGESTIVE TRACT: ICD-10-CM

## 2022-02-17 DIAGNOSIS — C79.89 SECONDARY MALIGNANT NEOPLASM OF OTHER SPECIFIED SITES: ICD-10-CM

## 2022-02-17 DIAGNOSIS — Z90.49 ACQUIRED ABSENCE OF OTHER SPECIFIED PARTS OF DIGESTIVE TRACT: Chronic | ICD-10-CM

## 2022-02-17 DIAGNOSIS — C50.911 MALIGNANT NEOPLASM OF UNSPECIFIED SITE OF RIGHT FEMALE BREAST: ICD-10-CM

## 2022-02-17 LAB
GLUCOSE BLDC GLUCOMTR-MCNC: 152 MG/DL — HIGH (ref 70–99)
GLUCOSE BLDC GLUCOMTR-MCNC: 213 MG/DL — HIGH (ref 70–99)

## 2022-02-17 PROCEDURE — 44620 REPAIR BOWEL OPENING: CPT | Mod: 58

## 2022-02-17 PROCEDURE — 45330 DIAGNOSTIC SIGMOIDOSCOPY: CPT | Mod: 58

## 2022-02-17 PROCEDURE — 88304 TISSUE EXAM BY PATHOLOGIST: CPT | Mod: 26

## 2022-02-17 PROCEDURE — S2900 ROBOTIC SURGICAL SYSTEM: CPT | Mod: NC

## 2022-02-17 PROCEDURE — 44213 LAP MOBIL SPLENIC FL ADD-ON: CPT | Mod: 58

## 2022-02-17 PROCEDURE — 88309 TISSUE EXAM BY PATHOLOGIST: CPT | Mod: 26

## 2022-02-17 PROCEDURE — 44204 LAPARO PARTIAL COLECTOMY: CPT | Mod: 58

## 2022-02-17 RX ORDER — AMLODIPINE BESYLATE 2.5 MG/1
5 TABLET ORAL DAILY
Refills: 0 | Status: DISCONTINUED | OUTPATIENT
Start: 2022-02-17 | End: 2022-02-20

## 2022-02-17 RX ORDER — HYDROMORPHONE HYDROCHLORIDE 2 MG/ML
0.5 INJECTION INTRAMUSCULAR; INTRAVENOUS; SUBCUTANEOUS
Refills: 0 | Status: DISCONTINUED | OUTPATIENT
Start: 2022-02-17 | End: 2022-02-17

## 2022-02-17 RX ORDER — KETOROLAC TROMETHAMINE 30 MG/ML
15 SYRINGE (ML) INJECTION EVERY 6 HOURS
Refills: 0 | Status: DISCONTINUED | OUTPATIENT
Start: 2022-02-17 | End: 2022-02-17

## 2022-02-17 RX ORDER — CEFOTETAN DISODIUM 1 G
2 VIAL (EA) INJECTION EVERY 12 HOURS
Refills: 0 | Status: COMPLETED | OUTPATIENT
Start: 2022-02-17 | End: 2022-02-18

## 2022-02-17 RX ORDER — ONDANSETRON 8 MG/1
4 TABLET, FILM COATED ORAL ONCE
Refills: 0 | Status: COMPLETED | OUTPATIENT
Start: 2022-02-17 | End: 2022-02-17

## 2022-02-17 RX ORDER — SODIUM CHLORIDE 9 MG/ML
1000 INJECTION, SOLUTION INTRAVENOUS
Refills: 0 | Status: DISCONTINUED | OUTPATIENT
Start: 2022-02-17 | End: 2022-02-18

## 2022-02-17 RX ORDER — HYDROMORPHONE HYDROCHLORIDE 2 MG/ML
0.5 INJECTION INTRAMUSCULAR; INTRAVENOUS; SUBCUTANEOUS EVERY 4 HOURS
Refills: 0 | Status: DISCONTINUED | OUTPATIENT
Start: 2022-02-17 | End: 2022-02-20

## 2022-02-17 RX ORDER — MORPHINE SULFATE 50 MG/1
2 CAPSULE, EXTENDED RELEASE ORAL
Refills: 0 | Status: DISCONTINUED | OUTPATIENT
Start: 2022-02-17 | End: 2022-02-17

## 2022-02-17 RX ORDER — SODIUM CHLORIDE 9 MG/ML
1000 INJECTION, SOLUTION INTRAVENOUS
Refills: 0 | Status: DISCONTINUED | OUTPATIENT
Start: 2022-02-17 | End: 2022-02-17

## 2022-02-17 RX ORDER — CHLORHEXIDINE GLUCONATE 213 G/1000ML
1 SOLUTION TOPICAL
Refills: 0 | Status: DISCONTINUED | OUTPATIENT
Start: 2022-02-17 | End: 2022-02-20

## 2022-02-17 RX ORDER — ENOXAPARIN SODIUM 100 MG/ML
40 INJECTION SUBCUTANEOUS EVERY 24 HOURS
Refills: 0 | Status: DISCONTINUED | OUTPATIENT
Start: 2022-02-17 | End: 2022-02-20

## 2022-02-17 RX ORDER — ACETAMINOPHEN 500 MG
650 TABLET ORAL EVERY 6 HOURS
Refills: 0 | Status: DISCONTINUED | OUTPATIENT
Start: 2022-02-17 | End: 2022-02-20

## 2022-02-17 RX ORDER — METOPROLOL TARTRATE 50 MG
50 TABLET ORAL DAILY
Refills: 0 | Status: DISCONTINUED | OUTPATIENT
Start: 2022-02-17 | End: 2022-02-20

## 2022-02-17 RX ORDER — PANTOPRAZOLE SODIUM 20 MG/1
40 TABLET, DELAYED RELEASE ORAL
Refills: 0 | Status: DISCONTINUED | OUTPATIENT
Start: 2022-02-17 | End: 2022-02-20

## 2022-02-17 RX ADMIN — ONDANSETRON 4 MILLIGRAM(S): 8 TABLET, FILM COATED ORAL at 21:30

## 2022-02-17 RX ADMIN — MORPHINE SULFATE 2 MILLIGRAM(S): 50 CAPSULE, EXTENDED RELEASE ORAL at 21:30

## 2022-02-17 NOTE — CHART NOTE - NSCHARTNOTEFT_GEN_A_CORE
PACU ANESTHESIA ADMISSION NOTE      Procedure: Colectomy, sigmoid, robot-assisted    Block, transversus abdominis plane, bilateral    Creation, revision, or reversal of loop colostomy or ileostomy      Post op diagnosis:  Colon cancer    Status post colostomy        ____  Intubated  TV:______       Rate: ______      FiO2: ______    _x___  Patent Airway    _x___  Full return of protective reflexes    _  Full recovery from anesthesia / back to baseline status    Vitals:            T:  98.5              BP :   145/74             R: 14             Sat: 97%              P: 96      Mental Status:  _x___ Awake   _____ Alert   _____ Drowsy   _____ Sedated    Nausea/Vomiting:  _x___  NO       ______Yes,   See Post - Op Orders         Pain Scale (0-10):  __0___    Treatment: __ None    _x___ See Post - Op/PCA Orders    Post - Operative Fluids:   ___ Oral   __x__ See Post - Op Orders    Plan: Discharge:   __Home       __x___Floor     _____Critical Care    _____  Other:_________________    Comments:  No anesthesia issues or complications noted.  Discharge when criteria met.

## 2022-02-17 NOTE — BRIEF OPERATIVE NOTE - NSICDXBRIEFPREOP_GEN_ALL_CORE_FT
PRE-OP DIAGNOSIS:  Colon cancer 17-Feb-2022 20:57:19  Navya De La O  Status post colostomy 17-Feb-2022 20:57:45 For large bowel obstruction Navya De La O

## 2022-02-17 NOTE — BRIEF OPERATIVE NOTE - NSICDXBRIEFPOSTOP_GEN_ALL_CORE_FT
POST-OP DIAGNOSIS:  Status post colostomy 17-Feb-2022 20:58:03 For large bowel obstruction Navya De La O  Colon cancer 17-Feb-2022 20:57:57  Navya De La O

## 2022-02-17 NOTE — PATIENT PROFILE ADULT - COMPLETE THE FOLLOWING IF THE PATIENT REFUSES THE INFLUENZA VACCINE:
Provider-UVALDO Gavin at bedside for reassessment and to discuss test results and plan of care.    Risks/benefits discussed with patient/surrogate

## 2022-02-17 NOTE — PATIENT PROFILE ADULT - FALL HARM RISK - HARM RISK INTERVENTIONS

## 2022-02-17 NOTE — BRIEF OPERATIVE NOTE - NSICDXBRIEFPROCEDURE_GEN_ALL_CORE_FT
PROCEDURES:  Colectomy, sigmoid, robot-assisted 17-Feb-2022 20:56:07  Navya De La O  Block, transversus abdominis plane, bilateral 17-Feb-2022 20:56:15  Navya De La O  Creation, revision, or reversal of loop colostomy or ileostomy 17-Feb-2022 20:56:56  Navya De La O

## 2022-02-18 ENCOUNTER — TRANSCRIPTION ENCOUNTER (OUTPATIENT)
Age: 72
End: 2022-02-18

## 2022-02-18 LAB
ANION GAP SERPL CALC-SCNC: 10 MMOL/L — SIGNIFICANT CHANGE UP (ref 7–14)
ANION GAP SERPL CALC-SCNC: 15 MMOL/L — HIGH (ref 7–14)
BASOPHILS # BLD AUTO: 0.02 K/UL — SIGNIFICANT CHANGE UP (ref 0–0.2)
BASOPHILS # BLD AUTO: 0.04 K/UL — SIGNIFICANT CHANGE UP (ref 0–0.2)
BASOPHILS NFR BLD AUTO: 0.2 % — SIGNIFICANT CHANGE UP (ref 0–1)
BASOPHILS NFR BLD AUTO: 0.4 % — SIGNIFICANT CHANGE UP (ref 0–1)
BUN SERPL-MCNC: 14 MG/DL — SIGNIFICANT CHANGE UP (ref 10–20)
BUN SERPL-MCNC: 15 MG/DL — SIGNIFICANT CHANGE UP (ref 10–20)
CALCIUM SERPL-MCNC: 8.2 MG/DL — LOW (ref 8.5–10.1)
CALCIUM SERPL-MCNC: 8.3 MG/DL — LOW (ref 8.5–10.1)
CHLORIDE SERPL-SCNC: 101 MMOL/L — SIGNIFICANT CHANGE UP (ref 98–110)
CHLORIDE SERPL-SCNC: 103 MMOL/L — SIGNIFICANT CHANGE UP (ref 98–110)
CO2 SERPL-SCNC: 26 MMOL/L — SIGNIFICANT CHANGE UP (ref 17–32)
CO2 SERPL-SCNC: 26 MMOL/L — SIGNIFICANT CHANGE UP (ref 17–32)
CREAT SERPL-MCNC: 1.1 MG/DL — SIGNIFICANT CHANGE UP (ref 0.7–1.5)
CREAT SERPL-MCNC: 1.1 MG/DL — SIGNIFICANT CHANGE UP (ref 0.7–1.5)
EOSINOPHIL # BLD AUTO: 0 K/UL — SIGNIFICANT CHANGE UP (ref 0–0.7)
EOSINOPHIL # BLD AUTO: 0.01 K/UL — SIGNIFICANT CHANGE UP (ref 0–0.7)
EOSINOPHIL NFR BLD AUTO: 0 % — SIGNIFICANT CHANGE UP (ref 0–8)
EOSINOPHIL NFR BLD AUTO: 0.1 % — SIGNIFICANT CHANGE UP (ref 0–8)
GLUCOSE BLDC GLUCOMTR-MCNC: 112 MG/DL — HIGH (ref 70–99)
GLUCOSE BLDC GLUCOMTR-MCNC: 117 MG/DL — HIGH (ref 70–99)
GLUCOSE BLDC GLUCOMTR-MCNC: 124 MG/DL — HIGH (ref 70–99)
GLUCOSE BLDC GLUCOMTR-MCNC: 140 MG/DL — HIGH (ref 70–99)
GLUCOSE BLDC GLUCOMTR-MCNC: 145 MG/DL — HIGH (ref 70–99)
GLUCOSE BLDC GLUCOMTR-MCNC: 224 MG/DL — HIGH (ref 70–99)
GLUCOSE SERPL-MCNC: 124 MG/DL — HIGH (ref 70–99)
GLUCOSE SERPL-MCNC: 148 MG/DL — HIGH (ref 70–99)
HCT VFR BLD CALC: 40 % — SIGNIFICANT CHANGE UP (ref 37–47)
HCT VFR BLD CALC: 40 % — SIGNIFICANT CHANGE UP (ref 37–47)
HGB BLD-MCNC: 13.5 G/DL — SIGNIFICANT CHANGE UP (ref 12–16)
HGB BLD-MCNC: 13.6 G/DL — SIGNIFICANT CHANGE UP (ref 12–16)
IMM GRANULOCYTES NFR BLD AUTO: 0.2 % — SIGNIFICANT CHANGE UP (ref 0.1–0.3)
IMM GRANULOCYTES NFR BLD AUTO: 0.4 % — HIGH (ref 0.1–0.3)
LYMPHOCYTES # BLD AUTO: 0.6 K/UL — LOW (ref 1.2–3.4)
LYMPHOCYTES # BLD AUTO: 0.64 K/UL — LOW (ref 1.2–3.4)
LYMPHOCYTES # BLD AUTO: 4.5 % — LOW (ref 20.5–51.1)
LYMPHOCYTES # BLD AUTO: 5.7 % — LOW (ref 20.5–51.1)
MAGNESIUM SERPL-MCNC: 1.9 MG/DL — SIGNIFICANT CHANGE UP (ref 1.8–2.4)
MAGNESIUM SERPL-MCNC: 1.9 MG/DL — SIGNIFICANT CHANGE UP (ref 1.8–2.4)
MCHC RBC-ENTMCNC: 28.3 PG — SIGNIFICANT CHANGE UP (ref 27–31)
MCHC RBC-ENTMCNC: 28.5 PG — SIGNIFICANT CHANGE UP (ref 27–31)
MCHC RBC-ENTMCNC: 33.8 G/DL — SIGNIFICANT CHANGE UP (ref 32–37)
MCHC RBC-ENTMCNC: 34 G/DL — SIGNIFICANT CHANGE UP (ref 32–37)
MCV RBC AUTO: 83.2 FL — SIGNIFICANT CHANGE UP (ref 81–99)
MCV RBC AUTO: 84.4 FL — SIGNIFICANT CHANGE UP (ref 81–99)
MONOCYTES # BLD AUTO: 0.66 K/UL — HIGH (ref 0.1–0.6)
MONOCYTES # BLD AUTO: 0.77 K/UL — HIGH (ref 0.1–0.6)
MONOCYTES NFR BLD AUTO: 5.8 % — SIGNIFICANT CHANGE UP (ref 1.7–9.3)
MONOCYTES NFR BLD AUTO: 5.9 % — SIGNIFICANT CHANGE UP (ref 1.7–9.3)
NEUTROPHILS # BLD AUTO: 11.88 K/UL — HIGH (ref 1.4–6.5)
NEUTROPHILS # BLD AUTO: 9.8 K/UL — HIGH (ref 1.4–6.5)
NEUTROPHILS NFR BLD AUTO: 87.5 % — HIGH (ref 42.2–75.2)
NEUTROPHILS NFR BLD AUTO: 89.3 % — HIGH (ref 42.2–75.2)
NRBC # BLD: 0 /100 WBCS — SIGNIFICANT CHANGE UP (ref 0–0)
NRBC # BLD: 0 /100 WBCS — SIGNIFICANT CHANGE UP (ref 0–0)
PHOSPHATE SERPL-MCNC: 2.7 MG/DL — SIGNIFICANT CHANGE UP (ref 2.1–4.9)
PHOSPHATE SERPL-MCNC: 3.7 MG/DL — SIGNIFICANT CHANGE UP (ref 2.1–4.9)
PLATELET # BLD AUTO: 218 K/UL — SIGNIFICANT CHANGE UP (ref 130–400)
PLATELET # BLD AUTO: 220 K/UL — SIGNIFICANT CHANGE UP (ref 130–400)
POTASSIUM SERPL-MCNC: 3.3 MMOL/L — LOW (ref 3.5–5)
POTASSIUM SERPL-MCNC: 3.6 MMOL/L — SIGNIFICANT CHANGE UP (ref 3.5–5)
POTASSIUM SERPL-SCNC: 3.3 MMOL/L — LOW (ref 3.5–5)
POTASSIUM SERPL-SCNC: 3.6 MMOL/L — SIGNIFICANT CHANGE UP (ref 3.5–5)
RBC # BLD: 4.74 M/UL — SIGNIFICANT CHANGE UP (ref 4.2–5.4)
RBC # BLD: 4.81 M/UL — SIGNIFICANT CHANGE UP (ref 4.2–5.4)
RBC # FLD: 14 % — SIGNIFICANT CHANGE UP (ref 11.5–14.5)
RBC # FLD: 14.3 % — SIGNIFICANT CHANGE UP (ref 11.5–14.5)
SODIUM SERPL-SCNC: 139 MMOL/L — SIGNIFICANT CHANGE UP (ref 135–146)
SODIUM SERPL-SCNC: 142 MMOL/L — SIGNIFICANT CHANGE UP (ref 135–146)
WBC # BLD: 11.19 K/UL — HIGH (ref 4.8–10.8)
WBC # BLD: 13.3 K/UL — HIGH (ref 4.8–10.8)
WBC # FLD AUTO: 11.19 K/UL — HIGH (ref 4.8–10.8)
WBC # FLD AUTO: 13.3 K/UL — HIGH (ref 4.8–10.8)

## 2022-02-18 RX ORDER — INSULIN LISPRO 100/ML
VIAL (ML) SUBCUTANEOUS AT BEDTIME
Refills: 0 | Status: DISCONTINUED | OUTPATIENT
Start: 2022-02-18 | End: 2022-02-20

## 2022-02-18 RX ORDER — DEXTROSE 50 % IN WATER 50 %
25 SYRINGE (ML) INTRAVENOUS ONCE
Refills: 0 | Status: DISCONTINUED | OUTPATIENT
Start: 2022-02-18 | End: 2022-02-20

## 2022-02-18 RX ORDER — POTASSIUM CHLORIDE 20 MEQ
20 PACKET (EA) ORAL
Refills: 0 | Status: COMPLETED | OUTPATIENT
Start: 2022-02-18 | End: 2022-02-18

## 2022-02-18 RX ORDER — DEXTROSE 50 % IN WATER 50 %
12.5 SYRINGE (ML) INTRAVENOUS ONCE
Refills: 0 | Status: DISCONTINUED | OUTPATIENT
Start: 2022-02-18 | End: 2022-02-20

## 2022-02-18 RX ORDER — SODIUM CHLORIDE 9 MG/ML
1000 INJECTION, SOLUTION INTRAVENOUS
Refills: 0 | Status: DISCONTINUED | OUTPATIENT
Start: 2022-02-18 | End: 2022-02-20

## 2022-02-18 RX ORDER — SODIUM CHLORIDE 9 MG/ML
1000 INJECTION, SOLUTION INTRAVENOUS
Refills: 0 | Status: DISCONTINUED | OUTPATIENT
Start: 2022-02-18 | End: 2022-02-19

## 2022-02-18 RX ORDER — GLUCAGON INJECTION, SOLUTION 0.5 MG/.1ML
1 INJECTION, SOLUTION SUBCUTANEOUS ONCE
Refills: 0 | Status: DISCONTINUED | OUTPATIENT
Start: 2022-02-18 | End: 2022-02-20

## 2022-02-18 RX ORDER — DEXTROSE 50 % IN WATER 50 %
15 SYRINGE (ML) INTRAVENOUS ONCE
Refills: 0 | Status: DISCONTINUED | OUTPATIENT
Start: 2022-02-18 | End: 2022-02-20

## 2022-02-18 RX ORDER — INSULIN LISPRO 100/ML
VIAL (ML) SUBCUTANEOUS
Refills: 0 | Status: DISCONTINUED | OUTPATIENT
Start: 2022-02-18 | End: 2022-02-20

## 2022-02-18 RX ADMIN — Medication 650 MILLIGRAM(S): at 06:12

## 2022-02-18 RX ADMIN — Medication 650 MILLIGRAM(S): at 11:57

## 2022-02-18 RX ADMIN — Medication 100 GRAM(S): at 17:09

## 2022-02-18 RX ADMIN — SODIUM CHLORIDE 40 MILLILITER(S): 9 INJECTION, SOLUTION INTRAVENOUS at 10:05

## 2022-02-18 RX ADMIN — Medication 20 MILLIEQUIVALENT(S): at 17:10

## 2022-02-18 RX ADMIN — Medication 100 GRAM(S): at 05:14

## 2022-02-18 RX ADMIN — Medication 650 MILLIGRAM(S): at 17:10

## 2022-02-18 RX ADMIN — Medication 20 MILLIEQUIVALENT(S): at 14:27

## 2022-02-18 RX ADMIN — ENOXAPARIN SODIUM 40 MILLIGRAM(S): 100 INJECTION SUBCUTANEOUS at 05:12

## 2022-02-18 RX ADMIN — AMLODIPINE BESYLATE 5 MILLIGRAM(S): 2.5 TABLET ORAL at 05:13

## 2022-02-18 RX ADMIN — PANTOPRAZOLE SODIUM 40 MILLIGRAM(S): 20 TABLET, DELAYED RELEASE ORAL at 05:11

## 2022-02-18 RX ADMIN — Medication 20 MILLIEQUIVALENT(S): at 11:58

## 2022-02-18 RX ADMIN — Medication 50 MILLIGRAM(S): at 05:12

## 2022-02-18 RX ADMIN — Medication 650 MILLIGRAM(S): at 05:13

## 2022-02-18 NOTE — DISCHARGE NOTE NURSING/CASE MANAGEMENT/SOCIAL WORK - PATIENT PORTAL LINK FT
You can access the FollowMyHealth Patient Portal offered by [facility] by registering at the following website: [email or web address]. By joining [facility]’s FollowMyHealth portal, you will also be able to view your health information using other applications (apps) compatible with our system.

## 2022-02-18 NOTE — CHART NOTE - NSCHARTNOTEFT_GEN_A_CORE
Post Operative Note  Patient: ABHINAV BOYKIN 71y (1950) Female   MRN: [id number]  Location: [address]  Visit: 02-17-22 Inpatient  Date: 02-18-22 @ 04:32    Procedure: Colon cancer    Status post colostomy     S/P Colectomy, sigmoid, robot-assisted    Block, transversus abdominis plane, bilateral    Creation, revision, or reversal of loop colostomy or ileostomy        Subjective:   Nausea: yes no, Vomiting: yes no, Ambulating: yes no, Flatus: yes no  Pain Assessment: Patient is complaining of *** pain that is appropriate for post-operative course.     Objective:  Vitals: T(F): 96.8 (02-18-22 @ 00:38), Max: 98.5 (02-17-22 @ 20:51)  HR: 81 (02-18-22 @ 00:38)  BP: 128/60 (02-18-22 @ 00:38) (106/67 - 173/90)  RR: 18 (02-18-22 @ 00:38)  SpO2: 97% (02-18-22 @ 00:38)  Vent Settings:     In:   02-17-22 @ 07:01  -  02-18-22 @ 04:32  --------------------------------------------------------  IN: 250 mL      IV Fluids: dextrose 5%. 1000 milliLiter(s) (50 mL/Hr) IV Continuous <Continuous>  dextrose 5%. 1000 milliLiter(s) (100 mL/Hr) IV Continuous <Continuous>  lactated ringers. 1000 milliLiter(s) (125 mL/Hr) IV Continuous <Continuous>      Out:   02-17-22 @ 07:01  -  02-18-22 @ 04:32  --------------------------------------------------------  OUT: 40 mL      EBL:     Voided Urine:   02-17-22 @ 07:01 - 02-18-22 @ 04:32  --------------------------------------------------------  OUT: 40 mL      Lees Catheter: yes no   Drains:   ALONZO:    ,   Chest Tube:      NG Tube:       Physical Examination:  General Appearance: NAD,   HEENT: EOMI, sclera non-icteric.  Heart: RRR  Lungs: CTABL  Abdomen:  Soft, midly tender, appropriate for post-op course, nondistended. No rigidity, guarding, or rebound tenderness.   MSK/Extremities: Warm & well-perfused. Peripheral pulses intact.  Skin: Warm, dry. No jaundice.   Incisions/Wounds: Dressings in place, clean, dry and intact, no signs of infection/active bleeding/drainage    Medications: [Standing]  acetaminophen     Tablet .. 650 milliGRAM(s) Oral every 6 hours  amLODIPine   Tablet 5 milliGRAM(s) Oral daily  cefoTEtan  IVPB 2 Gram(s) IV Intermittent every 12 hours  chlorhexidine 4% Liquid 1 Application(s) Topical <User Schedule>  dextrose 40% Gel 15 Gram(s) Oral once  dextrose 5%. 1000 milliLiter(s) IV Continuous <Continuous>  dextrose 5%. 1000 milliLiter(s) IV Continuous <Continuous>  dextrose 50% Injectable 25 Gram(s) IV Push once  dextrose 50% Injectable 12.5 Gram(s) IV Push once  dextrose 50% Injectable 25 Gram(s) IV Push once  enoxaparin Injectable 40 milliGRAM(s) SubCutaneous every 24 hours  glucagon  Injectable 1 milliGRAM(s) IntraMuscular once  HYDROmorphone  Injectable 0.5 milliGRAM(s) IV Push every 4 hours PRN  insulin lispro (ADMELOG) corrective regimen sliding scale   SubCutaneous three times a day before meals  insulin lispro (ADMELOG) corrective regimen sliding scale   SubCutaneous at bedtime  ketorolac   Injectable 15 milliGRAM(s) IV Push every 6 hours PRN  lactated ringers. 1000 milliLiter(s) IV Continuous <Continuous>  metoprolol succinate ER 50 milliGRAM(s) Oral daily  pantoprazole    Tablet 40 milliGRAM(s) Oral before breakfast    Medications: [PRN]  acetaminophen     Tablet .. 650 milliGRAM(s) Oral every 6 hours  amLODIPine   Tablet 5 milliGRAM(s) Oral daily  cefoTEtan  IVPB 2 Gram(s) IV Intermittent every 12 hours  chlorhexidine 4% Liquid 1 Application(s) Topical <User Schedule>  dextrose 40% Gel 15 Gram(s) Oral once  dextrose 5%. 1000 milliLiter(s) IV Continuous <Continuous>  dextrose 5%. 1000 milliLiter(s) IV Continuous <Continuous>  dextrose 50% Injectable 25 Gram(s) IV Push once  dextrose 50% Injectable 12.5 Gram(s) IV Push once  dextrose 50% Injectable 25 Gram(s) IV Push once  enoxaparin Injectable 40 milliGRAM(s) SubCutaneous every 24 hours  glucagon  Injectable 1 milliGRAM(s) IntraMuscular once  HYDROmorphone  Injectable 0.5 milliGRAM(s) IV Push every 4 hours PRN  insulin lispro (ADMELOG) corrective regimen sliding scale   SubCutaneous three times a day before meals  insulin lispro (ADMELOG) corrective regimen sliding scale   SubCutaneous at bedtime  ketorolac   Injectable 15 milliGRAM(s) IV Push every 6 hours PRN  lactated ringers. 1000 milliLiter(s) IV Continuous <Continuous>  metoprolol succinate ER 50 milliGRAM(s) Oral daily  pantoprazole    Tablet 40 milliGRAM(s) Oral before breakfast    DVT PROPHYLAXIS: enoxaparin Injectable 40 milliGRAM(s) SubCutaneous every 24 hours    GI PROPHYLAXIS: pantoprazole    Tablet 40 milliGRAM(s) Oral before breakfast    ANTICOAGULATION:   ANTIBIOTICS:  cefoTEtan  IVPB 2 Gram(s)        Labs:                  Imaging:  No post-op imaging studies    Assessment:  71yF s/p robotic sigmoid colectomy, reveseal of loop colostomy, and b/l tap bloocks    Plan:  - CLD  - Monitor vitals  - Monitor post-op labs and replete as necessary  - Monitor for bowel function  - Continue Pain Medications if necessary  - Continue Antibiotics if necessary  - Encourage ambulation as tolerated  - Monitor urine output and trial of void once Lees removed  - DVT and GI Prophylaxis  - Monitor wound and dressing for changes, redress as needed.      Date/Time: 02-18-22 @ 04:32

## 2022-02-18 NOTE — DISCHARGE NOTE NURSING/CASE MANAGEMENT/SOCIAL WORK - NSDCPEFALRISK_GEN_ALL_CORE
For information on Fall & Injury Prevention, visit: [email or web address] OR  [email or web address] OR  https://www.cdc.gov/steadi/patient.html Pfizer dose 1 and 2

## 2022-02-18 NOTE — PROGRESS NOTE ADULT - ASSESSMENT
71yF s/p robotic sigmoid colectomy, reveseal of loop colostomy, and b/l tap blocks    Plan:  - CLD  - Monitor vitals  - Monitor post-op labs and replete as necessary  - Monitor for bowel function  - Continue Pain Medications if necessary  - Encourage ambulation as tolerated  - Monitor urine output  - DVT and GI Prophylaxis  - Monitor wound and dressing for changes, redress as needed.    [employer] [id number]

## 2022-02-19 LAB
ANION GAP SERPL CALC-SCNC: 11 MMOL/L — SIGNIFICANT CHANGE UP (ref 7–14)
BASOPHILS # BLD AUTO: 0.07 K/UL — SIGNIFICANT CHANGE UP (ref 0–0.2)
BASOPHILS NFR BLD AUTO: 0.8 % — SIGNIFICANT CHANGE UP (ref 0–1)
BUN SERPL-MCNC: 11 MG/DL — SIGNIFICANT CHANGE UP (ref 10–20)
CALCIUM SERPL-MCNC: 8.3 MG/DL — LOW (ref 8.5–10.1)
CHLORIDE SERPL-SCNC: 101 MMOL/L — SIGNIFICANT CHANGE UP (ref 98–110)
CO2 SERPL-SCNC: 28 MMOL/L — SIGNIFICANT CHANGE UP (ref 17–32)
CREAT SERPL-MCNC: 0.8 MG/DL — SIGNIFICANT CHANGE UP (ref 0.7–1.5)
EOSINOPHIL # BLD AUTO: 0.11 K/UL — SIGNIFICANT CHANGE UP (ref 0–0.7)
EOSINOPHIL NFR BLD AUTO: 1.2 % — SIGNIFICANT CHANGE UP (ref 0–8)
GLUCOSE BLDC GLUCOMTR-MCNC: 113 MG/DL — HIGH (ref 70–99)
GLUCOSE BLDC GLUCOMTR-MCNC: 127 MG/DL — HIGH (ref 70–99)
GLUCOSE BLDC GLUCOMTR-MCNC: 140 MG/DL — HIGH (ref 70–99)
GLUCOSE BLDC GLUCOMTR-MCNC: 99 MG/DL — SIGNIFICANT CHANGE UP (ref 70–99)
GLUCOSE SERPL-MCNC: 122 MG/DL — HIGH (ref 70–99)
HCT VFR BLD CALC: 37.8 % — SIGNIFICANT CHANGE UP (ref 37–47)
HGB BLD-MCNC: 12.7 G/DL — SIGNIFICANT CHANGE UP (ref 12–16)
IMM GRANULOCYTES NFR BLD AUTO: 0.4 % — HIGH (ref 0.1–0.3)
LYMPHOCYTES # BLD AUTO: 0.68 K/UL — LOW (ref 1.2–3.4)
LYMPHOCYTES # BLD AUTO: 7.4 % — LOW (ref 20.5–51.1)
MAGNESIUM SERPL-MCNC: 2.4 MG/DL — SIGNIFICANT CHANGE UP (ref 1.8–2.4)
MCHC RBC-ENTMCNC: 28.5 PG — SIGNIFICANT CHANGE UP (ref 27–31)
MCHC RBC-ENTMCNC: 33.6 G/DL — SIGNIFICANT CHANGE UP (ref 32–37)
MCV RBC AUTO: 84.8 FL — SIGNIFICANT CHANGE UP (ref 81–99)
MONOCYTES # BLD AUTO: 0.52 K/UL — SIGNIFICANT CHANGE UP (ref 0.1–0.6)
MONOCYTES NFR BLD AUTO: 5.6 % — SIGNIFICANT CHANGE UP (ref 1.7–9.3)
NEUTROPHILS # BLD AUTO: 7.8 K/UL — HIGH (ref 1.4–6.5)
NEUTROPHILS NFR BLD AUTO: 84.6 % — HIGH (ref 42.2–75.2)
NRBC # BLD: 0 /100 WBCS — SIGNIFICANT CHANGE UP (ref 0–0)
PHOSPHATE SERPL-MCNC: 2.5 MG/DL — SIGNIFICANT CHANGE UP (ref 2.1–4.9)
PLATELET # BLD AUTO: 229 K/UL — SIGNIFICANT CHANGE UP (ref 130–400)
POTASSIUM SERPL-MCNC: 3.4 MMOL/L — LOW (ref 3.5–5)
POTASSIUM SERPL-SCNC: 3.4 MMOL/L — LOW (ref 3.5–5)
RBC # BLD: 4.46 M/UL — SIGNIFICANT CHANGE UP (ref 4.2–5.4)
RBC # FLD: 14.6 % — HIGH (ref 11.5–14.5)
SODIUM SERPL-SCNC: 140 MMOL/L — SIGNIFICANT CHANGE UP (ref 135–146)
WBC # BLD: 9.22 K/UL — SIGNIFICANT CHANGE UP (ref 4.8–10.8)
WBC # FLD AUTO: 9.22 K/UL — SIGNIFICANT CHANGE UP (ref 4.8–10.8)

## 2022-02-19 PROCEDURE — 99232 SBSQ HOSP IP/OBS MODERATE 35: CPT

## 2022-02-19 RX ORDER — MAGNESIUM SULFATE 500 MG/ML
2 VIAL (ML) INJECTION ONCE
Refills: 0 | Status: COMPLETED | OUTPATIENT
Start: 2022-02-19 | End: 2022-02-19

## 2022-02-19 RX ORDER — POTASSIUM PHOSPHATE, MONOBASIC POTASSIUM PHOSPHATE, DIBASIC 236; 224 MG/ML; MG/ML
15 INJECTION, SOLUTION INTRAVENOUS ONCE
Refills: 0 | Status: COMPLETED | OUTPATIENT
Start: 2022-02-19 | End: 2022-02-19

## 2022-02-19 RX ADMIN — Medication 650 MILLIGRAM(S): at 11:41

## 2022-02-19 RX ADMIN — POTASSIUM PHOSPHATE, MONOBASIC POTASSIUM PHOSPHATE, DIBASIC 62.5 MILLIMOLE(S): 236; 224 INJECTION, SOLUTION INTRAVENOUS at 02:25

## 2022-02-19 RX ADMIN — Medication 650 MILLIGRAM(S): at 05:14

## 2022-02-19 RX ADMIN — Medication 50 MILLIGRAM(S): at 05:14

## 2022-02-19 RX ADMIN — Medication 650 MILLIGRAM(S): at 17:14

## 2022-02-19 RX ADMIN — Medication 650 MILLIGRAM(S): at 05:36

## 2022-02-19 RX ADMIN — Medication 650 MILLIGRAM(S): at 23:18

## 2022-02-19 RX ADMIN — Medication 650 MILLIGRAM(S): at 12:10

## 2022-02-19 RX ADMIN — AMLODIPINE BESYLATE 5 MILLIGRAM(S): 2.5 TABLET ORAL at 05:14

## 2022-02-19 RX ADMIN — Medication 25 GRAM(S): at 02:26

## 2022-02-19 RX ADMIN — ENOXAPARIN SODIUM 40 MILLIGRAM(S): 100 INJECTION SUBCUTANEOUS at 05:15

## 2022-02-19 RX ADMIN — PANTOPRAZOLE SODIUM 40 MILLIGRAM(S): 20 TABLET, DELAYED RELEASE ORAL at 05:14

## 2022-02-19 RX ADMIN — CHLORHEXIDINE GLUCONATE 1 APPLICATION(S): 213 SOLUTION TOPICAL at 07:17

## 2022-02-19 NOTE — PROGRESS NOTE ADULT - ASSESSMENT
71yF s/p robotic sigmoid colectomy, reveseal of loop colostomy, and b/l tap blocks    Plan:  - CLD  -Fluids at 40  -Passed TOV  -Daily dressing changes wet>dry with kerlix  -Trend WBC 11 from 13  -Encourage ambulation  -Pain control  -DVT PPX  -GI PPX    [employer] Team [id number]

## 2022-02-20 ENCOUNTER — TRANSCRIPTION ENCOUNTER (OUTPATIENT)
Age: 72
End: 2022-02-20

## 2022-02-20 VITALS
RESPIRATION RATE: 19 BRPM | HEART RATE: 87 BPM | TEMPERATURE: 98 F | OXYGEN SATURATION: 96 % | DIASTOLIC BLOOD PRESSURE: 80 MMHG | SYSTOLIC BLOOD PRESSURE: 145 MMHG

## 2022-02-20 LAB — GLUCOSE BLDC GLUCOMTR-MCNC: 110 MG/DL — HIGH (ref 70–99)

## 2022-02-20 PROCEDURE — 99232 SBSQ HOSP IP/OBS MODERATE 35: CPT

## 2022-02-20 RX ORDER — IBUPROFEN 200 MG
2 TABLET ORAL
Qty: 30 | Refills: 0
Start: 2022-02-20 | End: 2022-02-24

## 2022-02-20 RX ORDER — ACETAMINOPHEN 500 MG
2 TABLET ORAL
Qty: 40 | Refills: 0
Start: 2022-02-20 | End: 2022-02-24

## 2022-02-20 RX ORDER — POTASSIUM CHLORIDE 20 MEQ
20 PACKET (EA) ORAL
Refills: 0 | Status: COMPLETED | OUTPATIENT
Start: 2022-02-20 | End: 2022-02-20

## 2022-02-20 RX ADMIN — ENOXAPARIN SODIUM 40 MILLIGRAM(S): 100 INJECTION SUBCUTANEOUS at 05:59

## 2022-02-20 RX ADMIN — CHLORHEXIDINE GLUCONATE 1 APPLICATION(S): 213 SOLUTION TOPICAL at 08:00

## 2022-02-20 RX ADMIN — Medication 50 MILLIEQUIVALENT(S): at 04:53

## 2022-02-20 RX ADMIN — Medication 650 MILLIGRAM(S): at 05:59

## 2022-02-20 RX ADMIN — Medication 650 MILLIGRAM(S): at 11:31

## 2022-02-20 RX ADMIN — Medication 50 MILLIGRAM(S): at 05:58

## 2022-02-20 RX ADMIN — Medication 50 MILLIEQUIVALENT(S): at 01:58

## 2022-02-20 RX ADMIN — PANTOPRAZOLE SODIUM 40 MILLIGRAM(S): 20 TABLET, DELAYED RELEASE ORAL at 05:58

## 2022-02-20 RX ADMIN — AMLODIPINE BESYLATE 5 MILLIGRAM(S): 2.5 TABLET ORAL at 05:58

## 2022-02-20 NOTE — DISCHARGE NOTE PROVIDER - CARE PROVIDER_API CALL
Roland Fritz)  ColonRectal Surgery; Surgery  [address] [facility], [address]rd [place] [address]  Phone: [phone]  Fax: [phone]  Follow Up Time: 1 week

## 2022-02-20 NOTE — PROGRESS NOTE ADULT - ATTENDING COMMENTS
Patient is a 71F POD 1 s/p robotic sigmoidectomy and closure of loop colostomy for obstructing sigmoid colon adenocarcinoma    Patient seen and examined.  No acute events over night.  Patient tolerating clear liquids.  Denies nausea vomiting or bowel movement.  Is passing flatus    Abdomen - soft, non distended, appropriately tender.  Wounds with dermabond in place.  Colostomy dressing to be changed  passed trial of voiding after bland removal     Vitals and labs reviewed    Plan:  - CLD - advance to full liquid  - tylenol, toradol, dilaudid for pain  - PT consult  - f/u bowel function
Patient is a 71F POD 1 s/p robotic sigmoidectomy and closure of loop colostomy for obstructing sigmoid colon adenocarcinoma    Patient seen and examined.  No acute events over night.  Patient tolerating clear liquids.  Denies nausea vomiting or bowel movement.  Is passing flatus and having bowel movement.    Abdomen - soft, non distended, appropriately tender.  Wounds with dermabond in place.  Colostomy dressing to be changed  passed trial of voiding after bland removal   did not take full liquid    Vitals and labs reviewed    Plan:  - CLD - advance to low residue diet  - tylenol, toradol, dilaudid for pain  - PT consult  - f/u bowel function
Patient is a 71F POD 1 s/p robotic sigmoidectomy and closure of loop colostomy for obstructing sigmoid colon adenocarcinoma    Patient seen and examined.  No acute events over night.  Patient tolerating clear liquids.  Denies nausea vomiting or bowel function    Abdomen - soft, non distended, appropriately tender.  Wounds with dermabond in place.  Colostomy dressing with strikethrough.      Vitals and labs reviewed    Plan:  - CLD  - mIVF @40mL  - tylenol, toradol, dilaudid for pain  - 24 hours cefotetan  - DC bland  - TOV  - PT consult  - f/u bowel function

## 2022-02-20 NOTE — DISCHARGE NOTE PROVIDER - NSDCFUSCHEDAPPT_GEN_ALL_CORE_FT
ABHINAV BOYKIN ; 03/07/2022 ; [facility] [place] [address] ABHINAV Romero ; 04/20/2022 ; [facility] [address]

## 2022-02-20 NOTE — DISCHARGE NOTE PROVIDER - NSDCCPCAREPLAN_GEN_ALL_CORE_FT
PRINCIPAL DISCHARGE DIAGNOSIS  Diagnosis: S/P colostomy takedown  Assessment and Plan of Treatment: You are being discharged home today. Please take Tylenol and Motrin for pain as needed. Prescriptions were sent to your pharmacy. Can shower. Continue wound care with VNS. Call Dr. Fritz's office and schedule follow up appointment in 1 week. If you have fever (>100.4 F), severe pain, nausea, vomiting, redness or discharge from your wound, call Dr. Fritz's office during regular business hours or go to the nearest emergency room for evaluation.

## 2022-02-20 NOTE — DISCHARGE NOTE PROVIDER - NSDCMRMEDTOKEN_GEN_ALL_CORE_FT
acetaminophen 325 mg oral tablet: 2 tab(s) orally every 6 hours  amLODIPine 5 mg oral tablet: 1 tab(s) orally once a day MDD:1 tablet  chlorthalidone 25 mg oral tablet: 0.5 tab(s) orally once a day MDD:1/2 tablet  metoprolol succinate 50 mg oral tablet, extended release: 1 tab(s) orally once a day  Motrin  mg oral capsule: 2 cap(s) orally every 8 hours   Tylenol 325 mg oral capsule: 2 cap(s) orally every 6 hours

## 2022-02-20 NOTE — DISCHARGE NOTE PROVIDER - HOSPITAL COURSE
02/17/22 PT s/p Colectomy, robot-assisted, reversal of loop colostomy, block, transversus abdominis plane, bilateral. Postoperative course uneventful.  02/18/22 Passed TOV, tolerating CLD, no gas or BM, no nausea or vomiting. Mild left arm swelling from infiltrated peripheral IV, no numbness/tingling/weakness.  02/19/22 Pt seen and examined at bedside. Pt advanced to full liquid diet and IVL. Pt did not eat a lot after diet being advanced due to not liking the food. +Gas, -BM. No acute overnight events. Afebrile  02/20/22 PT has no complains. Tolerated regular, low residue diet. Plan for discharge.

## 2022-02-20 NOTE — PROGRESS NOTE ADULT - SUBJECTIVE AND OBJECTIVE BOX
COLORECTAL SURGERY PROGRESS NOTE    Patient: ABHINAV BOYKIN , 71y ([birth year]Female   MRN: [id number]  Location: [address]  Visit: 02-17-22 Inpatient  Date: 02-18-22 @ 08:29    Hospital Day #: 2  Post-Op Day #: 1    Procedure/Dx/Injuries:    S/P Colectomy, sigmoid, robot-assisted    Block, transversus abdominis plane, bilateral    Creation, revision, or reversal of loop colostomy or ileostomy      Events of past 24 hours: No acute events    PAST MEDICAL & SURGICAL HISTORY:  HTN (hypertension)    Breast cancer  recently dx 2021 no tx presently    Cancer, colon  dx 2021 at the same time she was dx with breast ca    History of colon resection        Vitals:   T(F): 97.5 (02-18-22 @ 05:08), Max: 98.5 (02-17-22 @ 20:51)  HR: 80 (02-18-22 @ 05:08)  BP: 117/58 (02-18-22 @ 05:08)  RR: 18 (02-18-22 @ 05:08)  SpO2: 97% (02-18-22 @ 05:08)      Diet, Clear Liquid      Fluids: lactated ringers.: Solution, 1000 milliLiter(s) infuse at 125 mL/Hr      I & O's:    02-17-22 @ 07:01  -  02-18-22 @ 07:00  --------------------------------------------------------  IN:    Lactated Ringers: 1000 mL  Total IN: 1000 mL    OUT:    Indwelling Catheter - Urethral (mL): 640 mL  Total OUT: 640 mL    Total NET: 360 mL        Bowel Movement: : [] YES [x] NO  Flatus: : [] YES [x] NO      Physical Examination:  General Appearance: NAD,   HEENT: EOMI, sclera non-icteric.  Heart: RRR  Lungs: CTABL  Abdomen:  Soft, midly tender, appropriate for post-op course, nondistended. No rigidity, guarding, or rebound tenderness.   MSK/Extremities: Warm & well-perfused. Peripheral pulses intact.  Skin: Warm, dry. No jaundice.   Incisions/Wounds: Dressings in place, clean, dry and intact, no signs of infection/active bleeding/drainage    MEDICATIONS  (STANDING):  acetaminophen     Tablet .. 650 milliGRAM(s) Oral every 6 hours  amLODIPine   Tablet 5 milliGRAM(s) Oral daily  cefoTEtan  IVPB 2 Gram(s) IV Intermittent every 12 hours  chlorhexidine 4% Liquid 1 Application(s) Topical <User Schedule>  dextrose 40% Gel 15 Gram(s) Oral once  dextrose 5%. 1000 milliLiter(s) (50 mL/Hr) IV Continuous <Continuous>  dextrose 5%. 1000 milliLiter(s) (100 mL/Hr) IV Continuous <Continuous>  dextrose 50% Injectable 25 Gram(s) IV Push once  dextrose 50% Injectable 12.5 Gram(s) IV Push once  dextrose 50% Injectable 25 Gram(s) IV Push once  enoxaparin Injectable 40 milliGRAM(s) SubCutaneous every 24 hours  glucagon  Injectable 1 milliGRAM(s) IntraMuscular once  insulin lispro (ADMELOG) corrective regimen sliding scale   SubCutaneous three times a day before meals  insulin lispro (ADMELOG) corrective regimen sliding scale   SubCutaneous at bedtime  lactated ringers. 1000 milliLiter(s) (125 mL/Hr) IV Continuous <Continuous>  metoprolol succinate ER 50 milliGRAM(s) Oral daily  pantoprazole    Tablet 40 milliGRAM(s) Oral before breakfast    MEDICATIONS  (PRN):  HYDROmorphone  Injectable 0.5 milliGRAM(s) IV Push every 4 hours PRN Severe Pain (7 - 10)  ketorolac   Injectable 15 milliGRAM(s) IV Push every 6 hours PRN Moderate Pain (4 - 6)      DVT PROPHYLAXIS: enoxaparin Injectable 40 milliGRAM(s) SubCutaneous every 24 hours    GI PROPHYLAXIS: pantoprazole    Tablet 40 milliGRAM(s) Oral before breakfast    ANTICOAGULATION:   ANTIBIOTICS:  cefoTEtan  IVPB 2 Gram(s)      LAB/STUDIES:  Labs:  CAPILLARY BLOOD GLUCOSE      POCT Blood Glucose.: 145 mg/dL (18 Feb 2022 07:41)  POCT Blood Glucose.: 224 mg/dL (18 Feb 2022 00:12)  POCT Blood Glucose.: 213 mg/dL (17 Feb 2022 21:54)  POCT Blood Glucose.: 152 mg/dL (17 Feb 2022 12:14)      
GENERAL SURGERY PROGRESS NOTE    Patient: ABHINAV BOYKIN , 71y ([birth year])Female   MRN: [id number]  Location: [address]  Visit: 02-17-22 Inpatient  Date: 02-20-22 @ 03:13    Hospital Day #:4  Post-Op Day #:3    Procedure/Dx/Injuries: s/p robotic sigmoid colectomy, reversal of loop colostomy, b/l tap blocks    Events of past 24 hours: Pt seen and examined at bedside. Pt advanced to full liquid diet and IVL. Pt did not eat a lot after diet being advanced due to not liking the food. +Gas, -BM. No acute overnight events. Afebrile     PAST MEDICAL & SURGICAL HISTORY:  HTN (hypertension)  Breast cancer  recently dx 2021 no tx presently  Cancer, colon  dx 2021 at the same time she was dx with breast ca  History of colon resection    Vitals:   T(F): 97.9 (02-20-22 @ 00:57), Max: 99.1 (02-19-22 @ 09:30)  HR: 87 (02-20-22 @ 00:57)  BP: 148/70 (02-20-22 @ 00:57)  RR: 18 (02-20-22 @ 00:57)  SpO2: 95% (02-20-22 @ 00:57)    Diet, Full Liquid    02-18-22 @ 07:01  -  02-19-22 @ 07:00  --------------------------------------------------------  IN:    dextrose 5% + sodium chloride 0.45%: 440 mL    Oral Fluid: 420 mL  Total IN: 860 mL  OUT:    Indwelling Catheter - Urethral (mL): 820 mL    Voided (mL): 1050 mL  Total OUT: 1870 mL  Total NET: -1010 mL    PHYSICAL EXAM:  General: NAD, AAOx3, calm and cooperative  HEENT: NCAT, EOMI  Cardiac: S1, S2  Respiratory: normal respiratory effort, breath sounds equal BL  Abdomen: Soft, non-distended, non-tender, no rebound, no guarding; packing and dressing in place  Skin: Warm/dry, normal color, no jaundice    MEDICATIONS  (STANDING):  acetaminophen     Tablet .. 650 milliGRAM(s) Oral every 6 hours  amLODIPine   Tablet 5 milliGRAM(s) Oral daily  chlorhexidine 4% Liquid 1 Application(s) Topical <User Schedule>  dextrose 40% Gel 15 Gram(s) Oral once  dextrose 5%. 1000 milliLiter(s) (50 mL/Hr) IV Continuous <Continuous>  dextrose 5%. 1000 milliLiter(s) (100 mL/Hr) IV Continuous <Continuous>  dextrose 50% Injectable 25 Gram(s) IV Push once  dextrose 50% Injectable 12.5 Gram(s) IV Push once  dextrose 50% Injectable 25 Gram(s) IV Push once  enoxaparin Injectable 40 milliGRAM(s) SubCutaneous every 24 hours  glucagon  Injectable 1 milliGRAM(s) IntraMuscular once  insulin lispro (ADMELOG) corrective regimen sliding scale   SubCutaneous three times a day before meals  insulin lispro (ADMELOG) corrective regimen sliding scale   SubCutaneous at bedtime  metoprolol succinate ER 50 milliGRAM(s) Oral daily  pantoprazole    Tablet 40 milliGRAM(s) Oral before breakfast  potassium chloride  20 mEq/100 mL IVPB 20 milliEquivalent(s) IV Intermittent every 2 hours    MEDICATIONS  (PRN):  HYDROmorphone  Injectable 0.5 milliGRAM(s) IV Push every 4 hours PRN Severe Pain (7 - 10)  ketorolac   Injectable 15 milliGRAM(s) IV Push every 6 hours PRN Moderate Pain (4 - 6)    DVT PROPHYLAXIS: enoxaparin Injectable 40 milliGRAM(s) SubCutaneous every 24 hours  GI PROPHYLAXIS: pantoprazole    Tablet 40 milliGRAM(s) Oral before breakfast    LAB/STUDIES:  Labs:  CAPILLARY BLOOD GLUCOSE  POCT Blood Glucose.: 127 mg/dL (19 Feb 2022 21:10)  POCT Blood Glucose.: 113 mg/dL (19 Feb 2022 16:51)  POCT Blood Glucose.: 99 mg/dL (19 Feb 2022 11:31)  POCT Blood Glucose.: 140 mg/dL (19 Feb 2022 07:13)                        12.7   9.22  )-----------( 229      ( 19 Feb 2022 20:00 )             37.8       Auto Neutrophil %: 84.6 % (02-19-22 @ 20:00)  Auto Immature Granulocyte %: 0.4 % (02-19-22 @ 20:00)    02-19    140  |  101  |  11  ----------------------------<  122<H>  3.4<L>   |  28  |  0.8    Calcium, Total Serum: 8.3 mg/dL (02-19-22 @ 20:00)    IMAGING:  No new imaging past 24hrs        
GENERAL SURGERY PROGRESS NOTE    Patient: ABHINAV BOYKIN , 71y ([birth year])Female   MRN: [id number]  Location: [address]  Visit: 02-17-22 Inpatient  Date: 02-19-22 @ 02:17    Hospital Day #: 3  Post-Op Day #:2    Procedure/Dx/Injuries: s/p robotic sigmoid colectomy, reversal of loop colostomy, b/l tap blocks    Events of past 24 hours: Passed TOV, tolerating CLD, no gas or BM, no nausea or vomiting. Left arm swelling likely from blown IV, no numbness/tingling/weakness.    PAST MEDICAL & SURGICAL HISTORY:  HTN (hypertension)    Breast cancer  recently dx 2021 no tx presently    Cancer, colon  dx 2021 at the same time she was dx with breast ca    History of colon resection        Vitals:   T(F): 98.8 (02-19-22 @ 00:24), Max: 98.8 (02-19-22 @ 00:24)  HR: 92 (02-19-22 @ 00:24)  BP: 140/65 (02-19-22 @ 00:24)  RR: 18 (02-19-22 @ 00:24)  SpO2: 97% (02-19-22 @ 00:24)      Diet, Clear Liquid      Fluids: dextrose 5% + sodium chloride 0.45%.: Solution, 1000 milliLiter(s) infuse at 40 mL/Hr  Provider's Contact #: [phone]      I & O's:    02-17-22 @ 07:01  -  02-18-22 @ 07:00  --------------------------------------------------------  IN:    Lactated Ringers: 1000 mL  Total IN: 1000 mL    OUT:    Indwelling Catheter - Urethral (mL): 640 mL  Total OUT: 640 mL    Total NET: 360 mL        PHYSICAL EXAM:  General: NAD, AAOx3, calm and cooperative  HEENT: NCAT, LEOBARDO, EOMI, Trachea ML, Neck supple  Cardiac: RRR S1, S2, no Murmurs, rubs or gallops  Respiratory: CTAB, normal respiratory effort, breath sounds equal BL, no wheeze, rhonchi or crackles  Abdomen: Soft, non-distended, non-tender, packing in place  Extremities: LUE palp radial and ulnar pulses, edematous    MEDICATIONS  (STANDING):  acetaminophen     Tablet .. 650 milliGRAM(s) Oral every 6 hours  amLODIPine   Tablet 5 milliGRAM(s) Oral daily  chlorhexidine 4% Liquid 1 Application(s) Topical <User Schedule>  dextrose 40% Gel 15 Gram(s) Oral once  dextrose 5% + sodium chloride 0.45%. 1000 milliLiter(s) (40 mL/Hr) IV Continuous <Continuous>  dextrose 5%. 1000 milliLiter(s) (100 mL/Hr) IV Continuous <Continuous>  dextrose 5%. 1000 milliLiter(s) (50 mL/Hr) IV Continuous <Continuous>  dextrose 50% Injectable 25 Gram(s) IV Push once  dextrose 50% Injectable 12.5 Gram(s) IV Push once  dextrose 50% Injectable 25 Gram(s) IV Push once  enoxaparin Injectable 40 milliGRAM(s) SubCutaneous every 24 hours  glucagon  Injectable 1 milliGRAM(s) IntraMuscular once  insulin lispro (ADMELOG) corrective regimen sliding scale   SubCutaneous three times a day before meals  insulin lispro (ADMELOG) corrective regimen sliding scale   SubCutaneous at bedtime  magnesium sulfate  IVPB 2 Gram(s) IV Intermittent once  metoprolol succinate ER 50 milliGRAM(s) Oral daily  pantoprazole    Tablet 40 milliGRAM(s) Oral before breakfast  potassium phosphate IVPB 15 milliMole(s) IV Intermittent once    MEDICATIONS  (PRN):  HYDROmorphone  Injectable 0.5 milliGRAM(s) IV Push every 4 hours PRN Severe Pain (7 - 10)  ketorolac   Injectable 15 milliGRAM(s) IV Push every 6 hours PRN Moderate Pain (4 - 6)      DVT PROPHYLAXIS: enoxaparin Injectable 40 milliGRAM(s) SubCutaneous every 24 hours    GI PROPHYLAXIS: pantoprazole    Tablet 40 milliGRAM(s) Oral before breakfast    ANTICOAGULATION:   ANTIBIOTICS:            LAB/STUDIES:  Labs:  CAPILLARY BLOOD GLUCOSE      POCT Blood Glucose.: 117 mg/dL (18 Feb 2022 21:38)  POCT Blood Glucose.: 124 mg/dL (18 Feb 2022 16:38)  POCT Blood Glucose.: 112 mg/dL (18 Feb 2022 11:10)  POCT Blood Glucose.: 145 mg/dL (18 Feb 2022 07:41)                          13.5   11.19 )-----------( 220      ( 18 Feb 2022 20:00 )             40.0       Auto Neutrophil %: 87.5 % (02-18-22 @ 20:00)  Auto Immature Granulocyte %: 0.4 % (02-18-22 @ 20:00)  Auto Neutrophil %: 89.3 % (02-18-22 @ 08:13)  Auto Immature Granulocyte %: 0.2 % (02-18-22 @ 08:13)    02-18    139  |  103  |  14  ----------------------------<  124<H>  3.6   |  26  |  1.1      Calcium, Total Serum: 8.2 mg/dL (02-18-22 @ 20:00)      LFTs:         Coags:

## 2022-02-20 NOTE — PROGRESS NOTE ADULT - ASSESSMENT
ASSESSMENT:  71yF w/ PMHx of HTN. breast CA, LBO s/p loop transverse colostomy (11/2021) is now s/p robotic sigmoid colectomy, reversal of loop colostomy, and b/l tap blocks    Plan:  -FLD; will reassess today for possible advancement   -IVL  -Daily dressing changes wet>dry with kerlix  -WBC downtrending  -Encourage ambulation as tolerated   -Pain control  -DVT/GI ppx    BLUE TEAM SPECTRA: [id number]

## 2022-02-24 LAB — SURGICAL PATHOLOGY STUDY: SIGNIFICANT CHANGE UP

## 2022-03-06 NOTE — CDI QUERY NOTE - NSCDIOTHERTXTBX_GEN_ALL_CORE_HH
CLINICAL INDICATORS    2/17 Brief Operative Note: … Robotic sigmoidectomy for sigmoid colon cancer. Primary anastomosis performed with 28 EEA stapler. Two intact donuts. Anastomosis intact confirmed via colonoscopy, negative leak test. Prior loop colostomy reversed with side to side anastomosis …    2/17 Operative Report: … The abdomen was explored, and no evidence of metastatic disease was found on the peritoneal surface, liver, or omentum …     2/18 Pathology Report: … TUMOR EXTENT: Invades through muscularis propria into pericolorectal tissue … 3.  The specimen is received in formalin, labeled "sigmoid colon" and consists of an unoriented segment of colon ... The attached pericolic fat measures 27 x 14 x 3 cm … PATHOLOGIC STAGE CLASSIFICATION: pT Category: pT3: Tumor invades through the muscularis propria into pericolorectal tissues …    2/24 : … 3. Sigmoid colon, robotic sigmoidectomy: - Invasive adenocarcinoma (6 cm in greatest dimension), moderately differentiated, pT3, pN0. - Resection margins are negative for dysplasia and carcinoma. - Twenty two lymph nodes, negative for metastatic carcinoma (0/22) …    Based on your professional judgment and the clinical indicators, please clarify if the pathology report of tumor invasion through the muscularis propria into pericolorectal tissues can be further specified as:    •  Tumor invasion through the muscularis propria into pericolorectal fat tissues  •  Other (please specify):  •  Clinically unable to further specify pathology report of tumor invasion through the muscularis propria into pericolorectal tissues    Thank you,  Adamaris Perez RN, CCS, CCDS  [phone]

## 2022-03-07 ENCOUNTER — APPOINTMENT (OUTPATIENT)
Dept: SURGERY | Facility: CLINIC | Age: 72
End: 2022-03-07
Payer: MEDICARE

## 2022-03-07 ENCOUNTER — NON-APPOINTMENT (OUTPATIENT)
Age: 72
End: 2022-03-07

## 2022-03-07 VITALS
SYSTOLIC BLOOD PRESSURE: 161 MMHG | HEIGHT: 66 IN | HEART RATE: 101 BPM | BODY MASS INDEX: 28.28 KG/M2 | DIASTOLIC BLOOD PRESSURE: 81 MMHG | WEIGHT: 176 LBS | OXYGEN SATURATION: 98 % | TEMPERATURE: 97.3 F

## 2022-03-07 PROCEDURE — 99024 POSTOP FOLLOW-UP VISIT: CPT

## 2022-03-07 NOTE — PHYSICAL EXAM
[Abdomen Masses] : No abdominal masses [Abdomen Tenderness] : ~T No ~M abdominal tenderness [No HSM] : no hepatosplenomegaly [Respiratory Effort] : normal respiratory effort [Normal Rate and Rhythm] : normal rate and rhythm [de-identified] : external examination shows healing incisions.  RUQ stoma site healing well with good granulation tissue [de-identified] : awake, alert and in no acute distress

## 2022-03-07 NOTE — HISTORY OF PRESENT ILLNESS
[FreeTextEntry1] : Patient is a 71F with PMH of right breast mass biopsy proven ductal carcinoma with mucinous features,  s/p transverse loop colostomy creation for LBO secondary to obstructing sigmoid adenocarcinoma no presents s/p robotic sigmoidectomy and colostomy closure.  Pathology showed T3N0 (0/22LN) moderately differentiated adenocarcinoma with LVI. She is tolerating a diet and having daily BM.  She has mild constipation. Patient states she feels well.  She is tolerating a diet and her stoma is working well. Patient denies fevers, chills, nausea, vomiting, abdominal pain, constipation, diarrhea, blood in his stool or unexpected weight loss.  Patient denies a family history of colon cancer rectal cancer or inflammatory bowel disease.

## 2022-03-07 NOTE — CONSULT LETTER
[Dear  ___] : Dear  [unfilled], [Referral Letter:] : I am referring [unfilled] to you for further evaluation.  My most recent evaluation follows. [Please see my note below.] : Please see my note below. [Referral Closing:] : Thank you very much for seeing this patient.  If you have any questions, please do not hesitate to contact me. [FreeTextEntry3] : Sincerely,\par \par Roland Fritz MD, Colon and Rectal Surgery\par \par [employer] [facility]\par [address] [place] [address]\par [facility], 3rd Floor\par [place], [place] [address]\par Tel [phone] ext [phone]\par Fax [phone]\par  [DrLexis  ___] : Dr. SALES

## 2022-03-07 NOTE — ASSESSMENT
[FreeTextEntry1] : 71F with concurrent breast and obstructing colon cancer s/p diverting loop colostomy now s/p robotic sigmoidectomy and colostomy closure with T3N0 cancer with LVI\par \par Patient is healing well. She will continue with wet to dry dressing changes.  She can return to a regular diet.  We discussed about the LVI and obstruction and she may benefit from chemotherapy.  She will see Dr. Kay.  She will return in 1 month.

## 2022-03-08 NOTE — CDI QUERY NOTE - [id number]_GEN_ALL_CORE_HH
Clinical Indicators:  2/17 Surgical pathology report:                                                                            PROCEDURE: Robotic sigmoidectomy     TUMOR SITE: Sigmoid colon     HISTOLOGIC TYPE: Adenocarcinoma     HISTOLOGIC GRADE: G2: Moderately differentiated     TUMOR SIZE: Greatest dimension: 6 cm     Additional dimensions: 3 x 1.5 cm     MULTIPLE PRIMARY SITES: Not applicable     TUMOR EXTENT: Invades through muscularis propria into pericolorectal tissue     MACROSCOPIC TUMOR PERFORATION: Not identified     LYMPHOVASCULAR INVASION: Present     TREATMENT EFFECT: No known presurgical therapy     MARGINS STATUS FOR INVASIVE CARCINOMA: All margins negative for invasive carcinoma     REGIONAL LYMPH NODES: All regional lymph nodes negative for tumor     Number OF  LYMPH NODES  WITH TUMOR: 0     Number OF  LYMPH NODES EXAMINED: 22     TUMOR DEPOSITS: Not identified     DISTANT METASTASIS: Not applicable     PATHOLOGIC STAGE CLASSIFICATION     pT Category: pT3: Tumor invades through the muscularis propria into pericolorectal tissues     pN Category: pN0: No regional lymph node metastasis    Query:  Do you agree with the pathology report specifying the "LYMPHOVASCULAR INVASION: Present"?  • Yes, I concur with the pathology report. Patient has lymphovascular invasion.  • No, disagree with pathology findings.  • Other diagnosis (please clarify)  • Clinically unable to determine.

## 2022-03-15 ENCOUNTER — APPOINTMENT (OUTPATIENT)
Dept: HEMATOLOGY ONCOLOGY | Facility: CLINIC | Age: 72
End: 2022-03-15
Payer: MEDICARE

## 2022-03-15 VITALS
TEMPERATURE: 98.2 F | RESPIRATION RATE: 18 BRPM | HEART RATE: 101 BPM | OXYGEN SATURATION: 98 % | WEIGHT: 180 LBS | HEIGHT: 66 IN | SYSTOLIC BLOOD PRESSURE: 160 MMHG | DIASTOLIC BLOOD PRESSURE: 87 MMHG | BODY MASS INDEX: 28.93 KG/M2

## 2022-03-15 PROCEDURE — 99205 OFFICE O/P NEW HI 60 MIN: CPT

## 2022-03-16 ENCOUNTER — NON-APPOINTMENT (OUTPATIENT)
Age: 72
End: 2022-03-16

## 2022-03-20 NOTE — HISTORY OF PRESENT ILLNESS
[de-identified] : 70 yo female was referred by Dr. Quinonez for consultation of breast cancer. Jesica initially presented to hospital for constipation and abdominal pain. CT a/p on 11/24/21 showed  large bowel obstruction secondary to a likely malignant stricture in the proximal sigmoid colon, small volume ascites and lobulated, partially visualized right breast mass and left breast nodule. Physical exam correlation and correlation with dedicated breast imaging was recommended as malignancy is of concern.\par \par On 11/26/21, CT chest showed right and left breast masses. The right breast mass measured 6.5 x 3.8 x 7.0 cm and contained small calcifications. The left breast mass measured 3.1 x 2.9 x 2.6 cm.\par \par On 11/29/21, colonoscopy revealed sigmoid colon mass and and biopsy showed invasive adenocarcinoma, moderately differentiated. MMR proteins were all expressed. 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 \par \par On 12/21/21, b/l breast dx mammo and US were performed which showed 10 cm mass at the 11 to 1:00 position 3 to 4 cm from the nipple corresponding to the biopsy-proven right breast carcinoma.  In the right axilla, at the 11:00 position 13 cm from the nipple, there is an abnormal lymph node measuring 2.3 x 1.3 x 0.9 cm. Ultrasound-guided biopsy is recommended. In the left breast, there are scattered similar appearing subcentimeter circumscribed masses seen throughout the left breast. These correspond to mammographically demonstrated masses:\par At the 3:00 position 6 cm from the nipple, there is a mass measuring 0.4 x 0.3 x 0.3 cm. Ultrasound-guided biopsy of this mass is recommended.\par At the 7 to 8:00 position 5 cm from the nipple, there is a mass measuring 0.4 x 0.3 x 0.2 cm.\par At the 8 to 9:00 position 4 cm from the nipple, there is a circumscribed mass measuring 0.4 x 0.4 x 0.3 cm.\par At the 8 to 9:00 position 3 cm from nipple, corresponding to area palpable concern and mammographic findings, there is a heterogeneous mass measuring 2.5 x 2.3 x 2.0 cm. Ultrasound-guided biopsy is recommended.\par \par On 12/30/21, left breast 3 N6 mass, ultrasound guided needle core biopsies revealed radial sclerosing lesion (radial scar) and Benign atrophic breast tissue with proliferative type fibrocystic changes. Left breast 8-9 N3 mass, ultrasound guided needle core biopsies showed single minute fragment of benign atrophic breast tissue.  \par The right axillary mass, ultrasound guided needle core biopsies were positive for moderately differentiated adenocarcinoma, most likely representative of metastasis from the patient's known mammary primary carcinoma to an axillary lymph node with mervat replacement, obliteration, and extensive extracapsular extension.\par \par On 1/19/22, left breast relatively posterior calcifications, stereotactic guided vacuum assisted needle core biopsies showed radial sclerosing lesion (radial scar) and benign atrophic breast tissue with proliferative type fibrocystic changes. Left relatively anterior calcifications, stereotactic guided vacuum assisted needle core biopsies showed benign atrophic breast tissue with proliferative type fibrocystic changes.  \par \par On 1/5/22, she had b/l breast MRI which showed 8 cm biopsy-proven right breast index carcinoma and axillary mervat metastasis. Biopsy-proven left breast radial scar with no additional sites of suspicious enhancement.\par \par On 2/17/22, she underwent robotic sigmoidectomy and colostomy. The pathology revealed Invasive moderately differentiated adenocarcinoma (6 cm in greatest dimension), invading through muscularis propria into pericolorectal tissue (pT3), +LVI.  All margins were negative for invasive carcinoma.  Twenty two lymph nodes were negative for metastatic carcinoma. She recovered well from surgery. \par \par She is here today with her  to discuss breast cancer treatment. She has no family h/o breast cancer or any other kinds of cancer.

## 2022-03-20 NOTE — PHYSICAL EXAM
[Fully active, able to carry on all pre-disease performance without restriction] : Status 0 - Fully active, able to carry on all pre-disease performance without restriction [Normal] : affect appropriate [de-identified] : There is large protruding mass about 8 cm in the right breast 12:00 3-7 cm from the nipple. There is no nipple retraction, no overlying skin change. The right axillary lymph node is not palpable.  There is no palpable mass in the left breast and no left axillary adenopathy.

## 2022-03-20 NOTE — CONSULT LETTER
[Dear  ___] : Dear  [unfilled], [Consult Letter:] : I had the pleasure of evaluating your patient, [unfilled]. [Please see my note below.] : Please see my note below. [Consult Closing:] : Thank you very much for allowing me to participate in the care of this patient.  If you have any questions, please do not hesitate to contact me. [Sincerely,] : Sincerely, [DrLexis  ___] : Dr. SALES [FreeTextEntry3] : Yogesh Daniel MD

## 2022-03-20 NOTE — ASSESSMENT
[FreeTextEntry1] : 70 yo female has synchronous colon cancer and breast cancer:\par - Invasive moderately differentiated adenocarcinoma of the sigmoid colon, MMR proficient, s/p robotic sigmoidectomy and colostomy. 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 Clinical stage is cT3N1.\par \par Assessment and Plan:\par # IIA ([id number]) adenocarcinoma of the sigmoid colon, MMR proficient, s/p robotic sigmoidectomy. \par -- The pathology report reviewed. She has a couple of high risk factors including bowel obstruction and positive lymphovascular invasion but 22 lymph node were negative. Benefit adjuvant adjuvant chemotherapy is likely to be small and she may forgo it. In addition, she needs to move onto her breast cancer treatment. \par -- Should have surveillance colonoscopy in 6 to 12 months after surgery. \par \par #  Invasive well to moderately differentiated ductal carcinoma of the right breast, ER positive, UT and Her-2 negative, clinical stage cT3N1.\par -- We discussed her diagnosis, clinical stage, prognosis and treatment options. We reviewed biopsy and imaging study reports. Jesica has locally advanced ER receptor positive and Her-2 negative right breast cancer with > 8 cm tumor and biopsy-proven positive axillary adenopathy. Her staging CT and bone scan did not show evidence of distant mets. Due to large tumor burden, she will benefit from neoadjuvant chemotherapy which can down stage tumor, reduce axillary adenopathy and decrease surgical morbidity. She may avoid axillary lymph node dissection if she has good response to chemotherapy and if the clipped positive lymph node as well as other SLN are negative for cancer at the time of her surgery. \par -- We discussed the chemotherapy regimen. Dose dense AC every 2 weeks x 4 followed by Taxol weekly x 12 is recommended. Neulasta injection is needed after dose dense chemotherapy to prevent neutropenia. We reviewed potential side effects and toxicities from the chemotherapy. Adriamycin is associated with cardiac toxicity, may cause cardiomyopathy and decreased heart function. It has a small risk for developing leukemia and bone marrow disorder. The chemotherapy can cause bone marrow suppression, cytopenia, increase risk of infection, nausea, vomiting, diarrhea, fatigue, alopecia, infusional reaction, and neuropathy. \par -- She will be referred for 2D Echo to evaluate LVEF prior to chemotherapy.\par -- Port catheter placement is advised for chemotherapy.\par -- We discussed adjuvant endocrine therapy. In light of estrogen receptor positive breast cancer, she will be benefit with adjuvant endocrine therapy. Letrozole is recommended. The benefit and side effects were reviewed. \par -- Jesica will see Dr. Quinonez to discuss her surgical options and if she should have neoadjuvant chemo followed by surgery. \par \par All questions were answered appropriately. Will talk to her again after she sees Dr. Quinonez.\par

## 2022-03-24 ENCOUNTER — APPOINTMENT (OUTPATIENT)
Dept: BREAST CENTER | Facility: CLINIC | Age: 72
End: 2022-03-24
Payer: MEDICARE

## 2022-03-24 VITALS
BODY MASS INDEX: 28.93 KG/M2 | HEIGHT: 66 IN | SYSTOLIC BLOOD PRESSURE: 160 MMHG | WEIGHT: 180 LBS | DIASTOLIC BLOOD PRESSURE: 93 MMHG | TEMPERATURE: 97.1 F

## 2022-03-24 PROCEDURE — 99214 OFFICE O/P EST MOD 30 MIN: CPT

## 2022-03-26 NOTE — PHYSICAL EXAM
[Normocephalic] : normocephalic [Atraumatic] : atraumatic [EOMI] : extra ocular movement intact [No Supraclavicular Adenopathy] : no supraclavicular adenopathy [No Cervical Adenopathy] : no cervical adenopathy [No Nipple Retraction] : no left nipple retraction [No Nipple Discharge] : no left nipple discharge [No Axillary Lymphadenopathy] : no left axillary lymphadenopathy [Soft] : abdomen soft [Not Tender] : non-tender [No Edema] : no edema [No Rashes] : no rashes [No Ulceration] : no ulceration [de-identified] : @9N2, she has a 3 cm mobile mass without any overlying skin or nipple changes  [de-identified] : @12N3-7, she has a 7 cm mass without any overlying skin changes, no other suspicious masses palpated, no lymphadenopathy

## 2022-03-26 NOTE — ASSESSMENT
[FreeTextEntry1] : Jesica is a 71 F with a RIGHT breast cancer, IDC, [id number], ER pos, TX neg, Her 2 neg, stage IIIA, AJCC 8th edition. \par \par -s/p diverting loop colostomy -- functioning \par -s/p robotic sigmoidectomy and colostomy closure for a pT3N0 mod diff adenocarcinoma with LVI on 2/17/2022\par \par On exam, in her right breast, @12N3-7, she has a 7 cm mass without any overlying skin changes, no other suspicious masses palpated, no lymphadenopathy; in her left breast, @9N2, she has a 3 cm mobile mass without any overlying skin or nipple changes.\par \par Her most recent imaging was a b/l dx mammogram and US on 12/21/2021 which revealed the following: \par -L: heterogenous calcifications in UOQ, L --> posterior extent was biopsy proven radial scar, anterior extent was benign FC changes\par b/l US \par RIGHT: \par -R axilla, @11N13, abnormal LN, measures 2.3 x 1.3 x 0.9 cm --> bx proven metastatic carcinoma with JESSICA\par -@6N3, benign cyst, measures 6 x 3 x 5 mm \par -@11-1N3-4, irregular hypoechoic mass, measures 10 cm --> biopsy proven cancer \par LEFT: \par -@3N6, mass measuring 4 x 3 x 3 mm --> radial scar \par -@7-8N5, mass, measuring 4 x 3 x 3 mm\par -@8-9N4, circumscribed mass, measuring 4 x 4 x 3 mm \par -@8-9N3, c/w area of palpable concern, heterogenous mass, measures 2.5 x 2.3 x 2 cm --> fibrous mastopathy \par \par \par She had a breast MRI on 1/5/2022 which revealed the following:\par RIGHT: \par -8 x 4 x 7 cm irregular enhancing mass, occupies a large portion of superior half of breast, extends to involve inferior half of breast, c.w biopsy proven cancer \par -morphologically abnormal LN in R axilla was associated bx marker c.w biopsy proven metastatic cancer \par LEFT: \par -medial L, anterior depth, bx marker with biopsy site changes \par -in UOQ, second bx marker with associated 0.6 cm enhancing mass, c/w biopsy proven radial scar \par -no adenopathy \par \par She had a bone scan which did not reveal any evidence of bony metastasis. \par \par She will likely need systemic chemotherapy given the size of the tumor and lymph node status.  We discussed the option of giving chemotherapy before or after surgery.  If given before surgery, this is considered neoadjuvant chemotherapy.  The benefits of undergoing neoadjuvant chemotherapy is that it will provide us with better local control of her breast cancer especially if she has a good pathologic response.  If she has a complete clinical response in her breast and lymph nodes, we may be able to avoid a mastectomy which would reduce the morbidity of her surgery.  In addition, it gives us prognostic information regarding her tumor response to chemotherapy.  Patients who have a complete pathologic response (pCR) were found to have an improved prognosis compared to women who do not have a pCR.   \par \par In regards to her treatment options: \par -If she were to pursue surgery upfront, she would need a right breast mastectomy and ALND.  She would have the option of undergoing immediate vs. delayed reconstruction with a mastectomy.  However, this may be revisited following her breast MRI and following neoadjuvant chemotherapy.\par \par In regards to radiation therapy, she may need post mastectomy radiation given her positive lymph node.  \par \par At the completion of all these therapy, she will need endocrine therapy, likely with an AI as she is post menopausal, which will reduce her rate of recurrence by about 50% to 2/3rds.\par \par Per ASBrS consensus guidelines, any patient with a diagnosis of breast cancer should be offered panel genetic testing as 10% of these patients were found to have a mutation.  THis was offered to her but she is not interested in genetic testing at this time. \par \par AS REVIEW: \par She had a CT A/P on 11/24/2021 which revealed a 5 cm segment of masslike stricturing in the proximal sigmoid colon. A tiny adjacent lymph node is noted in the sigmoid mesentery. She also had a CT chest which revealed on 11/26/2021 a right breast mass, measuring 6.5 x 3.8 x 7.0 cm and contains small calcifications. The left breast mass measures 3.1 x 2.9 x 2.6 cm.\par \par All of her questions were answered.  She knows to call with any further questions or concerns. \par \par PLAN: \par -medical oncology follow up\par -possible oncotype \par -if surgery upfront, RIGHT MODIFIED RADICAL MASTECTOMY WITH EXCISION OF R AXILLARY LYMPH NODE WITH RF LOCALIZATION \par -if proceeds with neoadjuvant chemotherapy, then I will have her follow up after completing therapies

## 2022-03-26 NOTE — HISTORY OF PRESENT ILLNESS
[FreeTextEntry1] : Jesica is a 71 F with a RIGHT breast cancer, IDC, [id number], ER pos, PA neg, Her 2 neg, stage IIIA, AJCC 8th edition. \par \par She first palpated a right breast mass over 1 year prior.  She does not think that it has changed in size and she denies any pain in that area.  She denies palpating any left breast masses and denies any nipple discharge, retraction or other skin changes. \par \par Of note, she was recently admitted to the hospital with symptoms of constipation and fullness.  She was found to have a large bowel obstruction secondary to a sigmoid colon cancer. She had to undergo a diverting colostomy (Dr. Fritz) and is being scheduled for a sigmoidectomy for invasive adenocarcinoma, moderately differentiated.\par \par She was found to have a palpable right breast mass during her admission so a punch biopsy was performed on her right breast mass on 2021 which revealed IDC, well to moderately differentiated, ER pos, PA neg, Her 2 neg, Ki 67: 3%. \par \par She has never had a mammogram or other breast imaging. \par \par While she was an inpatient, she had the following staging scans: \par 2021 -- CT A/P \par -5 cm segment of masslike stricturing in the proximal sigmoid colon. A tiny adjacent lymph node is noted in the sigmoid mesentery\par \par 2021 -- CT chest \par -degenerative changes of the spine \par -RIGHT breast mass, 6.5 x 3.8 x 7 cm with small calcifications \par -LEFT breast mass, 3.1 x 2.9 x 2.6 cm \par -no suspicious lung nodules \par \par 2021 -- colonoscopy \par -s/p biopsy of sigmoid mass -- invasive adenocarcinoma, moderately differentiated \par \par -s/p diverting loop colostomy -- functioning \par -s/p robotic sigmoidectomy and colostomy closure for a pT3N0 mod diff adenocarcinoma with LVI on 2022\par \par HISTORICAL RISK FACTORS: \par -no prior breast biopsies or surgeries \par -no family history of breast or ovarian cancer \par -, age at first live birth was 28 \par -no prior OCP use \par -no gyn surgeries\par \par INTERVAL HISTORY: \par Jesica is a 71 F who returns for management of her right breast cancer. \par \par Her complete work up was is as follows: \par 2021 -- b/l dx mammogram and US \par -heterogenously dense breasts\par -R: UOQ, irregular mass with associated heterogenous calcifications, c/w previously biopsy proven malignant mass \par -L: circumscribed mass in medial L \par -multiple additional scattered subcm masses throughout the L breast \par -L: heterogenous calcifications in UOQ, L \par b/l US \par RIGHT: \par -R axilla, @11N13, abnormal LN, measures 2.3 x 1.3 x 0.9 cm --> BIOPSY \par -@6N3, benign cyst, measures 6 x 3 x 5 mm \par -@11-1N3-4, irregular hypoechoic mass, measures 10 cm, biopsy proven cancer \par LEFT: \par -@3N6, mass measuring 4 x 3 x 3 mm --> BIOPSY \par -@7-8N5, mass, measuring 4 x 3 x 3 mm\par -@8-9N4, circumscribed mass, measuring 4 x 4 x 3 mm \par -@8-9N3, c/w area of palpable concern, heterogenous mass, measures 2.5 x 2.3 x 2 cm --> BIOPSY \par BIRADS 4 \par \par 2021 -- b/l US CNBx \par 1. LEFT @3N6, 4 mm mass, minicork clip \par -radial scar \par -benign breast tissue \par \par 2. LEFT @8-9N3, 2.5 cm mass, tophat clip \par -fibrous mastopathy\par \par 3. R axillary mass, @11N13, 2.3 cm, stoplight clip \par -metastatic carcinoma with JESSICA \par \par 2022 -- L stereoBx, calcs\par 1. L posterior/lateral calcifications, tophat clip \par -radial scar \par \par 2. L anterior/lateral calcifications, hourglass clip  \par -FC changes \par -focal fat necrosis \par \par 2022 -- MR breast \par RIGHT: \par -8 x 4 x 7 cm irregular enhancing mass, occupies a large portion of superior half of breast, extends to involve inferior half of breast, c.w biopsy proven cancer \par -morphologically abnormal LN in R axilla was associated bx marker c.w biopsy proven metastatic cancer \par LEFT: \par -medial L, anterior depth, bx marker with biopsy site changes \par -in UOQ, second bx marker with associated 0.6 cm enhancing mass, c/w biopsy proven radial scar \par -no adenopathy \par BIRADS 6\par \par 2022 -- bone scan \par -no evidence of metastatic bone disease \par \par Since her last visit, she underwent a robotic sigmoid colectomy and colostomy closure by Dr. Carbajal.  She is well healed from this surgery and is having normal BMs.  \par \par She has had no changes to her breast.  She did meet with medical oncology, Dr. Daniel, who recommended neoadjuvant chemotherapy given the locally advanced nature of her disease.  She is here for further surgical discussion.

## 2022-03-26 NOTE — REASON FOR VISIT
[FreeTextEntry1] : right breast IDC, [id number], ER pos, VT neg, Her 2 neg, stage 3 right breast cancer, left breast mass

## 2022-03-26 NOTE — PAST MEDICAL HISTORY
[Postmenopausal] : The patient is postmenopausal [Menarche Age ____] : age at menarche was [unfilled] [Total Preg ___] : G[unfilled] [Live Births ___] : P[unfilled]  [Age At Live Birth ___] : Age at live birth: [unfilled] [History of Hormone Replacement Treatment] : has no history of hormone replacement treatment [FreeTextEntry5] : denies [FreeTextEntry6] : denies [FreeTextEntry8] : denies  [FreeTextEntry7] : denies

## 2022-03-26 NOTE — REVIEW OF SYSTEMS
[As Noted in HPI] : as noted in HPI [Breast Lump] : breast lump [Negative] : Heme/Lymph [Fever] : no fever [Chills] : no chills [Abdominal Pain] : no abdominal pain [Vomiting] : no vomiting [Constipation] : no constipation [Diarrhea] : no diarrhea [Abn Vaginal Bleeding] : no unexplained vaginal bleeding [Skin Lesions] : no skin lesions [Skin Wound] : no skin wound [FreeTextEntry7] : as per HPI  [Breast Pain] : no breast pain

## 2022-03-26 NOTE — DATA REVIEWED
[FreeTextEntry1] : EXAM:  MG US BREAST COMPLETE BI\par EXAM:  MG MAMMO DIAG W WADE BI#\par \par \par PROCEDURE DATE:  12/21/2021\par \par \par \par INTERPRETATION:  Clinical History / Reason for exam: Biopsy-proven right breast cancer.\par \par The patient reports her last clinical breast examination was performed within the past month.\par \par Family history: None\par \par Comparisons: None.\par \par Views obtained:Bilateral full field CC and MLO views with tomosynthesis. Spot compression views of bilateral breast with tomosynthesis.\par \par Computer-aided detection was utilized in the interpretation of this examination.\par \par Breast composition:The breasts are heterogeneously dense, which may obscure small masses.\par \par Findings:\par \par Mammogram:\par \par Triangular marker denotes the site of palpable concern in bilateral breasts. In the right breast upper outer quadrant, there is an irregular mass with associated heterogeneous calcifications, corresponding to the previously biopsied malignant mass. There is a circumscribed mass in the medial left breast corresponding to the area of palpable concern. Multiple additional scattered subcentimeter masses are seen throughout the left breast. There are heterogeneous calcifications in the upper outer quadrant of the left breast.\par \par Ultrasound:\par \par Bilateral whole breast ultrasound was performed.\par \par Right breast:\par In the right axilla, at the 11:00 position 13 cm from the nipple, there is an abnormal lymph node measuring 2.3 x 1.3 x 0.9 cm. Ultrasound-guided biopsy is recommended.\par \par At the 6:00 position 3 cm from the nipple, a benign cyst measuring 0.6 x 0.3 x 0.5 cm.\par \par At the 11 to 1:00 position 3 to 4 cm from the nipple, there is an irregular hypoechoic mass measuring 10 cm corresponding to the biopsy-proven right breast carcinoma.\par \par Left breast:\par Scattered similar appearing subcentimeter circumscribed masses are seen throughout the left breast. These correspond to mammographically demonstrated masses:\par \par At the 3:00 position 6 cm from the nipple, there is a mass measuring 0.4 x 0.3 x 0.3 cm. Ultrasound-guided biopsy of this mass is recommended.\par At the 7 to 8:00 position 5 cm from the nipple, there is a mass measuring 0.4 x 0.3 x 0.2 cm.\par At the 8 to 9:00 position 4 cm from the nipple, there is a circumscribed mass measuring 0.4 x 0.4 x 0.3 cm.\par At the 8 to 9:00 position 3 cm from nipple, corresponding to area palpable concern and mammographic findings, there is a heterogeneous mass measuring 2.5 x 2.3 x 2.0 cm. Ultrasound-guided biopsy is recommended.\par \par Impression: Biopsy-proven right breast carcinoma. Right axillary adenopathy and indeterminate left breast 3:00 and 8-9:00 N3 masses under sonographic guidance is recommended. Intervention of additional calcifications and masses in the left breast may be determined pending biopsy results.\par \par Recommendation: Ultrasound guided biopsy.\par \par BI-RADS Category 4: Suspicious\par The above findings and recommendations were discussed with the patient at the time of the examination.\par \par Findings and recommendations were discussed with Dr. Quinonez on 12/21/2021.\par \par --- End of Report ---\par \par \par \par \par MIMI KIRBY DO; Attending Radiologist\par This document has been electronically signed. Dec 21 2021  2:36PM\par \par EXAM:  MG MAMMO DIAG BI\par EXAM:  MG US BX BRST ADD RT SISC\par EXAM:  MG US BX BRST 1ST LT SISC\par EXAM:  MG US BX BRST ADD LT SISC\par \par *** ADDENDUM ***\par \par FINAL DIAGNOSIS\par 1. BREAST, LEFT 3 N6 MASS, ULTRASOUND GUIDED NEEDLE CORE BIOPSIES:\par - RADIAL SCLEROSING LESION (RADIAL SCAR) ASSOCIATED WITH CALCIUM OXOLATE\par CRYSTALS.\par - BENIGN ATROPHIC BREAST TISSUE WITH PROLIFERATIVE TYPE FIBROCYSTIC\par CHANGES.\par \par Findings are benign high risk concordant.\par \par 2. BREAST, LEFT 8-9 N3 MASS, ULTRASOUND GUIDED NEEDLE CORE BIOPSIES:\par - SINGLE MINUTE FRAGMENT OF BENIGN ATROPHIC BREAST TISSUE WITH USUAL TYPE\par DUCT HYPERPLASIA AND DOMINANT STROMAL FIBROSIS (FIBROUS MASTOPATHY).\par \par Findings are benign concordant.\par \par 3. BREAST, RIGHT AXILLARY MASS, ULTRASOUND GUIDED NEEDLE CORE BIOPSIES:\par - FIBROADIPOSE CONNECTIVE TISSUE CONTAINING NODULAR FRAGMENTS OF\par MODERATELY DIFFERENTIATED ADENOCARCINOMA; MOST LIKELY REPRESENTATIVE\par OF METASTASIS (1/1) FROM THE PATIENT'S KNOWN MAMMARY PRIMARY CARCINOMA\par TO AN AXILLARY LYMPH NODE WITH MERVAT REPLACEMENT, OBLITERATION, AND EXTENSIVE EXTRACAPSULAR EXTENSION.\par \par Findings are malignant concordant.\par \par Recommendations: Continued surgical and oncologic management is recommended. As per prior diagnostic mammogram and ultrasound report, stereotactic guided biopsy of the left breast calcifications also recommended.\par \par Findings and recommendations were discussed with the patient on 1/6/2022.\par \par Findings and recommendations were discussed with ERWIN Padilla on 1/7/2022.\par \par --- End of Report ---\par \par *** END OF ADDENDUM ***\par \par \par \par PROCEDURE DATE:  12/30/2021\par \par \par \par INTERPRETATION:  Clinical History / Reason for exam: Left breast masses and right axillary adenopathy.\par \par Images were reviewed. Informed consent was obtained including a discussion of risks and benefits of the procedure as well as questioning about patient allergies. The patient safety checklist, including time out procedure, was used.\par \par Site 1: Left breast:\par The mass in the 3 o clock position 6 cm from the nipple was identified. Under sterile conditions and after local infiltration with 5 mL buffered 1% lidocaine a 14g spring loaded core biopsy needle was inserted. Under ultrasound guidance the needle was positioned adjacent to the mass and 2 core samples were obtained. The needle was removed.  A marking clip ([employer] mini-cork) was deployed under ultrasound guidance. Manual compression was applied to achieve hemostasis.\par \par Site 2: Left breast:\par The mass in the 8-9 o clock position 3 cm from the nipple was identified. Under sterile conditions and after local infiltration with 5 mL buffered 1% lidocaine a 14g spring loaded core biopsy needle was inserted. Under ultrasound guidance the needle was positioned adjacent to the mass and 2 core samples were obtained. The needle was removed.  A marking clip ([employer] top-hat) was deployed under ultrasound guidance. Manual compression was applied to achieve hemostasis.\par \par Site 3: Right breast:\par The mass in the 11 o clock position 13 cm from the nipple was identified. Under sterile conditions and after local infiltration with 5 mL buffered 1% lidocaine a 14g spring loaded core biopsy needle was inserted. Under ultrasound guidance the needle was positioned adjacent to the mass and 4 core samples were obtained. The needle was removed.  A marking clip ([employer] stop-light) was deployed under ultrasound guidance. Manual compression was applied to achieve hemostasis.\par \par The patient tolerated the procedure well and there was no immediate post procedure complication.  The specimens were sent to the laboratory for analysis.\par \par A bilateral mammogram was performed:\par VIEWS: CC and ML views of the bilateral breasts.\par BREAST COMPOSITION: The breasts are heterogeneously dense, which may obscure small masses.\par FINDINGS: The biopsy clips placed during the US guided biopsy are in the anticipated location in the bilateral breasts.\par FINAL ASSESSMENT Category: Post Procedure Mammogram for Marker Placement\par \par IMPRESSION:\par \par Successful ultrasound guided core biopsy of the bilateral breasts.\par \par Histology: Pending, to be reported in an addendum.\par \par --- End of Report ---\par ***Please see the addendum at the top of this report. It may contain additional important information or changes.****\par \par \par \par MIMI KIRBY DO; Attending Radiologist\par This document has been electronically signed. Dec 30 2021 10:37AM\par Addend:MIMI KIRBY DO; Attending Radiologist\par This addendum was electronically signed on: Jan 13 2022  1:40PM\par \par ACC: [id number]     EXAM:  MR BREAST WAW IC BI\par \par PROCEDURE DATE:  01/05/2022\par \par \par \par INTERPRETATION:  Clinical History / Reason for exam: Biopsy-proven right breast carcinoma and left breast radial scar. Evaluate for extent of disease.\par \par Technique: Breast MRI is performed at 1.5 T with the patient prone and the breasts in a dedicated breast coil. Following a 3 plane localizer, sagittal T1 weighted, fat-saturated T1 weighted and fat saturated T2-weighted sequence; dynamic contrast enhanced sagittal images; and delayed post-contrast axial fat-saturated T1 weighted images were obtained. 8.5 mL gadolinium contrast was injected and 1.5 mL was discarded. Subtraction and MIP images were reviewed.  Movatu software was used in interpretation.\par \par Comparison: Priors dating back to 12/21/2021.\par \par Findings:\par \par Amount of fibroglandular tissue: Heterogeneous fibroglandular tissue\par \par Background parenchymal enhancement: Minimal, Symmetric\par \par RIGHT BREAST:\par There is a 8.0 x 4.0 x 7.0 cm irregular enhancing mass which occupies a large portion of the superior half of the breast and extends to involve the inferior half of the breast. This corresponds with the biopsy-proven index carcinoma.\par \par There is a morphologically abnormal lymph node in the right axilla with an associated biopsy marker consistent with biopsy-proven mervat metastasis.\par \par LEFT BREAST:\par In the medial left breast anterior depth, there is a biopsy marker with associated biopsy site changes corresponding to a recent benign biopsy.\par \par In the upper outer quadrant middle depth, there is a second biopsy marker which is associated with a 0.6 cm enhancing mass, corresponding to the biopsy-proven radial scar.\par \par The nipple and skin appear normal.\par There is no axillary adenopathy.\par \par The imaged portions of the chest and abdomen demonstrate nonenhancing hepatic cyst.\par \par Impression:\par \par Biopsy-proven right breast index carcinoma and axillary mervat metastasis.\par \par Biopsy-proven left breast radial scar with no additional sites of suspicious enhancement. However, please note that as per prior diagnostic mammogram and ultrasound report dated 12/21/2021 a stereotactic guided biopsy of the left breast calcifications should be performed.\par \par Recommendation: Stereotactic guided biopsy.\par \par BI-RADS Category 6: Known Biopsy-Proven Malignancy\par \par Findings and recommendations were discussed with ERWIN Padilla on 1/7/2022.\par \par --- End of Report ---\par \par \par \par \par \par MIMI KIRBY DO; Attending Radiologist\par This document has been electronically signed. Jan 7 2022 12:49PM\par ACC: [id number]     EXAM:  NM BONE SPECT SA SD\par ACC: [id number]     EXAM:  NM BONE IMG WHOLE BODY\par \par PROCEDURE DATE:  01/17/2022\par \par \par \par INTERPRETATION:  Procedure: Whole body and regional bone images and bone SPECT\par \par Reason: Breast cancer. Evaluate for metastatic disease.\par \par Colon mass removed 2 years ago.\par \par Approximately 3 hours after the intravenous administration of  21.2 millicuries 99m technetium MDP, anterior and posterior whole body format bone images were obtained from head to feet.  In addition regional images were also obtained and SPECT images were obtained over the chest and reconstructions and three-dimensional reconstructions were generated.\par \par The images reveal no evidence for metastatic bone disease.\par \par Increased bone agent uptake is noted within the right breast correlating with known biopsy-proven cancer.\par \par Focal increased bone agent uptake within the manubrium/sternum, likely degenerative. Increased bone agent uptake traversing both elbows, shoulders, wrists and knees consistent with joint abnormalities.\par \par Increased uptake within the cervical spine consistent with degenerative changes.\par \par Both kidneys are visualized.\par \par Impression:\par \par No evidence metastatic bone disease.\par \par Increased bone agent uptake within right breast correlating with patient's known biopsy-proven cancer.\par \par --- End of Report ---\par \par \par \par \par \par ELLIOT LANDAU MD; Attending Radiologist\par This document has been electronically signed. Jan 18 2022  9:56AM\par \par  \par

## 2022-03-28 ENCOUNTER — APPOINTMENT (OUTPATIENT)
Dept: CARDIOLOGY | Facility: CLINIC | Age: 72
End: 2022-03-28
Payer: MEDICARE

## 2022-03-28 DIAGNOSIS — Z86.79 PERSONAL HISTORY OF OTHER DISEASES OF THE CIRCULATORY SYSTEM: ICD-10-CM

## 2022-03-28 PROCEDURE — 93306 TTE W/DOPPLER COMPLETE: CPT

## 2022-03-29 PROBLEM — Z86.79 HISTORY OF HYPERTENSION: Status: RESOLVED | Noted: 2021-12-22 | Resolved: 2022-03-29

## 2022-04-04 ENCOUNTER — NON-APPOINTMENT (OUTPATIENT)
Age: 72
End: 2022-04-04

## 2022-04-08 ENCOUNTER — NON-APPOINTMENT (OUTPATIENT)
Age: 72
End: 2022-04-08

## 2022-04-12 ENCOUNTER — APPOINTMENT (OUTPATIENT)
Dept: SURGERY | Facility: CLINIC | Age: 72
End: 2022-04-12
Payer: MEDICARE

## 2022-04-12 ENCOUNTER — APPOINTMENT (OUTPATIENT)
Dept: SURGERY | Facility: CLINIC | Age: 72
End: 2022-04-12

## 2022-04-12 VITALS
HEART RATE: 84 BPM | WEIGHT: 185 LBS | BODY MASS INDEX: 29.73 KG/M2 | OXYGEN SATURATION: 99 % | DIASTOLIC BLOOD PRESSURE: 82 MMHG | SYSTOLIC BLOOD PRESSURE: 142 MMHG | TEMPERATURE: 97.3 F | HEIGHT: 66 IN

## 2022-04-12 PROCEDURE — 99024 POSTOP FOLLOW-UP VISIT: CPT

## 2022-04-12 PROCEDURE — 17250 CHEM CAUT OF GRANLTJ TISSUE: CPT | Mod: 58

## 2022-04-12 NOTE — PHYSICAL EXAM
[Abdomen Masses] : No abdominal masses [Abdomen Tenderness] : ~T No ~M abdominal tenderness [No HSM] : no hepatosplenomegaly [Respiratory Effort] : normal respiratory effort [Normal Rate and Rhythm] : normal rate and rhythm [de-identified] : RUQ stoma site with external granuloma tissue [de-identified] : awake alert and in no acute distress

## 2022-04-12 NOTE — PROCEDURE
[FreeTextEntry1] : Silver Nitrate applied to RUQ stoma site granuloma. dressing applied. Pt instructed on how to care for area. MD Rebecca reagan

## 2022-04-12 NOTE — HISTORY OF PRESENT ILLNESS
[FreeTextEntry1] : Patient is a 71F with PMH of right breast mass biopsy proven ductal carcinoma with mucinous features,  s/p transverse loop colostomy creation for LBO secondary to obstructing sigmoid adenocarcinoma no presents s/p robotic sigmoidectomy and colostomy closure.  Pathology showed T3N0 (0/22LN) moderately differentiated adenocarcinoma with LVI. She is tolerating a diet and having daily BM.   Patient states she feels well.  Patient denies fevers, chills, nausea, vomiting, abdominal pain, constipation, diarrhea, blood in his stool or unexpected weight loss.  Patient denies a family history of colon cancer rectal cancer or inflammatory bowel disease.\par Pt's RUQ stoma site has an area of granuloma tissue appx 3 cm x 1 cm. \par Pt is starting treatment including port placement, neoadjuvant chemotherapy, and surgery for Breast CA in the upcoming weeks.

## 2022-04-12 NOTE — ASSESSMENT
[FreeTextEntry1] : Pt reported feeling well after surgery. \par \par Silver Nitrate applied to RUQ stoma site\par \par CEA in 3 months\par \par Repeats CT C/A/P 1 year after surgery. \par \par Pt will return in 1 week for a wound check \par \par F/u with Dr wall

## 2022-04-13 ENCOUNTER — APPOINTMENT (OUTPATIENT)
Dept: CARDIOLOGY | Facility: CLINIC | Age: 72
End: 2022-04-13
Payer: MEDICARE

## 2022-04-13 VITALS — WEIGHT: 185 LBS | HEIGHT: 66 IN | BODY MASS INDEX: 29.73 KG/M2

## 2022-04-13 VITALS — DIASTOLIC BLOOD PRESSURE: 84 MMHG | HEART RATE: 76 BPM | SYSTOLIC BLOOD PRESSURE: 136 MMHG | RESPIRATION RATE: 18 BRPM

## 2022-04-13 PROCEDURE — 93000 ELECTROCARDIOGRAM COMPLETE: CPT

## 2022-04-13 PROCEDURE — 99214 OFFICE O/P EST MOD 30 MIN: CPT

## 2022-04-14 ENCOUNTER — OUTPATIENT (OUTPATIENT)
Dept: OUTPATIENT SERVICES | Facility: HOSPITAL | Age: 72
LOS: 1 days | Discharge: HOME | End: 2022-04-14

## 2022-04-14 VITALS
HEART RATE: 85 BPM | WEIGHT: 184.97 LBS | OXYGEN SATURATION: 97 % | SYSTOLIC BLOOD PRESSURE: 180 MMHG | DIASTOLIC BLOOD PRESSURE: 90 MMHG | TEMPERATURE: 97 F | HEIGHT: 68 IN | RESPIRATION RATE: 16 BRPM

## 2022-04-14 DIAGNOSIS — Z45.2 ENCOUNTER FOR ADJUSTMENT AND MANAGEMENT OF VASCULAR ACCESS DEVICE: ICD-10-CM

## 2022-04-14 DIAGNOSIS — Z01.818 ENCOUNTER FOR OTHER PREPROCEDURAL EXAMINATION: ICD-10-CM

## 2022-04-14 DIAGNOSIS — Z90.49 ACQUIRED ABSENCE OF OTHER SPECIFIED PARTS OF DIGESTIVE TRACT: Chronic | ICD-10-CM

## 2022-04-14 LAB
ALBUMIN SERPL ELPH-MCNC: 4.8 G/DL — SIGNIFICANT CHANGE UP (ref 3.5–5.2)
ALP SERPL-CCNC: 90 U/L — SIGNIFICANT CHANGE UP (ref 30–115)
ALT FLD-CCNC: 12 U/L — SIGNIFICANT CHANGE UP (ref 0–41)
ANION GAP SERPL CALC-SCNC: 18 MMOL/L — HIGH (ref 7–14)
APTT BLD: 30.8 SEC — SIGNIFICANT CHANGE UP (ref 27–39.2)
AST SERPL-CCNC: 16 U/L — SIGNIFICANT CHANGE UP (ref 0–41)
BASOPHILS # BLD AUTO: 0.04 K/UL — SIGNIFICANT CHANGE UP (ref 0–0.2)
BASOPHILS NFR BLD AUTO: 0.6 % — SIGNIFICANT CHANGE UP (ref 0–1)
BILIRUB SERPL-MCNC: 0.6 MG/DL — SIGNIFICANT CHANGE UP (ref 0.2–1.2)
BUN SERPL-MCNC: 22 MG/DL — HIGH (ref 10–20)
CALCIUM SERPL-MCNC: 9.7 MG/DL — SIGNIFICANT CHANGE UP (ref 8.5–10.1)
CHLORIDE SERPL-SCNC: 98 MMOL/L — SIGNIFICANT CHANGE UP (ref 98–110)
CO2 SERPL-SCNC: 26 MMOL/L — SIGNIFICANT CHANGE UP (ref 17–32)
CREAT SERPL-MCNC: 0.9 MG/DL — SIGNIFICANT CHANGE UP (ref 0.7–1.5)
EGFR: 68 ML/MIN/1.73M2 — SIGNIFICANT CHANGE UP
EOSINOPHIL # BLD AUTO: 0.08 K/UL — SIGNIFICANT CHANGE UP (ref 0–0.7)
EOSINOPHIL NFR BLD AUTO: 1.3 % — SIGNIFICANT CHANGE UP (ref 0–8)
GLUCOSE SERPL-MCNC: 89 MG/DL — SIGNIFICANT CHANGE UP (ref 70–99)
HCT VFR BLD CALC: 48.3 % — HIGH (ref 37–47)
HGB BLD-MCNC: 15.9 G/DL — SIGNIFICANT CHANGE UP (ref 12–16)
IMM GRANULOCYTES NFR BLD AUTO: 0.3 % — SIGNIFICANT CHANGE UP (ref 0.1–0.3)
INR BLD: 0.99 RATIO — SIGNIFICANT CHANGE UP (ref 0.65–1.3)
LYMPHOCYTES # BLD AUTO: 1.25 K/UL — SIGNIFICANT CHANGE UP (ref 1.2–3.4)
LYMPHOCYTES # BLD AUTO: 19.8 % — LOW (ref 20.5–51.1)
MCHC RBC-ENTMCNC: 28.8 PG — SIGNIFICANT CHANGE UP (ref 27–31)
MCHC RBC-ENTMCNC: 32.9 G/DL — SIGNIFICANT CHANGE UP (ref 32–37)
MCV RBC AUTO: 87.3 FL — SIGNIFICANT CHANGE UP (ref 81–99)
MONOCYTES # BLD AUTO: 0.38 K/UL — SIGNIFICANT CHANGE UP (ref 0.1–0.6)
MONOCYTES NFR BLD AUTO: 6 % — SIGNIFICANT CHANGE UP (ref 1.7–9.3)
NEUTROPHILS # BLD AUTO: 4.55 K/UL — SIGNIFICANT CHANGE UP (ref 1.4–6.5)
NEUTROPHILS NFR BLD AUTO: 72 % — SIGNIFICANT CHANGE UP (ref 42.2–75.2)
NRBC # BLD: 0 /100 WBCS — SIGNIFICANT CHANGE UP (ref 0–0)
PLATELET # BLD AUTO: 258 K/UL — SIGNIFICANT CHANGE UP (ref 130–400)
POTASSIUM SERPL-MCNC: 3.9 MMOL/L — SIGNIFICANT CHANGE UP (ref 3.5–5)
POTASSIUM SERPL-SCNC: 3.9 MMOL/L — SIGNIFICANT CHANGE UP (ref 3.5–5)
PROT SERPL-MCNC: 7.4 G/DL — SIGNIFICANT CHANGE UP (ref 6–8)
PROTHROM AB SERPL-ACNC: 11.4 SEC — SIGNIFICANT CHANGE UP (ref 9.95–12.87)
RBC # BLD: 5.53 M/UL — HIGH (ref 4.2–5.4)
RBC # FLD: 14 % — SIGNIFICANT CHANGE UP (ref 11.5–14.5)
SODIUM SERPL-SCNC: 142 MMOL/L — SIGNIFICANT CHANGE UP (ref 135–146)
WBC # BLD: 6.32 K/UL — SIGNIFICANT CHANGE UP (ref 4.8–10.8)
WBC # FLD AUTO: 6.32 K/UL — SIGNIFICANT CHANGE UP (ref 4.8–10.8)

## 2022-04-14 NOTE — H&P PST ADULT - REASON FOR ADMISSION
72 yo female presents for PAST in preparation for port placement on 4/20/2022 under [facility] by Dr. Pérez (IR).

## 2022-04-14 NOTE — H&P PST ADULT - HISTORY OF PRESENT ILLNESS
Pt recently diagnosed with breast CA; required to begin chemotherapy. Now for listed procedure.    Denies any chest pain, difficulty breathing, SOB, palpitations, dysuria, URI, or any other infections in the last 2 weeks. Denies any recent travel, contact, or exposure to any persons with known or suspected COVID-19. Pt also denies COVID testing within the last 2 weeks. Pt admits to receiving all doses of Moderna COVID vaccine. Denies any suicidal or homicidal ideations. Pt advised to self quarantine until day of procedure. Exercise tolerance of 2-3 flights of stairs without dyspnea. DON reviewed with patient. Pt verbalized understanding of all pre-operative instructions.    Anesthesia Alert  NO--Difficult Airway  NO--History of neck surgery or radiation  NO--Limited ROM of neck  NO--History of Malignant hyperthermia  NO--No personal or family history of Pseudocholinesterase deficiency.  NO--Prior Anesthesia Complication  NO--Latex Allergy  NO--Loose teeth  NO--History of Rheumatoid Arthritis  NO--DON  NO--Bleeding Risk  NO--Other_____

## 2022-04-14 NOTE — H&P PST ADULT - VENOUS THROMBOEMBOLISM CURRENT STATUS
DANTE Amato      
(2) malignancy (present or previous)

## 2022-04-14 NOTE — H&P PST ADULT - GUM GEN PE MLT EXAM PC
Cont. statin Cont. statin Cont. statin Cont. statin Cont. statin Cont. statin Cont. statin Medically optimized for the proposed procedure   -Continue Coreg perioperatively. Medically optimized for the proposed procedure   -Continue Coreg perioperatively. Medically optimized for the proposed procedure (se my note yesterday)  -Continue Coreg perioperatively. See my note yesterday.  Echo and stress test results obtained from Dr Zavala's office: Normal EF 55%, Negative stress test on 2/5/2019.  Repeat EKG did not show significant changes. Pt is medically optimized for the proposed procedure.  -Continue Coreg perioperatively. not examined

## 2022-04-17 ENCOUNTER — LABORATORY RESULT (OUTPATIENT)
Age: 72
End: 2022-04-17

## 2022-04-18 ENCOUNTER — APPOINTMENT (OUTPATIENT)
Dept: SURGERY | Facility: CLINIC | Age: 72
End: 2022-04-18
Payer: MEDICARE

## 2022-04-18 ENCOUNTER — OUTPATIENT (OUTPATIENT)
Dept: OUTPATIENT SERVICES | Facility: HOSPITAL | Age: 72
LOS: 1 days | Discharge: HOME | End: 2022-04-18
Payer: MEDICARE

## 2022-04-18 ENCOUNTER — RESULT REVIEW (OUTPATIENT)
Age: 72
End: 2022-04-18

## 2022-04-18 VITALS
DIASTOLIC BLOOD PRESSURE: 86 MMHG | OXYGEN SATURATION: 97 % | SYSTOLIC BLOOD PRESSURE: 150 MMHG | HEART RATE: 82 BPM | BODY MASS INDEX: 29.57 KG/M2 | HEIGHT: 66 IN | TEMPERATURE: 97 F | WEIGHT: 184 LBS

## 2022-04-18 DIAGNOSIS — Z90.49 ACQUIRED ABSENCE OF OTHER SPECIFIED PARTS OF DIGESTIVE TRACT: Chronic | ICD-10-CM

## 2022-04-18 DIAGNOSIS — C50.911 MALIGNANT NEOPLASM OF UNSPECIFIED SITE OF RIGHT FEMALE BREAST: ICD-10-CM

## 2022-04-18 PROCEDURE — 71250 CT THORAX DX C-: CPT | Mod: 26,MH

## 2022-04-18 PROCEDURE — 99024 POSTOP FOLLOW-UP VISIT: CPT

## 2022-04-18 NOTE — ASSESSMENT
[FreeTextEntry1] : 71F with concurrent breast and obstructing colon cancer s/p diverting loop colostomy now s/p robotic sigmoidectomy and colostomy closure with T3N0 cancer with LVI\par \par Patient is healing well.  We discussed the need for colonoscopy once she has completed her treatment for her breast ca.  We will see her back in 6 months.

## 2022-04-18 NOTE — HISTORY OF PRESENT ILLNESS
[FreeTextEntry1] : Patient is a 71F with PMH of right breast mass biopsy proven ductal carcinoma with mucinous features,  s/p transverse loop colostomy creation for LBO secondary to obstructing sigmoid adenocarcinoma no presents s/p robotic sigmoidectomy and colostomy closure.  Pathology showed T3N0 (0/22LN) moderately differentiated adenocarcinoma with LVI. She is tolerating a diet and having daily BM.  She will not get chemotherapy for her colon cancer.  She is pending port placement for chemotherapy for her breast ca prior to mastectomy. Patient states she feels well.  She is tolerating a diet and her stoma is working well. Patient denies fevers, chills, nausea, vomiting, abdominal pain, constipation, diarrhea, blood in his stool or unexpected weight loss.  Patient denies a family history of colon cancer rectal cancer or inflammatory bowel disease. Patient has not had a full colonoscopy

## 2022-04-18 NOTE — CONSULT LETTER
[Dear  ___] : Dear  [unfilled], [Courtesy Letter:] : I had the pleasure of seeing your patient, [unfilled], in my office today. [Please see my note below.] : Please see my note below. [Consult Closing:] : Thank you very much for allowing me to participate in the care of this patient.  If you have any questions, please do not hesitate to contact me. [FreeTextEntry3] : Sincerely,\par \par Roland Fritz MD, Colon and Rectal Surgery\par \par [employer] [facility]\par [address] [place] [address]\par [facility], 3rd Floor\par [place], [place] [address]\par Tel [phone] ext [phone]\par Fax [phone]\par

## 2022-04-20 ENCOUNTER — OUTPATIENT (OUTPATIENT)
Dept: OUTPATIENT SERVICES | Facility: HOSPITAL | Age: 72
LOS: 1 days | Discharge: HOME | End: 2022-04-20
Payer: MEDICARE

## 2022-04-20 ENCOUNTER — RESULT REVIEW (OUTPATIENT)
Age: 72
End: 2022-04-20

## 2022-04-20 ENCOUNTER — APPOINTMENT (OUTPATIENT)
Dept: CARDIOLOGY | Facility: CLINIC | Age: 72
End: 2022-04-20

## 2022-04-20 VITALS — WEIGHT: 180.78 LBS

## 2022-04-20 DIAGNOSIS — Z45.2 ENCOUNTER FOR ADJUSTMENT AND MANAGEMENT OF VASCULAR ACCESS DEVICE: ICD-10-CM

## 2022-04-20 DIAGNOSIS — C50.911 MALIGNANT NEOPLASM OF UNSPECIFIED SITE OF RIGHT FEMALE BREAST: ICD-10-CM

## 2022-04-20 DIAGNOSIS — Z90.49 ACQUIRED ABSENCE OF OTHER SPECIFIED PARTS OF DIGESTIVE TRACT: Chronic | ICD-10-CM

## 2022-04-20 PROCEDURE — 77001 FLUOROGUIDE FOR VEIN DEVICE: CPT | Mod: 26

## 2022-04-20 PROCEDURE — 76937 US GUIDE VASCULAR ACCESS: CPT | Mod: 26

## 2022-04-20 PROCEDURE — 36561 INSERT TUNNELED CV CATH: CPT

## 2022-04-20 NOTE — CHART NOTE - NSCHARTNOTEFT_GEN_A_CORE
PACU ANESTHESIA ADMISSION NOTE      Procedure:   Post op diagnosis:      ____  Intubated  TV:______       Rate: ______      FiO2: ______    __x__  Patent Airway    __x__  Full return of protective reflexes    __x__  Full recovery from anesthesia / back to baseline     Vitals:   See Anesthesia Record      Mental Status:  _x___ Awake   __x___ Alert   _____ Drowsy   _____ Sedated    Nausea/Vomiting:  _x___ NO  ______Yes,   __x__ See Post - Op Orders          Pain Scale (0-10):  __0___    Treatment: ____ None    __x__ See Post - Op/PCA Orders    Post - Operative Fluids:   ____ Oral   __x__ See Post - Op Orders    Plan: Discharge:   _x___Home       _____Floor     _____Critical Care    _____  Other:_________________    Comments: Uneventful anesthesia. Patient transported to PACU awake and responsive, spontaneously breathing and hemodynamically stable. Report given to PACU RN at bedside

## 2022-04-20 NOTE — PROGRESS NOTE ADULT - SUBJECTIVE AND OBJECTIVE BOX
Interventional Radiology Outpatient Documentation    PREOPERATIVE DAY OF PROCEDURE EVALUATION:     I have personally seen and examined this patient. I agree with the history and physical which I have reviewed and noted any changes below:     Plan is for venous access port insertion with monitored anesthesia care.      Procedure/ risks/ benefits/ goals/ alternatives were explained. All questions answered. Informed content obtained from patient. Consent placed in chart.     POSTOPERATIVE PROCEDURAL EVALUATION:     Procedure:  Left chest port insertion    Pre-Op Diagnosis: Right breast cancer    Post-Op Diagnosis: Same    Attending: Fadi  Resident: None    Anesthesia (type):  [ ] General Anesthesia  [ x] Sedation administered by Anesthesia  [ ] Spinal Anesthesia  [ x] Local/Regional    Contrast: None    Estimated Blood Loss: Minimal, < 5 cc    Condition:   [ ] Critical  [ ] Serious  [ ] Fair   [x] Good    Findings/Follow up Plan of Care:  Successful left internal jugular venous access port insertion.  Tip of the catheter is in the RA and ready for use immediately.      See full report in pacs    Complications: None    Disposition: Recovery then d/c home.  Keep dressing clean/dry for 5-7 days and return to clinic in one week.

## 2022-04-21 ENCOUNTER — APPOINTMENT (OUTPATIENT)
Dept: HEMATOLOGY ONCOLOGY | Facility: CLINIC | Age: 72
End: 2022-04-21
Payer: MEDICARE

## 2022-04-21 VITALS
RESPIRATION RATE: 16 BRPM | DIASTOLIC BLOOD PRESSURE: 90 MMHG | SYSTOLIC BLOOD PRESSURE: 180 MMHG | TEMPERATURE: 98.2 F | WEIGHT: 184 LBS | HEIGHT: 66 IN | HEART RATE: 81 BPM | BODY MASS INDEX: 29.57 KG/M2

## 2022-04-21 DIAGNOSIS — Z00.00 ENCOUNTER FOR GENERAL ADULT MEDICAL EXAMINATION W/OUT ABNORMAL FINDINGS: ICD-10-CM

## 2022-04-21 DIAGNOSIS — Z01.818 ENCOUNTER FOR OTHER PREPROCEDURAL EXAMINATION: ICD-10-CM

## 2022-04-21 PROCEDURE — 99215 OFFICE O/P EST HI 40 MIN: CPT

## 2022-04-21 RX ORDER — METRONIDAZOLE 500 MG/1
500 TABLET ORAL
Qty: 6 | Refills: 0 | Status: COMPLETED | COMMUNITY
Start: 2022-02-04 | End: 2022-04-21

## 2022-04-21 RX ORDER — IBUPROFEN 200 MG/1
200 CAPSULE, LIQUID FILLED ORAL
Qty: 30 | Refills: 0 | Status: COMPLETED | COMMUNITY
Start: 2022-02-20 | End: 2022-04-21

## 2022-04-21 RX ORDER — NEOMYCIN SULFATE 500 MG/1
500 TABLET ORAL
Qty: 6 | Refills: 0 | Status: COMPLETED | COMMUNITY
Start: 2022-02-04 | End: 2022-04-21

## 2022-04-25 ENCOUNTER — APPOINTMENT (OUTPATIENT)
Dept: INFUSION THERAPY | Facility: CLINIC | Age: 72
End: 2022-04-25

## 2022-04-25 VITALS — WEIGHT: 186 LBS | BODY MASS INDEX: 30.99 KG/M2 | HEIGHT: 65 IN

## 2022-04-25 RX ORDER — CYCLOPHOSPHAMIDE 100 MG
1145 VIAL (EA) INTRAVENOUS ONCE
Refills: 0 | Status: COMPLETED | OUTPATIENT
Start: 2022-04-25 | End: 2022-04-25

## 2022-04-25 RX ORDER — DOXORUBICIN HYDROCHLORIDE 2 MG/ML
115 INJECTION, SOLUTION INTRAVENOUS ONCE
Refills: 0 | Status: COMPLETED | OUTPATIENT
Start: 2022-04-25 | End: 2022-04-25

## 2022-04-25 RX ORDER — PEGFILGRASTIM-CBQV 6 MG/.6ML
6 INJECTION, SOLUTION SUBCUTANEOUS ONCE
Refills: 0 | Status: COMPLETED | OUTPATIENT
Start: 2022-04-25 | End: 2022-04-25

## 2022-04-25 RX ORDER — DEXAMETHASONE 0.5 MG/5ML
12 ELIXIR ORAL ONCE
Refills: 0 | Status: COMPLETED | OUTPATIENT
Start: 2022-04-25 | End: 2022-04-25

## 2022-04-25 RX ORDER — FOSAPREPITANT DIMEGLUMINE 150 MG/5ML
150 INJECTION, POWDER, LYOPHILIZED, FOR SOLUTION INTRAVENOUS ONCE
Refills: 0 | Status: COMPLETED | OUTPATIENT
Start: 2022-04-25 | End: 2022-04-25

## 2022-04-25 RX ORDER — AMLODIPINE BESYLATE 2.5 MG/1
5 TABLET ORAL ONCE
Refills: 0 | Status: COMPLETED | OUTPATIENT
Start: 2022-04-25 | End: 2022-04-25

## 2022-04-25 RX ADMIN — FOSAPREPITANT DIMEGLUMINE 300 MILLIGRAM(S): 150 INJECTION, POWDER, LYOPHILIZED, FOR SOLUTION INTRAVENOUS at 12:41

## 2022-04-25 RX ADMIN — Medication 307.25 MILLIGRAM(S): at 12:40

## 2022-04-25 RX ADMIN — PEGFILGRASTIM-CBQV 6 MILLIGRAM(S): 6 INJECTION, SOLUTION SUBCUTANEOUS at 12:41

## 2022-04-25 RX ADMIN — DOXORUBICIN HYDROCHLORIDE 690 MILLIGRAM(S): 2 INJECTION, SOLUTION INTRAVENOUS at 12:40

## 2022-04-25 RX ADMIN — AMLODIPINE BESYLATE 5 MILLIGRAM(S): 2.5 TABLET ORAL at 09:48

## 2022-04-25 RX ADMIN — Medication 122 MILLIGRAM(S): at 12:41

## 2022-04-26 ENCOUNTER — APPOINTMENT (OUTPATIENT)
Dept: INTERVENTIONAL RADIOLOGY/VASCULAR | Facility: CLINIC | Age: 72
End: 2022-04-26
Payer: MEDICARE

## 2022-04-26 PROCEDURE — XXXXX: CPT | Mod: 1L

## 2022-04-26 NOTE — PHYSICAL EXAM
[Alert] : alert [No Acute Distress] : no acute distress [Well Nourished] : well nourished [Well Developed] : well developed [Healthy Appearance] : healthy appearance [Normal Sclera/Conjunctiva] : normal sclera/conjunctiva [PERRL] : pupils equal, round and reactive to light [No Respiratory Distress] : no respiratory distress [No Accessory Muscle Use] : no accessory muscle use [Normal PMI] : the apical impulse was normal [No Rash] : no rash [No Skin Lesions] : no skin lesions [Oriented x3] : oriented to person, place, and time [Normal Insight/Judgement] : insight and judgment were intact [Normal Affect] : the affect was normal [Normal Mood] : the mood was normal [Recent Memory Normal] : recent memory was not impaired [Remote Memory Normal] : remote memory was not impaired [Restricted in physically strenuous activity but ambulatory and able to carry out work of a light or sedentary nature] : Restricted in physically strenuous activity but ambulatory and able to carry out work of a light or sedentary nature, e.g., light house work, office work

## 2022-04-29 PROBLEM — Z01.818 EXAMINATION PRIOR TO CHEMOTHERAPY: Status: ACTIVE | Noted: 2022-03-15

## 2022-04-29 NOTE — HISTORY OF PRESENT ILLNESS
[de-identified] : 70 yo female was referred by Dr. Quinonez for consultation of breast cancer. Jesica initially presented to hospital for constipation and abdominal pain. CT a/p on 11/24/21 showed  large bowel obstruction secondary to a likely malignant stricture in the proximal sigmoid colon, small volume ascites and lobulated, partially visualized right breast mass and left breast nodule. Physical exam correlation and correlation with dedicated breast imaging was recommended as malignancy is of concern.\par \par On 11/26/21, CT chest showed right and left breast masses. The right breast mass measured 6.5 x 3.8 x 7.0 cm and contained small calcifications. The left breast mass measured 3.1 x 2.9 x 2.6 cm.\par \par On 11/29/21, colonoscopy revealed sigmoid colon mass and and biopsy showed invasive adenocarcinoma, moderately differentiated. MMR proteins were all expressed. 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 \par \par On 12/21/21, b/l breast dx mammo and US were performed which showed 10 cm mass at the 11 to 1:00 position 3 to 4 cm from the nipple corresponding to the biopsy-proven right breast carcinoma.  In the right axilla, at the 11:00 position 13 cm from the nipple, there is an abnormal lymph node measuring 2.3 x 1.3 x 0.9 cm. Ultrasound-guided biopsy is recommended. In the left breast, there are scattered similar appearing subcentimeter circumscribed masses seen throughout the left breast. These correspond to mammographically demonstrated masses:\par At the 3:00 position 6 cm from the nipple, there is a mass measuring 0.4 x 0.3 x 0.3 cm. Ultrasound-guided biopsy of this mass is recommended.\par At the 7 to 8:00 position 5 cm from the nipple, there is a mass measuring 0.4 x 0.3 x 0.2 cm.\par At the 8 to 9:00 position 4 cm from the nipple, there is a circumscribed mass measuring 0.4 x 0.4 x 0.3 cm.\par At the 8 to 9:00 position 3 cm from nipple, corresponding to area palpable concern and mammographic findings, there is a heterogeneous mass measuring 2.5 x 2.3 x 2.0 cm. Ultrasound-guided biopsy is recommended.\par \par On 12/30/21, left breast 3 N6 mass, ultrasound guided needle core biopsies revealed radial sclerosing lesion (radial scar) and Benign atrophic breast tissue with proliferative type fibrocystic changes. Left breast 8-9 N3 mass, ultrasound guided needle core biopsies showed single minute fragment of benign atrophic breast tissue.  \par The right axillary mass, ultrasound guided needle core biopsies were positive for moderately differentiated adenocarcinoma, most likely representative of metastasis from the patient's known mammary primary carcinoma to an axillary lymph node with mervat replacement, obliteration, and extensive extracapsular extension.\par \par On 1/19/22, left breast relatively posterior calcifications, stereotactic guided vacuum assisted needle core biopsies showed radial sclerosing lesion (radial scar) and benign atrophic breast tissue with proliferative type fibrocystic changes. Left relatively anterior calcifications, stereotactic guided vacuum assisted needle core biopsies showed benign atrophic breast tissue with proliferative type fibrocystic changes.  \par \par On 1/5/22, she had b/l breast MRI which showed 8 cm biopsy-proven right breast index carcinoma and axillary mervat metastasis. Biopsy-proven left breast radial scar with no additional sites of suspicious enhancement.\par \par On 2/17/22, she underwent robotic sigmoidectomy and colostomy. The pathology revealed Invasive moderately differentiated adenocarcinoma (6 cm in greatest dimension), invading through muscularis propria into pericolorectal tissue (pT3), +LVI.  All margins were negative for invasive carcinoma.  Twenty two lymph nodes were negative for metastatic carcinoma. She recovered well from surgery. \par \par She is here today with her  to discuss breast cancer treatment. She has no family h/o breast cancer or any other kinds of cancer.  [de-identified] : 4/21/22:\svitlana Mooney is here today for followup and chemotherapy. She has synchronous colon cancer and breast cancer: invasive moderately differentiated adenocarcinoma of the sigmoid colon, MMR proficient, s/p robotic sigmoidectomy and colostomy. 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 Clinical stage is cT3N1. Her staging CT and bone scan did not show evidence of distant mets. She is here today to start neoadjuvant chemotherapy for breast cancer.

## 2022-04-29 NOTE — HISTORY OF PRESENT ILLNESS
[de-identified] : 70 yo female was referred by Dr. Quinonez for consultation of breast cancer. Jesica initially presented to hospital for constipation and abdominal pain. CT a/p on 11/24/21 showed  large bowel obstruction secondary to a likely malignant stricture in the proximal sigmoid colon, small volume ascites and lobulated, partially visualized right breast mass and left breast nodule. Physical exam correlation and correlation with dedicated breast imaging was recommended as malignancy is of concern.\par \par On 11/26/21, CT chest showed right and left breast masses. The right breast mass measured 6.5 x 3.8 x 7.0 cm and contained small calcifications. The left breast mass measured 3.1 x 2.9 x 2.6 cm.\par \par On 11/29/21, colonoscopy revealed sigmoid colon mass and and biopsy showed invasive adenocarcinoma, moderately differentiated. MMR proteins were all expressed. 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 \par \par On 12/21/21, b/l breast dx mammo and US were performed which showed 10 cm mass at the 11 to 1:00 position 3 to 4 cm from the nipple corresponding to the biopsy-proven right breast carcinoma.  In the right axilla, at the 11:00 position 13 cm from the nipple, there is an abnormal lymph node measuring 2.3 x 1.3 x 0.9 cm. Ultrasound-guided biopsy is recommended. In the left breast, there are scattered similar appearing subcentimeter circumscribed masses seen throughout the left breast. These correspond to mammographically demonstrated masses:\par At the 3:00 position 6 cm from the nipple, there is a mass measuring 0.4 x 0.3 x 0.3 cm. Ultrasound-guided biopsy of this mass is recommended.\par At the 7 to 8:00 position 5 cm from the nipple, there is a mass measuring 0.4 x 0.3 x 0.2 cm.\par At the 8 to 9:00 position 4 cm from the nipple, there is a circumscribed mass measuring 0.4 x 0.4 x 0.3 cm.\par At the 8 to 9:00 position 3 cm from nipple, corresponding to area palpable concern and mammographic findings, there is a heterogeneous mass measuring 2.5 x 2.3 x 2.0 cm. Ultrasound-guided biopsy is recommended.\par \par On 12/30/21, left breast 3 N6 mass, ultrasound guided needle core biopsies revealed radial sclerosing lesion (radial scar) and Benign atrophic breast tissue with proliferative type fibrocystic changes. Left breast 8-9 N3 mass, ultrasound guided needle core biopsies showed single minute fragment of benign atrophic breast tissue.  \par The right axillary mass, ultrasound guided needle core biopsies were positive for moderately differentiated adenocarcinoma, most likely representative of metastasis from the patient's known mammary primary carcinoma to an axillary lymph node with mervat replacement, obliteration, and extensive extracapsular extension.\par \par On 1/19/22, left breast relatively posterior calcifications, stereotactic guided vacuum assisted needle core biopsies showed radial sclerosing lesion (radial scar) and benign atrophic breast tissue with proliferative type fibrocystic changes. Left relatively anterior calcifications, stereotactic guided vacuum assisted needle core biopsies showed benign atrophic breast tissue with proliferative type fibrocystic changes.  \par \par On 1/5/22, she had b/l breast MRI which showed 8 cm biopsy-proven right breast index carcinoma and axillary mervat metastasis. Biopsy-proven left breast radial scar with no additional sites of suspicious enhancement.\par \par On 2/17/22, she underwent robotic sigmoidectomy and colostomy. The pathology revealed Invasive moderately differentiated adenocarcinoma (6 cm in greatest dimension), invading through muscularis propria into pericolorectal tissue (pT3), +LVI.  All margins were negative for invasive carcinoma.  Twenty two lymph nodes were negative for metastatic carcinoma. She recovered well from surgery. \par \par She is here today with her  to discuss breast cancer treatment. She has no family h/o breast cancer or any other kinds of cancer.  [de-identified] : 4/21/22:\svitlana Mooney is here today for followup and chemotherapy. She has synchronous colon cancer and breast cancer: invasive moderately differentiated adenocarcinoma of the sigmoid colon, MMR proficient, s/p robotic sigmoidectomy and colostomy. 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 Clinical stage is cT3N1. Her staging CT and bone scan did not show evidence of distant mets. She is here today to start neoadjuvant chemotherapy for breast cancer.

## 2022-04-29 NOTE — PHYSICAL EXAM
[Fully active, able to carry on all pre-disease performance without restriction] : Status 0 - Fully active, able to carry on all pre-disease performance without restriction [Normal] : affect appropriate [de-identified] : There is large protruding mass about 8 cm in the right breast 12:00 3-7 cm from the nipple. There is no nipple retraction, no overlying skin change. The right axillary lymph node is not palpable.  There is no palpable mass in the left breast and no left axillary adenopathy.

## 2022-04-29 NOTE — CONSULT LETTER
[Dear  ___] : Dear  [unfilled], [Please see my note below.] : Please see my note below. [Sincerely,] : Sincerely, [DrLexis  ___] : Dr. SALES [Courtesy Letter:] : I had the pleasure of seeing your patient, [unfilled], in my office today. [FreeTextEntry3] : Yogesh Daniel MD

## 2022-04-29 NOTE — ASSESSMENT
[FreeTextEntry1] : 72 yo female has synchronous colon cancer and breast cancer:\par - Invasive moderately differentiated adenocarcinoma of the sigmoid colon, MMR proficient, s/p robotic sigmoidectomy and colostomy. 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 Clinical stage is cT3N1.\par \par Assessment and Plan:\par \par #  Invasive well to moderately differentiated ductal carcinoma of the right breast, ER positive, SC and Her-2 negative, clinical stage cT3N1.\par -- We discussed neoadjuvant chemotherapy with dose dense AC every 2 weeks x 4 followed by Taxol weekly x 12 is recommended. Neulasta injection is needed after dose dense AC to prevent neutropenia. We reviewed potential side effects and toxicities from the chemotherapy. Adriamycin is associated with cardiac toxicity, may cause cardiomyopathy and decreased heart function. It has a small risk for developing leukemia and bone marrow disorder. The chemotherapy can cause bone marrow suppression, cytopenia, increase risk of infection, nausea, vomiting, diarrhea, fatigue, alopecia, infusional reaction, and neuropathy. \par -- Will give Neulasta on body after chemo. \par -- She had 2D Echo. LVEF is normal 55-60%.\par -- Port catheter was placed by IR. The site is dry and clean, can be used.\par -- In light of estrogen receptor positive breast cancer, she will be benefit with adjuvant endocrine therapy. Letrozole is recommended. She will start after completion of chemotherapy.\par All questions were answered appropriately. She agreed with the plan. \par \par # IIA ([id number]) adenocarcinoma of the sigmoid colon, MMR proficient, s/p robotic sigmoidectomy. \par -- The pathology report reviewed. She has a couple of high risk factors including bowel obstruction and positive lymphovascular invasion but 22 lymph node were negative. Benefit adjuvant adjuvant chemotherapy is likely to be small and she may forgo it. In addition, she needs to move onto her breast cancer treatment. \par -- Should have surveillance colonoscopy in 6 to 12 months after surgery. \par

## 2022-04-29 NOTE — ASSESSMENT
[FreeTextEntry1] : 70 yo female has synchronous colon cancer and breast cancer:\par - Invasive moderately differentiated adenocarcinoma of the sigmoid colon, MMR proficient, s/p robotic sigmoidectomy and colostomy. 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 Clinical stage is cT3N1.\par \par Assessment and Plan:\par \par #  Invasive well to moderately differentiated ductal carcinoma of the right breast, ER positive, OH and Her-2 negative, clinical stage cT3N1.\par -- We discussed neoadjuvant chemotherapy with dose dense AC every 2 weeks x 4 followed by Taxol weekly x 12 is recommended. Neulasta injection is needed after dose dense AC to prevent neutropenia. We reviewed potential side effects and toxicities from the chemotherapy. Adriamycin is associated with cardiac toxicity, may cause cardiomyopathy and decreased heart function. It has a small risk for developing leukemia and bone marrow disorder. The chemotherapy can cause bone marrow suppression, cytopenia, increase risk of infection, nausea, vomiting, diarrhea, fatigue, alopecia, infusional reaction, and neuropathy. \par -- Will give Neulasta on body after chemo. \par -- She had 2D Echo. LVEF is normal 55-60%.\par -- Port catheter was placed by IR. The site is dry and clean, can be used.\par -- In light of estrogen receptor positive breast cancer, she will be benefit with adjuvant endocrine therapy. Letrozole is recommended. She will start after completion of chemotherapy.\par All questions were answered appropriately. She agreed with the plan. \par \par # IIA ([id number]) adenocarcinoma of the sigmoid colon, MMR proficient, s/p robotic sigmoidectomy. \par -- The pathology report reviewed. She has a couple of high risk factors including bowel obstruction and positive lymphovascular invasion but 22 lymph node were negative. Benefit adjuvant adjuvant chemotherapy is likely to be small and she may forgo it. In addition, she needs to move onto her breast cancer treatment. \par -- Should have surveillance colonoscopy in 6 to 12 months after surgery. \par

## 2022-04-29 NOTE — PHYSICAL EXAM
[Fully active, able to carry on all pre-disease performance without restriction] : Status 0 - Fully active, able to carry on all pre-disease performance without restriction [Normal] : affect appropriate [de-identified] : There is large protruding mass about 8 cm in the right breast 12:00 3-7 cm from the nipple. There is no nipple retraction, no overlying skin change. The right axillary lymph node is not palpable.  There is no palpable mass in the left breast and no left axillary adenopathy.

## 2022-04-29 NOTE — REASON FOR VISIT
[Follow-Up Visit] : a follow-up [Initial Consultation] : an initial consultation [FreeTextEntry2] : right breast cancer.

## 2022-05-09 ENCOUNTER — APPOINTMENT (OUTPATIENT)
Dept: INFUSION THERAPY | Facility: CLINIC | Age: 72
End: 2022-05-09

## 2022-05-09 ENCOUNTER — APPOINTMENT (OUTPATIENT)
Dept: HEMATOLOGY ONCOLOGY | Facility: CLINIC | Age: 72
End: 2022-05-09
Payer: MEDICARE

## 2022-05-09 ENCOUNTER — LABORATORY RESULT (OUTPATIENT)
Age: 72
End: 2022-05-09

## 2022-05-09 VITALS
RESPIRATION RATE: 18 BRPM | WEIGHT: 186 LBS | SYSTOLIC BLOOD PRESSURE: 160 MMHG | HEIGHT: 65 IN | TEMPERATURE: 98.6 F | DIASTOLIC BLOOD PRESSURE: 98 MMHG | BODY MASS INDEX: 30.99 KG/M2

## 2022-05-09 PROCEDURE — 99214 OFFICE O/P EST MOD 30 MIN: CPT

## 2022-05-09 RX ORDER — DEXAMETHASONE 0.5 MG/5ML
12 ELIXIR ORAL ONCE
Refills: 0 | Status: COMPLETED | OUTPATIENT
Start: 2022-05-09 | End: 2022-05-09

## 2022-05-09 RX ORDER — FOSAPREPITANT DIMEGLUMINE 150 MG/5ML
150 INJECTION, POWDER, LYOPHILIZED, FOR SOLUTION INTRAVENOUS ONCE
Refills: 0 | Status: COMPLETED | OUTPATIENT
Start: 2022-05-09 | End: 2022-05-09

## 2022-05-09 RX ORDER — PEGFILGRASTIM-CBQV 6 MG/.6ML
6 INJECTION, SOLUTION SUBCUTANEOUS ONCE
Refills: 0 | Status: COMPLETED | OUTPATIENT
Start: 2022-05-09 | End: 2022-05-09

## 2022-05-09 RX ORDER — CYCLOPHOSPHAMIDE 100 MG
1145 VIAL (EA) INTRAVENOUS ONCE
Refills: 0 | Status: COMPLETED | OUTPATIENT
Start: 2022-05-09 | End: 2022-05-09

## 2022-05-09 RX ORDER — DOXORUBICIN HYDROCHLORIDE 2 MG/ML
115 INJECTION, SOLUTION INTRAVENOUS ONCE
Refills: 0 | Status: COMPLETED | OUTPATIENT
Start: 2022-05-09 | End: 2022-05-09

## 2022-05-09 RX ADMIN — Medication 1145 MILLIGRAM(S): at 14:00

## 2022-05-09 RX ADMIN — PEGFILGRASTIM-CBQV 6 MILLIGRAM(S): 6 INJECTION, SOLUTION SUBCUTANEOUS at 13:56

## 2022-05-09 RX ADMIN — Medication 12 MILLIGRAM(S): at 11:30

## 2022-05-09 RX ADMIN — DOXORUBICIN HYDROCHLORIDE 690 MILLIGRAM(S): 2 INJECTION, SOLUTION INTRAVENOUS at 12:50

## 2022-05-09 RX ADMIN — Medication 307.25 MILLIGRAM(S): at 13:00

## 2022-05-09 RX ADMIN — FOSAPREPITANT DIMEGLUMINE 150 MILLIGRAM(S): 150 INJECTION, POWDER, LYOPHILIZED, FOR SOLUTION INTRAVENOUS at 11:10

## 2022-05-09 RX ADMIN — Medication 122 MILLIGRAM(S): at 11:15

## 2022-05-09 RX ADMIN — FOSAPREPITANT DIMEGLUMINE 300 MILLIGRAM(S): 150 INJECTION, POWDER, LYOPHILIZED, FOR SOLUTION INTRAVENOUS at 10:50

## 2022-05-09 NOTE — PHYSICAL EXAM
[Fully active, able to carry on all pre-disease performance without restriction] : Status 0 - Fully active, able to carry on all pre-disease performance without restriction [Normal] : affect appropriate [de-identified] : There is large protruding mass about 8 cm in the right breast 12:00 3-7 cm from the nipple. There is no nipple retraction, no overlying skin change. The right axillary lymph node is not palpable.  There is no palpable mass in the left breast and no left axillary adenopathy.

## 2022-05-09 NOTE — CONSULT LETTER
[Dear  ___] : Dear  [unfilled], [Courtesy Letter:] : I had the pleasure of seeing your patient, [unfilled], in my office today. [Please see my note below.] : Please see my note below. [Sincerely,] : Sincerely, [FreeTextEntry3] : Yogesh Daniel MD [DrLexis  ___] : Dr. SALES

## 2022-05-09 NOTE — HISTORY OF PRESENT ILLNESS
[de-identified] : 70 yo female was referred by Dr. Quinonez for consultation of breast cancer. Jesica initially presented to hospital for constipation and abdominal pain. CT a/p on 11/24/21 showed  large bowel obstruction secondary to a likely malignant stricture in the proximal sigmoid colon, small volume ascites and lobulated, partially visualized right breast mass and left breast nodule. Physical exam correlation and correlation with dedicated breast imaging was recommended as malignancy is of concern.\par \par On 11/26/21, CT chest showed right and left breast masses. The right breast mass measured 6.5 x 3.8 x 7.0 cm and contained small calcifications. The left breast mass measured 3.1 x 2.9 x 2.6 cm.\par \par On 11/29/21, colonoscopy revealed sigmoid colon mass and and biopsy showed invasive adenocarcinoma, moderately differentiated. MMR proteins were all expressed. 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 \par \par On 12/21/21, b/l breast dx mammo and US were performed which showed 10 cm mass at the 11 to 1:00 position 3 to 4 cm from the nipple corresponding to the biopsy-proven right breast carcinoma.  In the right axilla, at the 11:00 position 13 cm from the nipple, there is an abnormal lymph node measuring 2.3 x 1.3 x 0.9 cm. Ultrasound-guided biopsy is recommended. In the left breast, there are scattered similar appearing subcentimeter circumscribed masses seen throughout the left breast. These correspond to mammographically demonstrated masses:\par At the 3:00 position 6 cm from the nipple, there is a mass measuring 0.4 x 0.3 x 0.3 cm. Ultrasound-guided biopsy of this mass is recommended.\par At the 7 to 8:00 position 5 cm from the nipple, there is a mass measuring 0.4 x 0.3 x 0.2 cm.\par At the 8 to 9:00 position 4 cm from the nipple, there is a circumscribed mass measuring 0.4 x 0.4 x 0.3 cm.\par At the 8 to 9:00 position 3 cm from nipple, corresponding to area palpable concern and mammographic findings, there is a heterogeneous mass measuring 2.5 x 2.3 x 2.0 cm. Ultrasound-guided biopsy is recommended.\par \par On 12/30/21, left breast 3 N6 mass, ultrasound guided needle core biopsies revealed radial sclerosing lesion (radial scar) and Benign atrophic breast tissue with proliferative type fibrocystic changes. Left breast 8-9 N3 mass, ultrasound guided needle core biopsies showed single minute fragment of benign atrophic breast tissue.  \par The right axillary mass, ultrasound guided needle core biopsies were positive for moderately differentiated adenocarcinoma, most likely representative of metastasis from the patient's known mammary primary carcinoma to an axillary lymph node with mervat replacement, obliteration, and extensive extracapsular extension.\par \par On 1/19/22, left breast relatively posterior calcifications, stereotactic guided vacuum assisted needle core biopsies showed radial sclerosing lesion (radial scar) and benign atrophic breast tissue with proliferative type fibrocystic changes. Left relatively anterior calcifications, stereotactic guided vacuum assisted needle core biopsies showed benign atrophic breast tissue with proliferative type fibrocystic changes.  \par \par On 1/5/22, she had b/l breast MRI which showed 8 cm biopsy-proven right breast index carcinoma and axillary mervat metastasis. Biopsy-proven left breast radial scar with no additional sites of suspicious enhancement.\par \par On 2/17/22, she underwent robotic sigmoidectomy and colostomy. The pathology revealed Invasive moderately differentiated adenocarcinoma (6 cm in greatest dimension), invading through muscularis propria into pericolorectal tissue (pT3), +LVI.  All margins were negative for invasive carcinoma.  Twenty two lymph nodes were negative for metastatic carcinoma. She recovered well from surgery. \par \par She is here today with her  to discuss breast cancer treatment. She has no family h/o breast cancer or any other kinds of cancer.  [de-identified] : 4/21/22:\svitlana Mooney is here today for followup and chemotherapy. She has synchronous colon cancer and breast cancer: invasive moderately differentiated adenocarcinoma of the sigmoid colon, MMR proficient, s/p robotic sigmoidectomy and colostomy. 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 Clinical stage is cT3N1. Her staging CT and bone scan did not show evidence of distant mets. She is here today to start neoadjuvant chemotherapy for breast cancer. \par \par 5/9/22:\svitlana Mooney is here today for followup and chemotherapy. She has synchronous colon cancer and breast cancer: invasive moderately differentiated adenocarcinoma of the sigmoid colon, MMR proficient, s/p robotic sigmoidectomy and colostomy. 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 Clinical stage is cT3N1. Her staging CT and bone scan did not show evidence of distant mets. She started on neoadjuvant chemotherapy for breast cancer. She had 1st cycle AC 2 weeks ago and tolerated well.  37.1

## 2022-05-09 NOTE — ASSESSMENT
[FreeTextEntry1] : 70 yo female has synchronous colon cancer and breast cancer:\par - Invasive moderately differentiated adenocarcinoma of the sigmoid colon, MMR proficient, s/p robotic sigmoidectomy and colostomy. The pathologic stage is IIA ([id number]).\par - Invasive well to moderately differentiated ductal carcinoma of the right breast with dominant mucinous features (colloid carcinoma), ER pos %, HI negative, Her-2 negative. Clinical stage is cT3N1.\par \par Assessment and Plan:\par \par #  Invasive well to moderately differentiated ductal carcinoma of the right breast, ER positive, HI and Her-2 negative, clinical stage cT3N1.\par -- Will proceed with 2nd cycle chemo with AC. CBC reviewed. Blood counts are adequate.\par -- Will give Neulasta on body after chemo. \par -- Will order blood work: BMP and LFT before chemo.\par -- 2D Echo. LVEF is normal 55-60%.\par -- Port catheter site is dry and clean.\par -- In light of estrogen receptor positive breast cancer, she will be benefit with adjuvant endocrine therapy. Letrozole is recommended. She will start after completion of chemotherapy.\par \par # IIA ([id number]) adenocarcinoma of the sigmoid colon, MMR proficient, s/p robotic sigmoidectomy. \par -- The pathology report reviewed. She has a couple of high risk factors including bowel obstruction and positive lymphovascular invasion but 22 lymph node were negative. Benefit adjuvant adjuvant chemotherapy is likely to be small and she may forgo it. In addition, she needs to move onto her breast cancer treatment. \par -- Should have surveillance colonoscopy in 6 to 12 months after surgery. \par

## 2022-05-11 ENCOUNTER — NON-APPOINTMENT (OUTPATIENT)
Age: 72
End: 2022-05-11

## 2022-05-23 ENCOUNTER — APPOINTMENT (OUTPATIENT)
Dept: INFUSION THERAPY | Facility: CLINIC | Age: 72
End: 2022-05-23
Payer: MEDICARE

## 2022-05-23 ENCOUNTER — LABORATORY RESULT (OUTPATIENT)
Age: 72
End: 2022-05-23

## 2022-05-23 ENCOUNTER — APPOINTMENT (OUTPATIENT)
Dept: HEMATOLOGY ONCOLOGY | Facility: CLINIC | Age: 72
End: 2022-05-23
Payer: MEDICARE

## 2022-05-23 VITALS — BODY MASS INDEX: 30.66 KG/M2 | HEIGHT: 65 IN | HEART RATE: 104 BPM | WEIGHT: 184 LBS | TEMPERATURE: 97 F

## 2022-05-23 VITALS — DIASTOLIC BLOOD PRESSURE: 80 MMHG | SYSTOLIC BLOOD PRESSURE: 140 MMHG

## 2022-05-23 LAB
ANION GAP SERPL CALC-SCNC: 12 MMOL/L
BUN SERPL-MCNC: 24 MG/DL
CALCIUM SERPL-MCNC: 9.3 MG/DL
CHLORIDE SERPL-SCNC: 104 MMOL/L
CO2 SERPL-SCNC: 27 MMOL/L
CREAT SERPL-MCNC: 0.7 MG/DL
EGFR: 92 ML/MIN/1.73M2
GLUCOSE SERPL-MCNC: 116 MG/DL
HCT VFR BLD CALC: 39.8 %
HGB BLD-MCNC: 13.6 G/DL
MCHC RBC-ENTMCNC: 29.3 PG
MCHC RBC-ENTMCNC: 34.2 G/DL
MCV RBC AUTO: 85.8 FL
PLATELET # BLD AUTO: 110 K/UL
PMV BLD: 11.7 FL
POTASSIUM SERPL-SCNC: 4.1 MMOL/L
RBC # BLD: 4.64 M/UL
RBC # FLD: 14 %
SODIUM SERPL-SCNC: 143 MMOL/L
WBC # FLD AUTO: 14.03 K/UL

## 2022-05-23 PROCEDURE — 99215 OFFICE O/P EST HI 40 MIN: CPT

## 2022-05-23 RX ORDER — DOXORUBICIN HYDROCHLORIDE 2 MG/ML
115 INJECTION, SOLUTION INTRAVENOUS ONCE
Refills: 0 | Status: COMPLETED | OUTPATIENT
Start: 2022-05-23 | End: 2022-05-23

## 2022-05-23 RX ORDER — FOSAPREPITANT DIMEGLUMINE 150 MG/5ML
150 INJECTION, POWDER, LYOPHILIZED, FOR SOLUTION INTRAVENOUS ONCE
Refills: 0 | Status: COMPLETED | OUTPATIENT
Start: 2022-05-23 | End: 2022-05-23

## 2022-05-23 RX ORDER — CYCLOPHOSPHAMIDE 100 MG
1145 VIAL (EA) INTRAVENOUS ONCE
Refills: 0 | Status: COMPLETED | OUTPATIENT
Start: 2022-05-23 | End: 2022-05-23

## 2022-05-23 RX ORDER — PEGFILGRASTIM-CBQV 6 MG/.6ML
6 INJECTION, SOLUTION SUBCUTANEOUS ONCE
Refills: 0 | Status: COMPLETED | OUTPATIENT
Start: 2022-05-23 | End: 2022-05-23

## 2022-05-23 RX ORDER — DEXAMETHASONE 0.5 MG/5ML
12 ELIXIR ORAL ONCE
Refills: 0 | Status: COMPLETED | OUTPATIENT
Start: 2022-05-23 | End: 2022-05-23

## 2022-05-23 RX ADMIN — FOSAPREPITANT DIMEGLUMINE 150 MILLIGRAM(S): 150 INJECTION, POWDER, LYOPHILIZED, FOR SOLUTION INTRAVENOUS at 10:45

## 2022-05-23 RX ADMIN — FOSAPREPITANT DIMEGLUMINE 300 MILLIGRAM(S): 150 INJECTION, POWDER, LYOPHILIZED, FOR SOLUTION INTRAVENOUS at 10:13

## 2022-05-23 RX ADMIN — DOXORUBICIN HYDROCHLORIDE 690 MILLIGRAM(S): 2 INJECTION, SOLUTION INTRAVENOUS at 11:25

## 2022-05-23 RX ADMIN — Medication 307.25 MILLIGRAM(S): at 11:45

## 2022-05-23 RX ADMIN — Medication 1145 MILLIGRAM(S): at 12:45

## 2022-05-23 RX ADMIN — Medication 12 MILLIGRAM(S): at 11:10

## 2022-05-23 RX ADMIN — PEGFILGRASTIM-CBQV 6 MILLIGRAM(S): 6 INJECTION, SOLUTION SUBCUTANEOUS at 11:55

## 2022-05-23 RX ADMIN — Medication 122 MILLIGRAM(S): at 10:48

## 2022-05-23 NOTE — PHYSICAL EXAM
[Fully active, able to carry on all pre-disease performance without restriction] : Status 0 - Fully active, able to carry on all pre-disease performance without restriction [Normal] : affect appropriate [de-identified] : Large protruding mass about 8 cm in the right breast 12:00 3-7 cm from the nipple. There is no nipple retraction, no overlying skin change. The right axillary lymph node is not palpable.  There is no palpable mass in the left breast and no left axillary adenopathy.

## 2022-05-23 NOTE — HISTORY OF PRESENT ILLNESS
[de-identified] : 72 yo female was referred by Dr. Quinonez for consultation of breast cancer. Jesica initially presented to hospital for constipation and abdominal pain. CT a/p on 11/24/21 showed  large bowel obstruction secondary to a likely malignant stricture in the proximal sigmoid colon, small volume ascites and lobulated, partially visualized right breast mass and left breast nodule. Physical exam correlation and correlation with dedicated breast imaging was recommended as malignancy is of concern.\par \par On 11/26/21, CT chest showed right and left breast masses. The right breast mass measured 6.5 x 3.8 x 7.0 cm and contained small calcifications. The left breast mass measured 3.1 x 2.9 x 2.6 cm.\par \par On 11/29/21, colonoscopy revealed sigmoid colon mass and and biopsy showed invasive adenocarcinoma, moderately differentiated. MMR proteins were all expressed. 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 \par \par On 12/21/21, b/l breast dx mammo and US were performed which showed 10 cm mass at the 11 to 1:00 position 3 to 4 cm from the nipple corresponding to the biopsy-proven right breast carcinoma.  In the right axilla, at the 11:00 position 13 cm from the nipple, there is an abnormal lymph node measuring 2.3 x 1.3 x 0.9 cm. Ultrasound-guided biopsy is recommended. In the left breast, there are scattered similar appearing subcentimeter circumscribed masses seen throughout the left breast. These correspond to mammographically demonstrated masses:\par At the 3:00 position 6 cm from the nipple, there is a mass measuring 0.4 x 0.3 x 0.3 cm. Ultrasound-guided biopsy of this mass is recommended.\par At the 7 to 8:00 position 5 cm from the nipple, there is a mass measuring 0.4 x 0.3 x 0.2 cm.\par At the 8 to 9:00 position 4 cm from the nipple, there is a circumscribed mass measuring 0.4 x 0.4 x 0.3 cm.\par At the 8 to 9:00 position 3 cm from nipple, corresponding to area palpable concern and mammographic findings, there is a heterogeneous mass measuring 2.5 x 2.3 x 2.0 cm. Ultrasound-guided biopsy is recommended.\par \par On 12/30/21, left breast 3 N6 mass, ultrasound guided needle core biopsies revealed radial sclerosing lesion (radial scar) and Benign atrophic breast tissue with proliferative type fibrocystic changes. Left breast 8-9 N3 mass, ultrasound guided needle core biopsies showed single minute fragment of benign atrophic breast tissue.  \par The right axillary mass, ultrasound guided needle core biopsies were positive for moderately differentiated adenocarcinoma, most likely representative of metastasis from the patient's known mammary primary carcinoma to an axillary lymph node with mervat replacement, obliteration, and extensive extracapsular extension.\par \par On 1/19/22, left breast relatively posterior calcifications, stereotactic guided vacuum assisted needle core biopsies showed radial sclerosing lesion (radial scar) and benign atrophic breast tissue with proliferative type fibrocystic changes. Left relatively anterior calcifications, stereotactic guided vacuum assisted needle core biopsies showed benign atrophic breast tissue with proliferative type fibrocystic changes.  \par \par On 1/5/22, she had b/l breast MRI which showed 8 cm biopsy-proven right breast index carcinoma and axillary mervat metastasis. Biopsy-proven left breast radial scar with no additional sites of suspicious enhancement.\par \par On 2/17/22, she underwent robotic sigmoidectomy and colostomy. The pathology revealed Invasive moderately differentiated adenocarcinoma (6 cm in greatest dimension), invading through muscularis propria into pericolorectal tissue (pT3), +LVI.  All margins were negative for invasive carcinoma.  Twenty two lymph nodes were negative for metastatic carcinoma. She recovered well from surgery. \par \par She is here today with her  to discuss breast cancer treatment. She has no family h/o breast cancer or any other kinds of cancer.  [de-identified] : 4/21/22:\svitlana Mooney is here today for followup and chemotherapy. She has synchronous colon cancer and breast cancer: invasive moderately differentiated adenocarcinoma of the sigmoid colon, MMR proficient, s/p robotic sigmoidectomy and colostomy. 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 Clinical stage is cT3N1. Her staging CT and bone scan did not show evidence of distant mets. She is here today to start neoadjuvant chemotherapy for breast cancer. \par \par 5/9/22:susanne Mooney is here today for followup and chemotherapy. She has synchronous colon cancer and breast cancer: invasive moderately differentiated adenocarcinoma of the sigmoid colon, MMR proficient, s/p robotic sigmoidectomy and colostomy. 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 Clinical stage is cT3N1. Her staging CT and bone scan did not show evidence of distant mets. She started on neoadjuvant chemotherapy for breast cancer. She had 1st cycle AC 2 weeks ago and tolerated well. \par \par 5/23/22susanne Mooney is here today for followup and chemotherapy. She has synchronous colon cancer and breast cancer: invasive moderately differentiated adenocarcinoma of the sigmoid colon, MMR proficient, s/p robotic sigmoidectomy and colostomy. 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 Clinical stage is cT3N1. Her staging CT and bone scan did not show evidence of distant mets. She started on neoadjuvant chemotherapy. To date, she has had 2 cycles and tolerated well. She did not have fever, mouth sore, vomiting or diarrhea.

## 2022-05-23 NOTE — ASSESSMENT
[FreeTextEntry1] : 72 yo female has synchronous colon cancer and breast cancer:\par - Invasive moderately differentiated adenocarcinoma of the sigmoid colon, MMR proficient, s/p robotic sigmoidectomy and colostomy. 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 Clinical stage is cT3N1.\par \par Assessment and Plan:\par \par #  Invasive well to moderately differentiated ductal carcinoma of the right breast, ER positive, LA and Her-2 negative, clinical stage cT3N1.\par -- Will proceed with 3rd cycle chemo with AC. CBC reviewed. She has some thrombocytopenia with PLT count 110K secondary to chemotherapy. Will monitor. \par -- Will give Neulasta on body after chemo. \par -- Will order blood work: BMP and LFT before chemo.\par -- 2D Echo. LVEF is normal 55-60%.\par -- Port catheter site is dry and clean.\par -- In light of estrogen receptor positive breast cancer, she will be benefit with adjuvant endocrine therapy. Letrozole is recommended. She will start after completion of chemotherapy.\par \par # IIA ([id number]) adenocarcinoma of the sigmoid colon, MMR proficient, s/p robotic sigmoidectomy. \par -- The pathology report reviewed. She has a couple of high risk factors including bowel obstruction and positive lymphovascular invasion but 22 lymph node were negative. Benefit adjuvant adjuvant chemotherapy is likely to be small and she may forgo it. In addition, she needs to move onto her breast cancer treatment. \par -- Should have surveillance colonoscopy in 6 to 12 months after surgery. \par

## 2022-05-23 NOTE — CONSULT LETTER
[Dear  ___] : Dear  [unfilled], [Courtesy Letter:] : I had the pleasure of seeing your patient, [unfilled], in my office today. [Please see my note below.] : Please see my note below. [Sincerely,] : Sincerely, [DrLexis  ___] : Dr. SALES [FreeTextEntry3] : Yogesh Daniel MD

## 2022-06-06 ENCOUNTER — APPOINTMENT (OUTPATIENT)
Dept: INFUSION THERAPY | Facility: CLINIC | Age: 72
End: 2022-06-06
Payer: MEDICARE

## 2022-06-06 ENCOUNTER — APPOINTMENT (OUTPATIENT)
Dept: HEMATOLOGY ONCOLOGY | Facility: CLINIC | Age: 72
End: 2022-06-06
Payer: MEDICARE

## 2022-06-06 ENCOUNTER — LABORATORY RESULT (OUTPATIENT)
Age: 72
End: 2022-06-06

## 2022-06-06 VITALS
DIASTOLIC BLOOD PRESSURE: 80 MMHG | HEART RATE: 90 BPM | SYSTOLIC BLOOD PRESSURE: 150 MMHG | TEMPERATURE: 98 F | WEIGHT: 183 LBS | RESPIRATION RATE: 19 BRPM | HEIGHT: 65 IN | BODY MASS INDEX: 30.49 KG/M2

## 2022-06-06 LAB
ALBUMIN SERPL ELPH-MCNC: 4.2 G/DL
ALBUMIN SERPL ELPH-MCNC: 4.5 G/DL
ALBUMIN SERPL ELPH-MCNC: 4.5 G/DL
ALP BLD-CCNC: 100 U/L
ALP BLD-CCNC: 106 U/L
ALP BLD-CCNC: 78 U/L
ALT SERPL-CCNC: 10 U/L
ALT SERPL-CCNC: 8 U/L
ALT SERPL-CCNC: 9 U/L
ANION GAP SERPL CALC-SCNC: 12 MMOL/L
ANION GAP SERPL CALC-SCNC: 12 MMOL/L
AST SERPL-CCNC: 12 U/L
BILIRUB DIRECT SERPL-MCNC: <0.2 MG/DL
BILIRUB INDIRECT SERPL-MCNC: >0.4 MG/DL
BILIRUB INDIRECT SERPL-MCNC: NORMAL MG/DL
BILIRUB INDIRECT SERPL-MCNC: NORMAL MG/DL
BILIRUB SERPL-MCNC: 0.3 MG/DL
BILIRUB SERPL-MCNC: 0.3 MG/DL
BILIRUB SERPL-MCNC: 0.6 MG/DL
BUN SERPL-MCNC: 16 MG/DL
BUN SERPL-MCNC: 17 MG/DL
CALCIUM SERPL-MCNC: 9.1 MG/DL
CALCIUM SERPL-MCNC: 9.5 MG/DL
CANCER AG15-3 SERPL-ACNC: 26.2 U/ML
CHLORIDE SERPL-SCNC: 102 MMOL/L
CHLORIDE SERPL-SCNC: 103 MMOL/L
CO2 SERPL-SCNC: 26 MMOL/L
CO2 SERPL-SCNC: 26 MMOL/L
CREAT SERPL-MCNC: 0.8 MG/DL
CREAT SERPL-MCNC: 0.9 MG/DL
EGFR: 68 ML/MIN/1.73M2
EGFR: 79 ML/MIN/1.73M2
GLUCOSE SERPL-MCNC: 113 MG/DL
GLUCOSE SERPL-MCNC: 93 MG/DL
HCT VFR BLD CALC: 40.8 %
HGB BLD-MCNC: 14.3 G/DL
MCHC RBC-ENTMCNC: 29.8 PG
MCHC RBC-ENTMCNC: 35 G/DL
MCV RBC AUTO: 85 FL
PLATELET # BLD AUTO: 226 K/UL
PMV BLD: 10.8 FL
POTASSIUM SERPL-SCNC: 3.9 MMOL/L
POTASSIUM SERPL-SCNC: 4.4 MMOL/L
PROT SERPL-MCNC: 6.4 G/DL
PROT SERPL-MCNC: 6.5 G/DL
PROT SERPL-MCNC: 6.6 G/DL
RBC # BLD: 4.8 M/UL
RBC # FLD: 13.4 %
SODIUM SERPL-SCNC: 140 MMOL/L
SODIUM SERPL-SCNC: 141 MMOL/L
WBC # FLD AUTO: 8.86 K/UL

## 2022-06-06 PROCEDURE — 99214 OFFICE O/P EST MOD 30 MIN: CPT

## 2022-06-06 RX ORDER — PEGFILGRASTIM-CBQV 6 MG/.6ML
6 INJECTION, SOLUTION SUBCUTANEOUS ONCE
Refills: 0 | Status: COMPLETED | OUTPATIENT
Start: 2022-06-06 | End: 2022-06-06

## 2022-06-06 RX ORDER — DEXAMETHASONE 0.5 MG/5ML
12 ELIXIR ORAL ONCE
Refills: 0 | Status: COMPLETED | OUTPATIENT
Start: 2022-06-06 | End: 2022-06-06

## 2022-06-06 RX ORDER — DOXORUBICIN HYDROCHLORIDE 2 MG/ML
115 INJECTION, SOLUTION INTRAVENOUS ONCE
Refills: 0 | Status: COMPLETED | OUTPATIENT
Start: 2022-06-06 | End: 2022-06-06

## 2022-06-06 RX ORDER — FOSAPREPITANT DIMEGLUMINE 150 MG/5ML
150 INJECTION, POWDER, LYOPHILIZED, FOR SOLUTION INTRAVENOUS ONCE
Refills: 0 | Status: COMPLETED | OUTPATIENT
Start: 2022-06-06 | End: 2022-06-06

## 2022-06-06 RX ORDER — CYCLOPHOSPHAMIDE 100 MG
1145 VIAL (EA) INTRAVENOUS ONCE
Refills: 0 | Status: COMPLETED | OUTPATIENT
Start: 2022-06-06 | End: 2022-06-06

## 2022-06-06 RX ADMIN — DOXORUBICIN HYDROCHLORIDE 690 MILLIGRAM(S): 2 INJECTION, SOLUTION INTRAVENOUS at 11:18

## 2022-06-06 RX ADMIN — FOSAPREPITANT DIMEGLUMINE 300 MILLIGRAM(S): 150 INJECTION, POWDER, LYOPHILIZED, FOR SOLUTION INTRAVENOUS at 10:44

## 2022-06-06 RX ADMIN — PEGFILGRASTIM-CBQV 6 MILLIGRAM(S): 6 INJECTION, SOLUTION SUBCUTANEOUS at 11:18

## 2022-06-06 RX ADMIN — Medication 307.25 MILLIGRAM(S): at 11:19

## 2022-06-06 RX ADMIN — Medication 122 MILLIGRAM(S): at 10:44

## 2022-06-11 LAB
ALBUMIN SERPL ELPH-MCNC: 4.2 G/DL
ALP BLD-CCNC: 94 U/L
ALT SERPL-CCNC: 9 U/L
ANION GAP SERPL CALC-SCNC: 15 MMOL/L
AST SERPL-CCNC: 12 U/L
BILIRUB DIRECT SERPL-MCNC: <0.2 MG/DL
BILIRUB INDIRECT SERPL-MCNC: >0.1 MG/DL
BILIRUB SERPL-MCNC: 0.3 MG/DL
BUN SERPL-MCNC: 20 MG/DL
CALCIUM SERPL-MCNC: 8.8 MG/DL
CHLORIDE SERPL-SCNC: 102 MMOL/L
CO2 SERPL-SCNC: 25 MMOL/L
CREAT SERPL-MCNC: 0.6 MG/DL
EGFR: 96 ML/MIN/1.73M2
GLUCOSE SERPL-MCNC: 113 MG/DL
HCT VFR BLD CALC: 33.8 %
HGB BLD-MCNC: 12 G/DL
MCHC RBC-ENTMCNC: 30.4 PG
MCHC RBC-ENTMCNC: 35.5 G/DL
MCV RBC AUTO: 85.6 FL
PLATELET # BLD AUTO: 190 K/UL
PMV BLD: 9.9 FL
POTASSIUM SERPL-SCNC: 3.7 MMOL/L
PROT SERPL-MCNC: 6.2 G/DL
RBC # BLD: 3.95 M/UL
RBC # FLD: 15.9 %
SODIUM SERPL-SCNC: 142 MMOL/L
WBC # FLD AUTO: 10.64 K/UL

## 2022-06-11 NOTE — PHYSICAL EXAM
[Fully active, able to carry on all pre-disease performance without restriction] : Status 0 - Fully active, able to carry on all pre-disease performance without restriction [Normal] : affect appropriate [de-identified] : Oringinally large protruding mass about 8 cm in the right breast 12:00 3-7 cm from the nipple. It is decreasing in size and appears softer to palpation. There is no nipple retraction, no overlying skin change. The right axillary lymph node is not palpable.  There is no palpable mass in the left breast and no left axillary adenopathy.

## 2022-06-11 NOTE — ASSESSMENT
[FreeTextEntry1] : 70 yo female has synchronous colon cancer and breast cancer:\par - Invasive moderately differentiated adenocarcinoma of the sigmoid colon, MMR proficient, s/p robotic sigmoidectomy and colostomy. 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 Clinical stage is cT3N1.\par \par Assessment and Plan:\par \par #  Invasive well to moderately differentiated ductal carcinoma of the right breast, ER positive, GA and Her-2 negative, clinical stage cT3N1.\par -- Will proceed with 4th cycle chemo with AC. CBC reviewed. Blood counts are stable. \par -- Will give Neulasta on body after chemo. \par -- She will start weekly Taxol  in 2 weeks. Side effects to be discussed in detail at that time. \par -- Will order blood work: BMP and LFT before chemo.\par -- 2D Echo. LVEF is normal 55-60%.\par -- Port catheter site is dry and clean.\par -- In light of estrogen receptor positive breast cancer, she will be benefit with adjuvant endocrine therapy. Letrozole is recommended. She will start after completion of chemotherapy.\par \par # IIA ([id number]) adenocarcinoma of the sigmoid colon, MMR proficient, s/p robotic sigmoidectomy. \par -- The pathology report reviewed. She has a couple of high risk factors including bowel obstruction and positive lymphovascular invasion but 22 lymph node were negative. Benefit adjuvant adjuvant chemotherapy is likely to be small and she may forgo it. In addition, she needs to move onto her breast cancer treatment. \par -- Should have surveillance colonoscopy in 6 to 12 months after surgery. \par

## 2022-06-11 NOTE — HISTORY OF PRESENT ILLNESS
[de-identified] : 70 yo female was referred by Dr. Quinonez for consultation of breast cancer. Jesica initially presented to hospital for constipation and abdominal pain. CT a/p on 11/24/21 showed  large bowel obstruction secondary to a likely malignant stricture in the proximal sigmoid colon, small volume ascites and lobulated, partially visualized right breast mass and left breast nodule. Physical exam correlation and correlation with dedicated breast imaging was recommended as malignancy is of concern.\par \par On 11/26/21, CT chest showed right and left breast masses. The right breast mass measured 6.5 x 3.8 x 7.0 cm and contained small calcifications. The left breast mass measured 3.1 x 2.9 x 2.6 cm.\par \par On 11/29/21, colonoscopy revealed sigmoid colon mass and and biopsy showed invasive adenocarcinoma, moderately differentiated. MMR proteins were all expressed. 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 \par \par On 12/21/21, b/l breast dx mammo and US were performed which showed 10 cm mass at the 11 to 1:00 position 3 to 4 cm from the nipple corresponding to the biopsy-proven right breast carcinoma.  In the right axilla, at the 11:00 position 13 cm from the nipple, there is an abnormal lymph node measuring 2.3 x 1.3 x 0.9 cm. Ultrasound-guided biopsy is recommended. In the left breast, there are scattered similar appearing subcentimeter circumscribed masses seen throughout the left breast. These correspond to mammographically demonstrated masses:\par At the 3:00 position 6 cm from the nipple, there is a mass measuring 0.4 x 0.3 x 0.3 cm. Ultrasound-guided biopsy of this mass is recommended.\par At the 7 to 8:00 position 5 cm from the nipple, there is a mass measuring 0.4 x 0.3 x 0.2 cm.\par At the 8 to 9:00 position 4 cm from the nipple, there is a circumscribed mass measuring 0.4 x 0.4 x 0.3 cm.\par At the 8 to 9:00 position 3 cm from nipple, corresponding to area palpable concern and mammographic findings, there is a heterogeneous mass measuring 2.5 x 2.3 x 2.0 cm. Ultrasound-guided biopsy is recommended.\par \par On 12/30/21, left breast 3 N6 mass, ultrasound guided needle core biopsies revealed radial sclerosing lesion (radial scar) and Benign atrophic breast tissue with proliferative type fibrocystic changes. Left breast 8-9 N3 mass, ultrasound guided needle core biopsies showed single minute fragment of benign atrophic breast tissue.  \par The right axillary mass, ultrasound guided needle core biopsies were positive for moderately differentiated adenocarcinoma, most likely representative of metastasis from the patient's known mammary primary carcinoma to an axillary lymph node with mervat replacement, obliteration, and extensive extracapsular extension.\par \par On 1/19/22, left breast relatively posterior calcifications, stereotactic guided vacuum assisted needle core biopsies showed radial sclerosing lesion (radial scar) and benign atrophic breast tissue with proliferative type fibrocystic changes. Left relatively anterior calcifications, stereotactic guided vacuum assisted needle core biopsies showed benign atrophic breast tissue with proliferative type fibrocystic changes.  \par \par On 1/5/22, she had b/l breast MRI which showed 8 cm biopsy-proven right breast index carcinoma and axillary mervat metastasis. Biopsy-proven left breast radial scar with no additional sites of suspicious enhancement.\par \par On 2/17/22, she underwent robotic sigmoidectomy and colostomy. The pathology revealed Invasive moderately differentiated adenocarcinoma (6 cm in greatest dimension), invading through muscularis propria into pericolorectal tissue (pT3), +LVI.  All margins were negative for invasive carcinoma.  Twenty two lymph nodes were negative for metastatic carcinoma. She recovered well from surgery. \par \par She is here today with her  to discuss breast cancer treatment. She has no family h/o breast cancer or any other kinds of cancer.  [de-identified] : 4/21/22:susanne Mooney is here today for followup and chemotherapy. She has synchronous colon cancer and breast cancer: invasive moderately differentiated adenocarcinoma of the sigmoid colon, MMR proficient, s/p robotic sigmoidectomy and colostomy. 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 Clinical stage is cT3N1. Her staging CT and bone scan did not show evidence of distant mets. She is here today to start neoadjuvant chemotherapy for breast cancer. \par \par 5/9/22:susanne Mooney is here today for followup and chemotherapy. She has synchronous colon cancer and breast cancer: invasive moderately differentiated adenocarcinoma of the sigmoid colon, MMR proficient, s/p robotic sigmoidectomy and colostomy. 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 Clinical stage is cT3N1. Her staging CT and bone scan did not show evidence of distant mets. She started on neoadjuvant chemotherapy for breast cancer. She had 1st cycle AC 2 weeks ago and tolerated well. \par \par 5/23/22susanne Mooney is here today for followup and chemotherapy. She has synchronous colon cancer and breast cancer: invasive moderately differentiated adenocarcinoma of the sigmoid colon, MMR proficient, s/p robotic sigmoidectomy and colostomy. 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 Clinical stage is cT3N1. Her staging CT and bone scan did not show evidence of distant mets. She started on neoadjuvant chemotherapy. To date, she has had 2 cycles and tolerated well. She did not have fever, mouth sore, vomiting or diarrhea. \par \par 6/6/22susanne Mooney is here today for followup and chemotherapy. She has synchronous colon cancer and breast cancer: invasive moderately differentiated adenocarcinoma of the sigmoid colon, MMR proficient, s/p robotic sigmoidectomy and colostomy. 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 Clinical stage is cT3N1. Her staging CT and bone scan did not show evidence of distant mets. She has been on neoadjuvant chemotherapy with dose dense AC. To date, she has had 3 cycles and tolerated well. She did not have fever, mouth sore, vomiting or diarrhea. She does endorse some burning/tearing sensation in the eye.

## 2022-06-20 ENCOUNTER — APPOINTMENT (OUTPATIENT)
Dept: INFUSION THERAPY | Facility: CLINIC | Age: 72
End: 2022-06-20
Payer: MEDICARE

## 2022-06-20 ENCOUNTER — LABORATORY RESULT (OUTPATIENT)
Age: 72
End: 2022-06-20

## 2022-06-20 ENCOUNTER — APPOINTMENT (OUTPATIENT)
Dept: HEMATOLOGY ONCOLOGY | Facility: CLINIC | Age: 72
End: 2022-06-20
Payer: MEDICARE

## 2022-06-20 VITALS
BODY MASS INDEX: 30.66 KG/M2 | HEIGHT: 65 IN | DIASTOLIC BLOOD PRESSURE: 78 MMHG | HEART RATE: 90 BPM | WEIGHT: 184 LBS | SYSTOLIC BLOOD PRESSURE: 138 MMHG | RESPIRATION RATE: 20 BRPM | TEMPERATURE: 97.7 F

## 2022-06-20 PROCEDURE — 99215 OFFICE O/P EST HI 40 MIN: CPT

## 2022-06-20 RX ORDER — DEXAMETHASONE 0.5 MG/5ML
10 ELIXIR ORAL ONCE
Refills: 0 | Status: COMPLETED | OUTPATIENT
Start: 2022-06-20 | End: 2022-06-20

## 2022-06-20 RX ORDER — PACLITAXEL 6 MG/ML
152 INJECTION, SOLUTION, CONCENTRATE INTRAVENOUS ONCE
Refills: 0 | Status: COMPLETED | OUTPATIENT
Start: 2022-06-20 | End: 2022-06-20

## 2022-06-20 RX ORDER — DIPHENHYDRAMINE HCL 50 MG
50 CAPSULE ORAL ONCE
Refills: 0 | Status: COMPLETED | OUTPATIENT
Start: 2022-06-20 | End: 2022-06-20

## 2022-06-20 RX ORDER — FAMOTIDINE 10 MG/ML
20 INJECTION INTRAVENOUS ONCE
Refills: 0 | Status: COMPLETED | OUTPATIENT
Start: 2022-06-20 | End: 2022-06-20

## 2022-06-20 RX ADMIN — PACLITAXEL 275.33 MILLIGRAM(S): 6 INJECTION, SOLUTION, CONCENTRATE INTRAVENOUS at 13:10

## 2022-06-20 RX ADMIN — Medication 118 MILLIGRAM(S): at 12:20

## 2022-06-20 RX ADMIN — Medication 102 MILLIGRAM(S): at 12:00

## 2022-06-20 RX ADMIN — Medication 10 MILLIGRAM(S): at 12:35

## 2022-06-20 RX ADMIN — PACLITAXEL 152 MILLIGRAM(S): 6 INJECTION, SOLUTION, CONCENTRATE INTRAVENOUS at 15:00

## 2022-06-20 RX ADMIN — FAMOTIDINE 104 MILLIGRAM(S): 10 INJECTION INTRAVENOUS at 12:40

## 2022-06-20 RX ADMIN — FAMOTIDINE 20 MILLIGRAM(S): 10 INJECTION INTRAVENOUS at 12:55

## 2022-06-20 RX ADMIN — Medication 50 MILLIGRAM(S): at 12:15

## 2022-06-20 NOTE — HISTORY OF PRESENT ILLNESS
[de-identified] : 70 yo female was referred by Dr. Quinonez for consultation of breast cancer. Jesica initially presented to hospital for constipation and abdominal pain. CT a/p on 11/24/21 showed  large bowel obstruction secondary to a likely malignant stricture in the proximal sigmoid colon, small volume ascites and lobulated, partially visualized right breast mass and left breast nodule. Physical exam correlation and correlation with dedicated breast imaging was recommended as malignancy is of concern.\par \par On 11/26/21, CT chest showed right and left breast masses. The right breast mass measured 6.5 x 3.8 x 7.0 cm and contained small calcifications. The left breast mass measured 3.1 x 2.9 x 2.6 cm.\par \par On 11/29/21, colonoscopy revealed sigmoid colon mass and and biopsy showed invasive adenocarcinoma, moderately differentiated. MMR proteins were all expressed. 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 \par \par On 12/21/21, b/l breast dx mammo and US were performed which showed 10 cm mass at the 11 to 1:00 position 3 to 4 cm from the nipple corresponding to the biopsy-proven right breast carcinoma.  In the right axilla, at the 11:00 position 13 cm from the nipple, there is an abnormal lymph node measuring 2.3 x 1.3 x 0.9 cm. Ultrasound-guided biopsy is recommended. In the left breast, there are scattered similar appearing subcentimeter circumscribed masses seen throughout the left breast. These correspond to mammographically demonstrated masses:\par At the 3:00 position 6 cm from the nipple, there is a mass measuring 0.4 x 0.3 x 0.3 cm. Ultrasound-guided biopsy of this mass is recommended.\par At the 7 to 8:00 position 5 cm from the nipple, there is a mass measuring 0.4 x 0.3 x 0.2 cm.\par At the 8 to 9:00 position 4 cm from the nipple, there is a circumscribed mass measuring 0.4 x 0.4 x 0.3 cm.\par At the 8 to 9:00 position 3 cm from nipple, corresponding to area palpable concern and mammographic findings, there is a heterogeneous mass measuring 2.5 x 2.3 x 2.0 cm. Ultrasound-guided biopsy is recommended.\par \par On 12/30/21, left breast 3 N6 mass, ultrasound guided needle core biopsies revealed radial sclerosing lesion (radial scar) and Benign atrophic breast tissue with proliferative type fibrocystic changes. Left breast 8-9 N3 mass, ultrasound guided needle core biopsies showed single minute fragment of benign atrophic breast tissue.  \par The right axillary mass, ultrasound guided needle core biopsies were positive for moderately differentiated adenocarcinoma, most likely representative of metastasis from the patient's known mammary primary carcinoma to an axillary lymph node with mervat replacement, obliteration, and extensive extracapsular extension.\par \par On 1/19/22, left breast relatively posterior calcifications, stereotactic guided vacuum assisted needle core biopsies showed radial sclerosing lesion (radial scar) and benign atrophic breast tissue with proliferative type fibrocystic changes. Left relatively anterior calcifications, stereotactic guided vacuum assisted needle core biopsies showed benign atrophic breast tissue with proliferative type fibrocystic changes.  \par \par On 1/5/22, she had b/l breast MRI which showed 8 cm biopsy-proven right breast index carcinoma and axillary mervat metastasis. Biopsy-proven left breast radial scar with no additional sites of suspicious enhancement.\par \par On 2/17/22, she underwent robotic sigmoidectomy and colostomy. The pathology revealed Invasive moderately differentiated adenocarcinoma (6 cm in greatest dimension), invading through muscularis propria into pericolorectal tissue (pT3), +LVI.  All margins were negative for invasive carcinoma.  Twenty two lymph nodes were negative for metastatic carcinoma. She recovered well from surgery. \par \par She is here today with her  to discuss breast cancer treatment. She has no family h/o breast cancer or any other kinds of cancer.  [de-identified] : 4/21/22:susanne Mooney is here today for followup and chemotherapy. She has synchronous colon cancer and breast cancer: invasive moderately differentiated adenocarcinoma of the sigmoid colon, MMR proficient, s/p robotic sigmoidectomy and colostomy. 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 Clinical stage is cT3N1. Her staging CT and bone scan did not show evidence of distant mets. She is here today to start neoadjuvant chemotherapy for breast cancer. \par \par 5/9/22:susanne Mooney is here today for followup and chemotherapy. She has synchronous colon cancer and breast cancer: invasive moderately differentiated adenocarcinoma of the sigmoid colon, MMR proficient, s/p robotic sigmoidectomy and colostomy. 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 Clinical stage is cT3N1. Her staging CT and bone scan did not show evidence of distant mets. She started on neoadjuvant chemotherapy for breast cancer. She had 1st cycle AC 2 weeks ago and tolerated well. \par \par 5/23/22susanne Mooney is here today for followup and chemotherapy. She has synchronous colon cancer and breast cancer: invasive moderately differentiated adenocarcinoma of the sigmoid colon, MMR proficient, s/p robotic sigmoidectomy and colostomy. 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 Clinical stage is cT3N1. Her staging CT and bone scan did not show evidence of distant mets. She started on neoadjuvant chemotherapy. To date, she has had 2 cycles and tolerated well. She did not have fever, mouth sore, vomiting or diarrhea. \par \par 6/6/22susanne Mooney is here today for followup and chemotherapy. She has synchronous colon cancer and breast cancer: invasive moderately differentiated adenocarcinoma of the sigmoid colon, MMR proficient, s/p robotic sigmoidectomy and colostomy. 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 Clinical stage is cT3N1. Her staging CT and bone scan did not show evidence of distant mets. She has been on neoadjuvant chemotherapy with dose dense AC. To date, she has had 3 cycles and tolerated well. She did not have fever, mouth sore, vomiting or diarrhea. She does endorse some burning/tearing sensation in the eye. \par \par 6/20/22:\svitlana Mooney is here today for followup and chemotherapy. She has synchronous colon cancer and breast cancer: invasive moderately differentiated adenocarcinoma of the sigmoid colon, MMR proficient, s/p robotic sigmoidectomy and colostomy. 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 Clinical stage is cT3N1. Her staging CT and bone scan did not show evidence of distant mets. She has been on neoadjuvant chemotherapy. She completed dose dense AC x 4. She feels more fatigue. Her eye burning is getting better.

## 2022-06-20 NOTE — ASSESSMENT
[FreeTextEntry1] : 70 yo female has synchronous colon cancer and breast cancer:\par - Invasive moderately differentiated adenocarcinoma of the sigmoid colon, MMR proficient, s/p robotic sigmoidectomy and colostomy. 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 Clinical stage is cT3N1.\par \par Assessment and Plan:\par \par #  Invasive well to moderately differentiated ductal carcinoma of the right breast, ER positive, WA and Her-2 negative, clinical stage cT3N1.\par -- Completed  AC x 4. Willl proceed with weekly Taxol  today. Plan to give total of 12 times. \par -- Reviewed side effects which may include but not limit to bone marrow suppression, cytopenia, increased risk of infection, nausea, vomiting, diarrhea, fatigue, alopecia, infusional reaction, peripheral neuropathy. \par -- CBC from today reviewed. Blood counts are adequate for chemo. Will order additioanl blood work for BMP and LFT. \par -- Port catheter site checked, is dry and clean.\par -- In light of estrogen receptor positive breast cancer, she will be benefit with adjuvant endocrine therapy. Letrozole is recommended. She will start after completion of chemotherapy.\par \par # IIA ([id number]) adenocarcinoma of the sigmoid colon, MMR proficient, s/p robotic sigmoidectomy. \par -- The pathology report reviewed. She has a couple of high risk factors including bowel obstruction and positive lymphovascular invasion but 22 lymph node were negative. Benefit adjuvant adjuvant chemotherapy is likely to be small and she may forgo it. In addition, she needs to move onto her breast cancer treatment. \par -- Should have surveillance colonoscopy in 6 to 12 months after surgery. \par

## 2022-06-20 NOTE — PHYSICAL EXAM
[Fully active, able to carry on all pre-disease performance without restriction] : Status 0 - Fully active, able to carry on all pre-disease performance without restriction [Normal] : affect appropriate [de-identified] : Persistent large right breast mass and size reduced. No palpable right axillary lymph node.

## 2022-06-27 ENCOUNTER — LABORATORY RESULT (OUTPATIENT)
Age: 72
End: 2022-06-27

## 2022-06-27 ENCOUNTER — APPOINTMENT (OUTPATIENT)
Dept: INFUSION THERAPY | Facility: CLINIC | Age: 72
End: 2022-06-27

## 2022-06-27 RX ORDER — DEXAMETHASONE 0.5 MG/5ML
10 ELIXIR ORAL ONCE
Refills: 0 | Status: COMPLETED | OUTPATIENT
Start: 2022-06-27 | End: 2022-06-27

## 2022-06-27 RX ORDER — FAMOTIDINE 10 MG/ML
20 INJECTION INTRAVENOUS ONCE
Refills: 0 | Status: COMPLETED | OUTPATIENT
Start: 2022-06-27 | End: 2022-06-27

## 2022-06-27 RX ORDER — PACLITAXEL 6 MG/ML
152 INJECTION, SOLUTION, CONCENTRATE INTRAVENOUS ONCE
Refills: 0 | Status: COMPLETED | OUTPATIENT
Start: 2022-06-27 | End: 2022-06-27

## 2022-06-27 RX ORDER — DIPHENHYDRAMINE HCL 50 MG
50 CAPSULE ORAL ONCE
Refills: 0 | Status: COMPLETED | OUTPATIENT
Start: 2022-06-27 | End: 2022-06-27

## 2022-06-27 RX ADMIN — Medication 118 MILLIGRAM(S): at 09:58

## 2022-06-27 RX ADMIN — FAMOTIDINE 104 MILLIGRAM(S): 10 INJECTION INTRAVENOUS at 09:58

## 2022-06-27 RX ADMIN — Medication 102 MILLIGRAM(S): at 09:59

## 2022-06-27 RX ADMIN — PACLITAXEL 275.33 MILLIGRAM(S): 6 INJECTION, SOLUTION, CONCENTRATE INTRAVENOUS at 10:25

## 2022-07-05 ENCOUNTER — OUTPATIENT (OUTPATIENT)
Dept: OUTPATIENT SERVICES | Facility: HOSPITAL | Age: 72
LOS: 1 days | Discharge: HOME | End: 2022-07-05

## 2022-07-05 ENCOUNTER — APPOINTMENT (OUTPATIENT)
Dept: INFUSION THERAPY | Facility: CLINIC | Age: 72
End: 2022-07-05

## 2022-07-05 ENCOUNTER — LABORATORY RESULT (OUTPATIENT)
Age: 72
End: 2022-07-05

## 2022-07-05 DIAGNOSIS — C18.7 MALIGNANT NEOPLASM OF SIGMOID COLON: ICD-10-CM

## 2022-07-05 DIAGNOSIS — C50.211 MALIGNANT NEOPLASM OF UPPER-INNER QUADRANT OF RIGHT FEMALE BREAST: ICD-10-CM

## 2022-07-05 DIAGNOSIS — Z90.49 ACQUIRED ABSENCE OF OTHER SPECIFIED PARTS OF DIGESTIVE TRACT: Chronic | ICD-10-CM

## 2022-07-05 DIAGNOSIS — Z51.11 ENCOUNTER FOR ANTINEOPLASTIC CHEMOTHERAPY: ICD-10-CM

## 2022-07-05 RX ORDER — PACLITAXEL 6 MG/ML
152 INJECTION, SOLUTION, CONCENTRATE INTRAVENOUS ONCE
Refills: 0 | Status: COMPLETED | OUTPATIENT
Start: 2022-07-05 | End: 2022-07-05

## 2022-07-05 RX ORDER — DEXAMETHASONE 0.5 MG/5ML
10 ELIXIR ORAL ONCE
Refills: 0 | Status: COMPLETED | OUTPATIENT
Start: 2022-07-05 | End: 2022-07-05

## 2022-07-05 RX ORDER — DIPHENHYDRAMINE HCL 50 MG
50 CAPSULE ORAL ONCE
Refills: 0 | Status: COMPLETED | OUTPATIENT
Start: 2022-07-05 | End: 2022-07-05

## 2022-07-05 RX ORDER — FAMOTIDINE 10 MG/ML
20 INJECTION INTRAVENOUS ONCE
Refills: 0 | Status: COMPLETED | OUTPATIENT
Start: 2022-07-05 | End: 2022-07-05

## 2022-07-05 RX ADMIN — FAMOTIDINE 104 MILLIGRAM(S): 10 INJECTION INTRAVENOUS at 09:16

## 2022-07-05 RX ADMIN — Medication 118 MILLIGRAM(S): at 09:16

## 2022-07-05 RX ADMIN — Medication 102 MILLIGRAM(S): at 09:17

## 2022-07-05 RX ADMIN — PACLITAXEL 152 MILLIGRAM(S): 6 INJECTION, SOLUTION, CONCENTRATE INTRAVENOUS at 09:33

## 2022-07-11 ENCOUNTER — APPOINTMENT (OUTPATIENT)
Dept: HEMATOLOGY ONCOLOGY | Facility: CLINIC | Age: 72
End: 2022-07-11

## 2022-07-11 ENCOUNTER — APPOINTMENT (OUTPATIENT)
Dept: INFUSION THERAPY | Facility: CLINIC | Age: 72
End: 2022-07-11

## 2022-07-11 ENCOUNTER — LABORATORY RESULT (OUTPATIENT)
Age: 72
End: 2022-07-11

## 2022-07-11 VITALS
HEIGHT: 65 IN | DIASTOLIC BLOOD PRESSURE: 90 MMHG | WEIGHT: 186 LBS | BODY MASS INDEX: 30.99 KG/M2 | SYSTOLIC BLOOD PRESSURE: 142 MMHG | RESPIRATION RATE: 19 BRPM | TEMPERATURE: 98 F | HEART RATE: 85 BPM

## 2022-07-11 PROCEDURE — 99215 OFFICE O/P EST HI 40 MIN: CPT

## 2022-07-11 RX ORDER — DIPHENHYDRAMINE HCL 50 MG
50 CAPSULE ORAL ONCE
Refills: 0 | Status: COMPLETED | OUTPATIENT
Start: 2022-07-11 | End: 2022-07-11

## 2022-07-11 RX ORDER — DEXAMETHASONE 0.5 MG/5ML
10 ELIXIR ORAL ONCE
Refills: 0 | Status: COMPLETED | OUTPATIENT
Start: 2022-07-11 | End: 2022-07-11

## 2022-07-11 RX ORDER — FAMOTIDINE 10 MG/ML
20 INJECTION INTRAVENOUS ONCE
Refills: 0 | Status: COMPLETED | OUTPATIENT
Start: 2022-07-11 | End: 2022-07-11

## 2022-07-11 RX ORDER — PACLITAXEL 6 MG/ML
152 INJECTION, SOLUTION, CONCENTRATE INTRAVENOUS ONCE
Refills: 0 | Status: COMPLETED | OUTPATIENT
Start: 2022-07-11 | End: 2022-07-11

## 2022-07-11 RX ADMIN — PACLITAXEL 152 MILLIGRAM(S): 6 INJECTION, SOLUTION, CONCENTRATE INTRAVENOUS at 09:40

## 2022-07-11 RX ADMIN — Medication 102 MILLIGRAM(S): at 09:41

## 2022-07-11 RX ADMIN — FAMOTIDINE 104 MILLIGRAM(S): 10 INJECTION INTRAVENOUS at 09:41

## 2022-07-11 RX ADMIN — Medication 118 MILLIGRAM(S): at 09:41

## 2022-07-11 NOTE — ASSESSMENT
[FreeTextEntry1] : 70 yo female has synchronous colon cancer and breast cancer:\par - Invasive moderately differentiated adenocarcinoma of the sigmoid colon, MMR proficient, s/p robotic sigmoidectomy and colostomy. 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 Clinical stage is cT3N1.\par \par Assessment and Plan:\par \par #  Invasive well to moderately differentiated ductal carcinoma of the right breast, ER positive, OR and Her-2 negative, clinical stage cT3N1.\par -- Completed  AC x 4. Currently on weekly Taxol  today (4th dose 7/11/22) Plan to give total of 12 times. \par -- Reviewed side effects which may include but not limit to bone marrow suppression, cytopenia, increased risk of infection, nausea, vomiting, diarrhea, fatigue, alopecia, infusional reaction, peripheral neuropathy. \par -- CBC from today reviewed. Blood counts are adequate for chemo. BMP and LFTs reviewed from 7/5/22.\par -- Port catheter site checked, is dry and clean.\par -- In light of estrogen receptor positive breast cancer, she will be benefit with adjuvant endocrine therapy. Letrozole is recommended. She will start after completion of chemotherapy.\par \par # IIA ([id number]) adenocarcinoma of the sigmoid colon, MMR proficient, s/p robotic sigmoidectomy. \par -- The pathology report reviewed. She has a couple of high risk factors including bowel obstruction and positive lymphovascular invasion but 22 lymph node were negative. Benefit adjuvant adjuvant chemotherapy is likely to be small and she may forgo it. In addition, she needs to move onto her breast cancer treatment. \par -- Should have surveillance colonoscopy in 6 after surgery which was on 2/17/22. She will do colonoscopy after she finishes Taxol. \par \par RTC 4 weeks.\par \par Pt seen and above plan discussed with Dr Daniel.\par

## 2022-07-11 NOTE — HISTORY OF PRESENT ILLNESS
[de-identified] : 70 yo female was referred by Dr. Quinonez for consultation of breast cancer. Jesica initially presented to hospital for constipation and abdominal pain. CT a/p on 11/24/21 showed  large bowel obstruction secondary to a likely malignant stricture in the proximal sigmoid colon, small volume ascites and lobulated, partially visualized right breast mass and left breast nodule. Physical exam correlation and correlation with dedicated breast imaging was recommended as malignancy is of concern.\par \par On 11/26/21, CT chest showed right and left breast masses. The right breast mass measured 6.5 x 3.8 x 7.0 cm and contained small calcifications. The left breast mass measured 3.1 x 2.9 x 2.6 cm.\par \par On 11/29/21, colonoscopy revealed sigmoid colon mass and and biopsy showed invasive adenocarcinoma, moderately differentiated. MMR proteins were all expressed. 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 \par \par On 12/21/21, b/l breast dx mammo and US were performed which showed 10 cm mass at the 11 to 1:00 position 3 to 4 cm from the nipple corresponding to the biopsy-proven right breast carcinoma.  In the right axilla, at the 11:00 position 13 cm from the nipple, there is an abnormal lymph node measuring 2.3 x 1.3 x 0.9 cm. Ultrasound-guided biopsy is recommended. In the left breast, there are scattered similar appearing subcentimeter circumscribed masses seen throughout the left breast. These correspond to mammographically demonstrated masses:\par At the 3:00 position 6 cm from the nipple, there is a mass measuring 0.4 x 0.3 x 0.3 cm. Ultrasound-guided biopsy of this mass is recommended.\par At the 7 to 8:00 position 5 cm from the nipple, there is a mass measuring 0.4 x 0.3 x 0.2 cm.\par At the 8 to 9:00 position 4 cm from the nipple, there is a circumscribed mass measuring 0.4 x 0.4 x 0.3 cm.\par At the 8 to 9:00 position 3 cm from nipple, corresponding to area palpable concern and mammographic findings, there is a heterogeneous mass measuring 2.5 x 2.3 x 2.0 cm. Ultrasound-guided biopsy is recommended.\par \par On 12/30/21, left breast 3 N6 mass, ultrasound guided needle core biopsies revealed radial sclerosing lesion (radial scar) and Benign atrophic breast tissue with proliferative type fibrocystic changes. Left breast 8-9 N3 mass, ultrasound guided needle core biopsies showed single minute fragment of benign atrophic breast tissue.  \par The right axillary mass, ultrasound guided needle core biopsies were positive for moderately differentiated adenocarcinoma, most likely representative of metastasis from the patient's known mammary primary carcinoma to an axillary lymph node with mervat replacement, obliteration, and extensive extracapsular extension.\par \par On 1/19/22, left breast relatively posterior calcifications, stereotactic guided vacuum assisted needle core biopsies showed radial sclerosing lesion (radial scar) and benign atrophic breast tissue with proliferative type fibrocystic changes. Left relatively anterior calcifications, stereotactic guided vacuum assisted needle core biopsies showed benign atrophic breast tissue with proliferative type fibrocystic changes.  \par \par On 1/5/22, she had b/l breast MRI which showed 8 cm biopsy-proven right breast index carcinoma and axillary mervat metastasis. Biopsy-proven left breast radial scar with no additional sites of suspicious enhancement.\par \par On 2/17/22, she underwent robotic sigmoidectomy and colostomy. The pathology revealed Invasive moderately differentiated adenocarcinoma (6 cm in greatest dimension), invading through muscularis propria into pericolorectal tissue (pT3), +LVI.  All margins were negative for invasive carcinoma.  Twenty two lymph nodes were negative for metastatic carcinoma. She recovered well from surgery. \par \par She is here today with her  to discuss breast cancer treatment. She has no family h/o breast cancer or any other kinds of cancer.  [de-identified] : 4/21/22:susanne Mooney is here today for followup and chemotherapy. She has synchronous colon cancer and breast cancer: invasive moderately differentiated adenocarcinoma of the sigmoid colon, MMR proficient, s/p robotic sigmoidectomy and colostomy. 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 Clinical stage is cT3N1. Her staging CT and bone scan did not show evidence of distant mets. She is here today to start neoadjuvant chemotherapy for breast cancer. \par \par 5/9/22:susanne Mooney is here today for followup and chemotherapy. She has synchronous colon cancer and breast cancer: invasive moderately differentiated adenocarcinoma of the sigmoid colon, MMR proficient, s/p robotic sigmoidectomy and colostomy. 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 Clinical stage is cT3N1. Her staging CT and bone scan did not show evidence of distant mets. She started on neoadjuvant chemotherapy for breast cancer. She had 1st cycle AC 2 weeks ago and tolerated well. \par \par 5/23/22susanne Mooney is here today for followup and chemotherapy. She has synchronous colon cancer and breast cancer: invasive moderately differentiated adenocarcinoma of the sigmoid colon, MMR proficient, s/p robotic sigmoidectomy and colostomy. 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 Clinical stage is cT3N1. Her staging CT and bone scan did not show evidence of distant mets. She started on neoadjuvant chemotherapy. To date, she has had 2 cycles and tolerated well. She did not have fever, mouth sore, vomiting or diarrhea. \par \par 6/6/22susanne Mooney is here today for followup and chemotherapy. She has synchronous colon cancer and breast cancer: invasive moderately differentiated adenocarcinoma of the sigmoid colon, MMR proficient, s/p robotic sigmoidectomy and colostomy. 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 Clinical stage is cT3N1. Her staging CT and bone scan did not show evidence of distant mets. She has been on neoadjuvant chemotherapy with dose dense AC. To date, she has had 3 cycles and tolerated well. She did not have fever, mouth sore, vomiting or diarrhea. She does endorse some burning/tearing sensation in the eye. \par \par 6/20/22:susanne Mooney is here today for followup and chemotherapy. She has synchronous colon cancer and breast cancer: invasive moderately differentiated adenocarcinoma of the sigmoid colon, MMR proficient, s/p robotic sigmoidectomy and colostomy. The pathologic stage is IIA ([id number]), and invasive well to moderately differentiated ductal carcinoma of the right breast with dominant mucinous features (colloid carcinoma), ER pos %, [place] negative, Her-2 negative. Clinical stage is cT3N1. Her staging CT and bone scan did not show evidence of distant mets. She has been on neoadjuvant chemotherapy. She completed dose dense AC x 4. She feels more fatigue. Her eye burning is getting better.\par \par 7/11/22susanne Mooney is here for follow up and chemotherapy.  She has synchronous colon cancer and breast cancer: invasive moderately differentiated adenocarcinoma of the sigmoid colon, MMR proficient, s/p robotic sigmoidectomy and colostomy. The pathologic stage is IIA ([id number]), and invasive well to moderately differentiated ductal carcinoma of the right breast with dominant mucinous features (colloid carcinoma), ER pos %, [place] negative, Her-2 negative. Clinical stage is cT3N1. Her staging CT and bone scan did not show evidence of distant mets. She has been on neoadjuvant chemotherapy. She completed dose dense AC x 4. She completed 3 doses of weekly Taxol so far. She feels well. Her energy level is better and burning of the eyes is resolved. She followed up with [facility] and they want to wait until Taxol is finished for her chemotherapy.

## 2022-07-11 NOTE — PHYSICAL EXAM
[Fully active, able to carry on all pre-disease performance without restriction] : Status 0 - Fully active, able to carry on all pre-disease performance without restriction [Normal] : affect appropriate [de-identified] : Persistent large right breast mass and size reduced. No palpable right axillary lymph node.

## 2022-07-18 ENCOUNTER — LABORATORY RESULT (OUTPATIENT)
Age: 72
End: 2022-07-18

## 2022-07-18 ENCOUNTER — APPOINTMENT (OUTPATIENT)
Dept: INFUSION THERAPY | Facility: CLINIC | Age: 72
End: 2022-07-18

## 2022-07-18 RX ORDER — PACLITAXEL 6 MG/ML
152 INJECTION, SOLUTION, CONCENTRATE INTRAVENOUS ONCE
Refills: 0 | Status: COMPLETED | OUTPATIENT
Start: 2022-07-18 | End: 2022-07-18

## 2022-07-18 RX ORDER — DIPHENHYDRAMINE HCL 50 MG
50 CAPSULE ORAL ONCE
Refills: 0 | Status: COMPLETED | OUTPATIENT
Start: 2022-07-18 | End: 2022-07-18

## 2022-07-18 RX ORDER — FAMOTIDINE 10 MG/ML
20 INJECTION INTRAVENOUS ONCE
Refills: 0 | Status: COMPLETED | OUTPATIENT
Start: 2022-07-18 | End: 2022-07-18

## 2022-07-18 RX ORDER — DEXAMETHASONE 0.5 MG/5ML
10 ELIXIR ORAL ONCE
Refills: 0 | Status: COMPLETED | OUTPATIENT
Start: 2022-07-18 | End: 2022-07-18

## 2022-07-18 RX ADMIN — Medication 102 MILLIGRAM(S): at 13:38

## 2022-07-18 RX ADMIN — Medication 118 MILLIGRAM(S): at 13:38

## 2022-07-18 RX ADMIN — PACLITAXEL 152 MILLIGRAM(S): 6 INJECTION, SOLUTION, CONCENTRATE INTRAVENOUS at 13:39

## 2022-07-18 RX ADMIN — FAMOTIDINE 104 MILLIGRAM(S): 10 INJECTION INTRAVENOUS at 13:38

## 2022-07-25 ENCOUNTER — APPOINTMENT (OUTPATIENT)
Dept: INFUSION THERAPY | Facility: CLINIC | Age: 72
End: 2022-07-25

## 2022-07-25 ENCOUNTER — LABORATORY RESULT (OUTPATIENT)
Age: 72
End: 2022-07-25

## 2022-07-25 RX ORDER — PACLITAXEL 6 MG/ML
152 INJECTION, SOLUTION, CONCENTRATE INTRAVENOUS ONCE
Refills: 0 | Status: COMPLETED | OUTPATIENT
Start: 2022-07-25 | End: 2022-07-25

## 2022-07-25 RX ORDER — FAMOTIDINE 10 MG/ML
20 INJECTION INTRAVENOUS ONCE
Refills: 0 | Status: COMPLETED | OUTPATIENT
Start: 2022-07-25 | End: 2022-07-25

## 2022-07-25 RX ORDER — DIPHENHYDRAMINE HCL 50 MG
50 CAPSULE ORAL ONCE
Refills: 0 | Status: COMPLETED | OUTPATIENT
Start: 2022-07-25 | End: 2022-07-25

## 2022-07-25 RX ORDER — DEXAMETHASONE 0.5 MG/5ML
10 ELIXIR ORAL ONCE
Refills: 0 | Status: COMPLETED | OUTPATIENT
Start: 2022-07-25 | End: 2022-07-25

## 2022-07-25 RX ADMIN — FAMOTIDINE 104 MILLIGRAM(S): 10 INJECTION INTRAVENOUS at 10:13

## 2022-07-25 RX ADMIN — Medication 118 MILLIGRAM(S): at 10:12

## 2022-07-25 RX ADMIN — PACLITAXEL 152 MILLIGRAM(S): 6 INJECTION, SOLUTION, CONCENTRATE INTRAVENOUS at 10:40

## 2022-07-25 RX ADMIN — Medication 102 MILLIGRAM(S): at 10:13

## 2022-07-29 ENCOUNTER — LABORATORY RESULT (OUTPATIENT)
Age: 72
End: 2022-07-29

## 2022-07-29 ENCOUNTER — APPOINTMENT (OUTPATIENT)
Dept: HEMATOLOGY ONCOLOGY | Facility: CLINIC | Age: 72
End: 2022-07-29

## 2022-08-01 ENCOUNTER — APPOINTMENT (OUTPATIENT)
Dept: INFUSION THERAPY | Facility: CLINIC | Age: 72
End: 2022-08-01

## 2022-08-01 RX ORDER — DIPHENHYDRAMINE HCL 50 MG
50 CAPSULE ORAL ONCE
Refills: 0 | Status: COMPLETED | OUTPATIENT
Start: 2022-08-01 | End: 2022-08-01

## 2022-08-01 RX ORDER — DEXAMETHASONE 0.5 MG/5ML
10 ELIXIR ORAL ONCE
Refills: 0 | Status: COMPLETED | OUTPATIENT
Start: 2022-08-01 | End: 2022-08-01

## 2022-08-01 RX ORDER — PACLITAXEL 6 MG/ML
152 INJECTION, SOLUTION, CONCENTRATE INTRAVENOUS ONCE
Refills: 0 | Status: DISCONTINUED | OUTPATIENT
Start: 2022-08-01 | End: 2023-01-08

## 2022-08-01 RX ORDER — FAMOTIDINE 10 MG/ML
20 INJECTION INTRAVENOUS ONCE
Refills: 0 | Status: COMPLETED | OUTPATIENT
Start: 2022-08-01 | End: 2022-08-01

## 2022-08-01 RX ADMIN — Medication 118 MILLIGRAM(S): at 12:39

## 2022-08-01 RX ADMIN — FAMOTIDINE 104 MILLIGRAM(S): 10 INJECTION INTRAVENOUS at 12:39

## 2022-08-01 RX ADMIN — Medication 102 MILLIGRAM(S): at 12:39

## 2022-08-05 NOTE — HISTORY OF PRESENT ILLNESS
[0] : ~His/Her~ pain was 0 out of 10 [FreeTextEntry1] : 70 yo female has synchronous colon cancer and breast cancer s/p left port a cath placement on 4/20 in IR presents today for 1 week follow up.\par \par Patient appears well, offers no complaints or concerns at this time. Patient denies fevers/chills, or N/V/D. Patient had first round of chemotherapy via port yesterday without issue. \par \par On examination, port a cath site looks clean, dry and intact. Telfa dressing removed from both access site and port a cath site prior to today. Steri strips remain over port access site. Mild ecchymosis noted but healing as anticipated. No erythema or swelling present. No tenderness or drainage noted on manipulation. Port access site also C/D/I. No erythema, swelling or tenderness noted. No neck pain, no limited ROM. Dermabond on site, in tact.\par

## 2022-08-05 NOTE — ASSESSMENT
[FreeTextEntry1] : 70 yo female has synchronous colon cancer and breast cancer s/p left port a cath placement on 4/20 in IR presents today for 1 week follow up.\par \par Re-educated patient on procedure and anticipated healing time. Patient may get port site wet with soap and water, washing gently with washcloth as needed. Advised against rubbing area or removing Dermabond as it will fall off on its own.  Educated patient on signs and symptoms of infection, told to contact us for any further questions or concerns. Business card given with contact information.\par \par Port a cath ok for continued use.\par \par No further IR follow up or management needed.\par

## 2022-08-05 NOTE — REVIEW OF SYSTEMS
[Fever] : no fever [Chills] : no chills [Feeling Poorly] : not feeling poorly [Feeling Tired] : not feeling tired [Chest Pain] : no chest pain [Palpitations] : no palpitations [Shortness Of Breath] : no shortness of breath [Wheezing] : no wheezing [Cough] : no cough [SOB on Exertion] : no shortness of breath during exertion [Abdominal Pain] : no abdominal pain [Vomiting] : no vomiting [Constipation] : no constipation [Diarrhea] : no diarrhea [Confused] : no confusion [Dizziness] : no dizziness

## 2022-08-08 ENCOUNTER — APPOINTMENT (OUTPATIENT)
Dept: HEMATOLOGY ONCOLOGY | Facility: CLINIC | Age: 72
End: 2022-08-08

## 2022-08-08 ENCOUNTER — APPOINTMENT (OUTPATIENT)
Dept: INFUSION THERAPY | Facility: CLINIC | Age: 72
End: 2022-08-08

## 2022-08-08 ENCOUNTER — LABORATORY RESULT (OUTPATIENT)
Age: 72
End: 2022-08-08

## 2022-08-08 PROCEDURE — 99214 OFFICE O/P EST MOD 30 MIN: CPT

## 2022-08-08 RX ORDER — DIPHENHYDRAMINE HCL 50 MG
50 CAPSULE ORAL ONCE
Refills: 0 | Status: COMPLETED | OUTPATIENT
Start: 2022-08-08 | End: 2022-08-08

## 2022-08-08 RX ORDER — DEXAMETHASONE 0.5 MG/5ML
10 ELIXIR ORAL ONCE
Refills: 0 | Status: COMPLETED | OUTPATIENT
Start: 2022-08-08 | End: 2022-08-08

## 2022-08-08 RX ORDER — FAMOTIDINE 10 MG/ML
20 INJECTION INTRAVENOUS ONCE
Refills: 0 | Status: COMPLETED | OUTPATIENT
Start: 2022-08-08 | End: 2022-08-08

## 2022-08-08 RX ORDER — PACLITAXEL 6 MG/ML
152 INJECTION, SOLUTION, CONCENTRATE INTRAVENOUS ONCE
Refills: 0 | Status: COMPLETED | OUTPATIENT
Start: 2022-08-08 | End: 2022-08-08

## 2022-08-08 RX ADMIN — Medication 118 MILLIGRAM(S): at 13:23

## 2022-08-08 RX ADMIN — PACLITAXEL 152 MILLIGRAM(S): 6 INJECTION, SOLUTION, CONCENTRATE INTRAVENOUS at 14:10

## 2022-08-08 RX ADMIN — PACLITAXEL 152 MILLIGRAM(S): 6 INJECTION, SOLUTION, CONCENTRATE INTRAVENOUS at 15:15

## 2022-08-08 RX ADMIN — Medication 102 MILLIGRAM(S): at 13:22

## 2022-08-08 RX ADMIN — FAMOTIDINE 104 MILLIGRAM(S): 10 INJECTION INTRAVENOUS at 13:22

## 2022-08-11 NOTE — END OF VISIT
[] : Fellow [FreeTextEntry3] : She returns for follow-up today.  She denies any side effects from Taxol specifically neuropathy.  CBC was reviewed which demonstrated mild anemia with decrease lymphocytes and normal neutrophil and she will continue her Taxol today.  She will see us back in 4 weeks.

## 2022-08-11 NOTE — PHYSICAL EXAM
[Fully active, able to carry on all pre-disease performance without restriction] : Status 0 - Fully active, able to carry on all pre-disease performance without restriction [Normal] : affect appropriate [de-identified] : Persistent large right breast mass and size reduced. No palpable right axillary lymph node.

## 2022-08-11 NOTE — ASSESSMENT
[FreeTextEntry1] : 70 yo female has synchronous colon cancer and breast cancer:\par - Invasive moderately differentiated adenocarcinoma of the sigmoid colon, MMR proficient, s/p robotic sigmoidectomy and colostomy. 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 Clinical stage is cT3N1.\par \par Assessment and Plan:\par \par #  Invasive well to moderately differentiated ductal carcinoma of the right breast, ER positive, FL and Her-2 negative, clinical stage cT3N1.\par -- Completed  AC x 4. Currently on weekly Taxol  today (8th dose 8/8/22) Plan to give total of 12 times. \par -- Reviewed side effects which may include but not limit to bone marrow suppression, cytopenia, increased risk of infection, nausea, vomiting, diarrhea, fatigue, alopecia, infusional reaction, peripheral neuropathy. \par -- CBC from today reviewed. Blood counts are adequate for chemo. BMP and LFTs reviewed from 7/5/22.\par -- Port catheter site checked, is dry and clean.\par -- In light of estrogen receptor positive breast cancer, she will be benefit with adjuvant endocrine therapy. Letrozole is recommended. She will start after completion of chemotherapy.\par \par # IIA ([id number]) adenocarcinoma of the sigmoid colon, MMR proficient, s/p robotic sigmoidectomy. \par -- The pathology report reviewed. She has a couple of high risk factors including bowel obstruction and positive lymphovascular invasion but 22 lymph node were negative. Benefit adjuvant adjuvant chemotherapy is likely to be small and she may forgo it. In addition, she needs to move onto her breast cancer treatment. \par -- Should have surveillance colonoscopy in 6 mo after surgery which was on 2/17/22. She will do colonoscopy after she finishes Taxol. \par \par RTC 4 weeks.\par \par Pt seen and above plan discussed with Dr Odell. \par

## 2022-08-15 ENCOUNTER — LABORATORY RESULT (OUTPATIENT)
Age: 72
End: 2022-08-15

## 2022-08-15 ENCOUNTER — APPOINTMENT (OUTPATIENT)
Dept: INFUSION THERAPY | Facility: CLINIC | Age: 72
End: 2022-08-15

## 2022-08-15 VITALS
RESPIRATION RATE: 16 BRPM | TEMPERATURE: 98.1 F | DIASTOLIC BLOOD PRESSURE: 70 MMHG | HEART RATE: 85 BPM | SYSTOLIC BLOOD PRESSURE: 168 MMHG

## 2022-08-15 RX ORDER — DEXAMETHASONE 0.5 MG/5ML
10 ELIXIR ORAL ONCE
Refills: 0 | Status: COMPLETED | OUTPATIENT
Start: 2022-08-15 | End: 2022-08-15

## 2022-08-15 RX ORDER — FAMOTIDINE 10 MG/ML
20 INJECTION INTRAVENOUS ONCE
Refills: 0 | Status: COMPLETED | OUTPATIENT
Start: 2022-08-15 | End: 2022-08-15

## 2022-08-15 RX ORDER — DIPHENHYDRAMINE HCL 50 MG
50 CAPSULE ORAL ONCE
Refills: 0 | Status: COMPLETED | OUTPATIENT
Start: 2022-08-15 | End: 2022-08-15

## 2022-08-15 RX ORDER — PACLITAXEL 6 MG/ML
152 INJECTION, SOLUTION, CONCENTRATE INTRAVENOUS ONCE
Refills: 0 | Status: COMPLETED | OUTPATIENT
Start: 2022-08-15 | End: 2022-08-15

## 2022-08-15 RX ADMIN — Medication 102 MILLIGRAM(S): at 11:05

## 2022-08-15 RX ADMIN — Medication 50 MILLIGRAM(S): at 11:25

## 2022-08-15 RX ADMIN — FAMOTIDINE 104 MILLIGRAM(S): 10 INJECTION INTRAVENOUS at 10:45

## 2022-08-15 RX ADMIN — PACLITAXEL 152 MILLIGRAM(S): 6 INJECTION, SOLUTION, CONCENTRATE INTRAVENOUS at 12:30

## 2022-08-15 RX ADMIN — PACLITAXEL 152 MILLIGRAM(S): 6 INJECTION, SOLUTION, CONCENTRATE INTRAVENOUS at 11:30

## 2022-08-15 RX ADMIN — FAMOTIDINE 20 MILLIGRAM(S): 10 INJECTION INTRAVENOUS at 11:00

## 2022-08-15 RX ADMIN — Medication 118 MILLIGRAM(S): at 10:30

## 2022-08-15 RX ADMIN — Medication 10 MILLIGRAM(S): at 10:44

## 2022-08-22 ENCOUNTER — LABORATORY RESULT (OUTPATIENT)
Age: 72
End: 2022-08-22

## 2022-08-22 ENCOUNTER — APPOINTMENT (OUTPATIENT)
Dept: INFUSION THERAPY | Facility: CLINIC | Age: 72
End: 2022-08-22

## 2022-08-22 RX ORDER — DIPHENHYDRAMINE HCL 50 MG
50 CAPSULE ORAL ONCE
Refills: 0 | Status: COMPLETED | OUTPATIENT
Start: 2022-08-22 | End: 2022-08-22

## 2022-08-22 RX ORDER — DEXAMETHASONE 0.5 MG/5ML
10 ELIXIR ORAL ONCE
Refills: 0 | Status: COMPLETED | OUTPATIENT
Start: 2022-08-22 | End: 2022-08-22

## 2022-08-22 RX ORDER — PACLITAXEL 6 MG/ML
152 INJECTION, SOLUTION, CONCENTRATE INTRAVENOUS ONCE
Refills: 0 | Status: COMPLETED | OUTPATIENT
Start: 2022-08-22 | End: 2022-08-22

## 2022-08-22 RX ORDER — FAMOTIDINE 10 MG/ML
20 INJECTION INTRAVENOUS ONCE
Refills: 0 | Status: COMPLETED | OUTPATIENT
Start: 2022-08-22 | End: 2022-08-22

## 2022-08-22 RX ADMIN — FAMOTIDINE 104 MILLIGRAM(S): 10 INJECTION INTRAVENOUS at 13:57

## 2022-08-22 RX ADMIN — Medication 118 MILLIGRAM(S): at 13:56

## 2022-08-22 RX ADMIN — Medication 102 MILLIGRAM(S): at 13:56

## 2022-08-22 RX ADMIN — PACLITAXEL 152 MILLIGRAM(S): 6 INJECTION, SOLUTION, CONCENTRATE INTRAVENOUS at 14:14

## 2022-08-29 ENCOUNTER — LABORATORY RESULT (OUTPATIENT)
Age: 72
End: 2022-08-29

## 2022-08-29 ENCOUNTER — APPOINTMENT (OUTPATIENT)
Dept: INFUSION THERAPY | Facility: CLINIC | Age: 72
End: 2022-08-29

## 2022-08-29 RX ORDER — DEXAMETHASONE 0.5 MG/5ML
10 ELIXIR ORAL ONCE
Refills: 0 | Status: COMPLETED | OUTPATIENT
Start: 2022-08-29 | End: 2022-08-29

## 2022-08-29 RX ORDER — FAMOTIDINE 10 MG/ML
20 INJECTION INTRAVENOUS ONCE
Refills: 0 | Status: COMPLETED | OUTPATIENT
Start: 2022-08-29 | End: 2022-08-29

## 2022-08-29 RX ORDER — DIPHENHYDRAMINE HCL 50 MG
50 CAPSULE ORAL ONCE
Refills: 0 | Status: COMPLETED | OUTPATIENT
Start: 2022-08-29 | End: 2022-08-29

## 2022-08-29 RX ORDER — PACLITAXEL 6 MG/ML
152 INJECTION, SOLUTION, CONCENTRATE INTRAVENOUS ONCE
Refills: 0 | Status: COMPLETED | OUTPATIENT
Start: 2022-08-29 | End: 2022-08-29

## 2022-08-29 RX ADMIN — Medication 102 MILLIGRAM(S): at 14:17

## 2022-08-29 RX ADMIN — FAMOTIDINE 104 MILLIGRAM(S): 10 INJECTION INTRAVENOUS at 14:17

## 2022-08-29 RX ADMIN — Medication 118 MILLIGRAM(S): at 14:16

## 2022-08-29 RX ADMIN — PACLITAXEL 152 MILLIGRAM(S): 6 INJECTION, SOLUTION, CONCENTRATE INTRAVENOUS at 14:52

## 2022-09-07 ENCOUNTER — APPOINTMENT (OUTPATIENT)
Dept: INFUSION THERAPY | Facility: CLINIC | Age: 72
End: 2022-09-07

## 2022-09-07 ENCOUNTER — APPOINTMENT (OUTPATIENT)
Dept: HEMATOLOGY ONCOLOGY | Facility: CLINIC | Age: 72
End: 2022-09-07

## 2022-09-07 VITALS
HEIGHT: 65 IN | OXYGEN SATURATION: 98 % | TEMPERATURE: 97.7 F | HEART RATE: 77 BPM | BODY MASS INDEX: 26.33 KG/M2 | WEIGHT: 158 LBS | DIASTOLIC BLOOD PRESSURE: 80 MMHG | SYSTOLIC BLOOD PRESSURE: 164 MMHG

## 2022-09-07 VITALS — BODY MASS INDEX: 31.65 KG/M2 | HEIGHT: 65 IN | WEIGHT: 190 LBS

## 2022-09-07 DIAGNOSIS — Z51.11 ENCOUNTER FOR ANTINEOPLASTIC CHEMOTHERAPY: ICD-10-CM

## 2022-09-07 LAB
ALBUMIN SERPL ELPH-MCNC: 4.1 G/DL
ALBUMIN SERPL ELPH-MCNC: 4.2 G/DL
ALBUMIN SERPL ELPH-MCNC: 4.3 G/DL
ALBUMIN SERPL ELPH-MCNC: 4.3 G/DL
ALBUMIN SERPL ELPH-MCNC: 4.5 G/DL
ALP BLD-CCNC: 64 U/L
ALP BLD-CCNC: 67 U/L
ALP BLD-CCNC: 69 U/L
ALP BLD-CCNC: 70 U/L
ALP BLD-CCNC: 72 U/L
ALP BLD-CCNC: 75 U/L
ALP BLD-CCNC: 95 U/L
ALT SERPL-CCNC: 11 U/L
ALT SERPL-CCNC: 11 U/L
ALT SERPL-CCNC: 12 U/L
ALT SERPL-CCNC: 12 U/L
ALT SERPL-CCNC: 15 U/L
ALT SERPL-CCNC: 15 U/L
ALT SERPL-CCNC: 8 U/L
ANION GAP SERPL CALC-SCNC: 10 MMOL/L
ANION GAP SERPL CALC-SCNC: 10 MMOL/L
ANION GAP SERPL CALC-SCNC: 11 MMOL/L
ANION GAP SERPL CALC-SCNC: 12 MMOL/L
ANION GAP SERPL CALC-SCNC: 13 MMOL/L
ANION GAP SERPL CALC-SCNC: 13 MMOL/L
ANION GAP SERPL CALC-SCNC: 14 MMOL/L
AST SERPL-CCNC: 12 U/L
AST SERPL-CCNC: 14 U/L
AST SERPL-CCNC: 14 U/L
AST SERPL-CCNC: 15 U/L
AST SERPL-CCNC: 16 U/L
BASOPHILS # BLD AUTO: 0.07 K/UL
BASOPHILS NFR BLD AUTO: 1.4 %
BILIRUB DIRECT SERPL-MCNC: 0.2 MG/DL
BILIRUB DIRECT SERPL-MCNC: <0.2 MG/DL
BILIRUB INDIRECT SERPL-MCNC: 0.4 MG/DL
BILIRUB INDIRECT SERPL-MCNC: >0.1 MG/DL
BILIRUB INDIRECT SERPL-MCNC: >0.2 MG/DL
BILIRUB INDIRECT SERPL-MCNC: >0.3 MG/DL
BILIRUB SERPL-MCNC: 0.3 MG/DL
BILIRUB SERPL-MCNC: 0.4 MG/DL
BILIRUB SERPL-MCNC: 0.5 MG/DL
BILIRUB SERPL-MCNC: 0.6 MG/DL
BILIRUB SERPL-MCNC: 1 MG/DL
BUN SERPL-MCNC: 17 MG/DL
BUN SERPL-MCNC: 18 MG/DL
BUN SERPL-MCNC: 19 MG/DL
BUN SERPL-MCNC: 22 MG/DL
CALCIUM SERPL-MCNC: 8.6 MG/DL
CALCIUM SERPL-MCNC: 8.7 MG/DL
CALCIUM SERPL-MCNC: 8.8 MG/DL
CALCIUM SERPL-MCNC: 8.9 MG/DL
CALCIUM SERPL-MCNC: 8.9 MG/DL
CALCIUM SERPL-MCNC: 9 MG/DL
CALCIUM SERPL-MCNC: 9.1 MG/DL
CHLORIDE SERPL-SCNC: 100 MMOL/L
CHLORIDE SERPL-SCNC: 101 MMOL/L
CHLORIDE SERPL-SCNC: 101 MMOL/L
CHLORIDE SERPL-SCNC: 102 MMOL/L
CHLORIDE SERPL-SCNC: 103 MMOL/L
CHLORIDE SERPL-SCNC: 107 MMOL/L
CHLORIDE SERPL-SCNC: 107 MMOL/L
CO2 SERPL-SCNC: 25 MMOL/L
CO2 SERPL-SCNC: 25 MMOL/L
CO2 SERPL-SCNC: 26 MMOL/L
CO2 SERPL-SCNC: 27 MMOL/L
CO2 SERPL-SCNC: 28 MMOL/L
CREAT SERPL-MCNC: 0.7 MG/DL
CREAT SERPL-MCNC: 0.9 MG/DL
EGFR: 68 ML/MIN/1.73M2
EGFR: 92 ML/MIN/1.73M2
EOSINOPHIL # BLD AUTO: 0.09 K/UL
EOSINOPHIL NFR BLD AUTO: 1.8 %
GLUCOSE SERPL-MCNC: 103 MG/DL
GLUCOSE SERPL-MCNC: 104 MG/DL
GLUCOSE SERPL-MCNC: 112 MG/DL
GLUCOSE SERPL-MCNC: 125 MG/DL
GLUCOSE SERPL-MCNC: 153 MG/DL
GLUCOSE SERPL-MCNC: 91 MG/DL
GLUCOSE SERPL-MCNC: 92 MG/DL
HCT VFR BLD CALC: 31.5 %
HCT VFR BLD CALC: 31.5 %
HCT VFR BLD CALC: 31.7 %
HCT VFR BLD CALC: 32.9 %
HCT VFR BLD CALC: 32.9 %
HCT VFR BLD CALC: 33 %
HCT VFR BLD CALC: 34 %
HCT VFR BLD CALC: 34.4 %
HCT VFR BLD CALC: 34.7 %
HCT VFR BLD CALC: 34.9 %
HCT VFR BLD CALC: 35.2 %
HCT VFR BLD CALC: 36.8 %
HGB BLD-MCNC: 10.9 G/DL
HGB BLD-MCNC: 10.9 G/DL
HGB BLD-MCNC: 11 G/DL
HGB BLD-MCNC: 11.3 G/DL
HGB BLD-MCNC: 11.3 G/DL
HGB BLD-MCNC: 11.4 G/DL
HGB BLD-MCNC: 11.5 G/DL
HGB BLD-MCNC: 11.7 G/DL
HGB BLD-MCNC: 12.2 G/DL
HGB BLD-MCNC: 12.4 G/DL
IMM GRANULOCYTES NFR BLD AUTO: 2 %
LYMPHOCYTES # BLD AUTO: 0.66 K/UL
LYMPHOCYTES NFR BLD AUTO: 13.1 %
MAN DIFF?: NORMAL
MCHC RBC-ENTMCNC: 30.8 PG
MCHC RBC-ENTMCNC: 31.3 PG
MCHC RBC-ENTMCNC: 31.9 PG
MCHC RBC-ENTMCNC: 31.9 PG
MCHC RBC-ENTMCNC: 32.5 PG
MCHC RBC-ENTMCNC: 32.8 PG
MCHC RBC-ENTMCNC: 32.9 PG
MCHC RBC-ENTMCNC: 33.1 G/DL
MCHC RBC-ENTMCNC: 33.1 PG
MCHC RBC-ENTMCNC: 33.2 G/DL
MCHC RBC-ENTMCNC: 33.2 PG
MCHC RBC-ENTMCNC: 33.7 G/DL
MCHC RBC-ENTMCNC: 33.8 PG
MCHC RBC-ENTMCNC: 34 G/DL
MCHC RBC-ENTMCNC: 34.3 G/DL
MCHC RBC-ENTMCNC: 34.3 G/DL
MCHC RBC-ENTMCNC: 34.4 G/DL
MCHC RBC-ENTMCNC: 34.5 G/DL
MCHC RBC-ENTMCNC: 34.6 G/DL
MCHC RBC-ENTMCNC: 34.6 G/DL
MCHC RBC-ENTMCNC: 34.7 G/DL
MCHC RBC-ENTMCNC: 35 G/DL
MCV RBC AUTO: 89.6 FL
MCV RBC AUTO: 90.3 FL
MCV RBC AUTO: 92.1 FL
MCV RBC AUTO: 92.9 FL
MCV RBC AUTO: 95.1 FL
MCV RBC AUTO: 95.2 FL
MCV RBC AUTO: 96.3 FL
MCV RBC AUTO: 96.6 FL
MCV RBC AUTO: 96.7 FL
MCV RBC AUTO: 97.2 FL
MCV RBC AUTO: 98.6 FL
MCV RBC AUTO: 99.1 FL
MONOCYTES # BLD AUTO: 0.47 K/UL
MONOCYTES NFR BLD AUTO: 9.3 %
NEUTROPHILS # BLD AUTO: 3.65 K/UL
NEUTROPHILS NFR BLD AUTO: 72.4 %
PLATELET # BLD AUTO: 168 K/UL
PLATELET # BLD AUTO: 179 K/UL
PLATELET # BLD AUTO: 190 K/UL
PLATELET # BLD AUTO: 206 K/UL
PLATELET # BLD AUTO: 225 K/UL
PLATELET # BLD AUTO: 242 K/UL
PLATELET # BLD AUTO: 242 K/UL
PLATELET # BLD AUTO: 256 K/UL
PLATELET # BLD AUTO: 261 K/UL
PLATELET # BLD AUTO: 266 K/UL
PLATELET # BLD AUTO: 272 K/UL
PLATELET # BLD AUTO: 289 K/UL
PMV BLD: 10.4 FL
PMV BLD: 10.4 FL
PMV BLD: 10.8 FL
PMV BLD: 10.8 FL
PMV BLD: 10.9 FL
PMV BLD: 11 FL
PMV BLD: 11.1 FL
PMV BLD: 11.7 FL
POTASSIUM SERPL-SCNC: 3.3 MMOL/L
POTASSIUM SERPL-SCNC: 3.4 MMOL/L
POTASSIUM SERPL-SCNC: 3.6 MMOL/L
POTASSIUM SERPL-SCNC: 3.7 MMOL/L
POTASSIUM SERPL-SCNC: 3.8 MMOL/L
POTASSIUM SERPL-SCNC: 3.8 MMOL/L
POTASSIUM SERPL-SCNC: 3.9 MMOL/L
PROT SERPL-MCNC: 5.8 G/DL
PROT SERPL-MCNC: 5.9 G/DL
PROT SERPL-MCNC: 6 G/DL
PROT SERPL-MCNC: 6.1 G/DL
PROT SERPL-MCNC: 6.1 G/DL
PROT SERPL-MCNC: 6.2 G/DL
PROT SERPL-MCNC: 6.3 G/DL
RBC # BLD: 3.31 M/UL
RBC # BLD: 3.42 M/UL
RBC # BLD: 3.47 M/UL
RBC # BLD: 3.5 M/UL
RBC # BLD: 3.51 M/UL
RBC # BLD: 3.52 M/UL
RBC # BLD: 3.54 M/UL
RBC # BLD: 3.54 M/UL
RBC # BLD: 3.57 M/UL
RBC # BLD: 3.61 M/UL
RBC # BLD: 3.67 M/UL
RBC # BLD: 3.82 M/UL
RBC # FLD: 15.1 %
RBC # FLD: 15.2 %
RBC # FLD: 15.5 %
RBC # FLD: 15.9 %
RBC # FLD: 16 %
RBC # FLD: 17 %
RBC # FLD: 17.7 %
RBC # FLD: 18.8 %
RBC # FLD: 19.5 %
RBC # FLD: 19.6 %
RBC # FLD: 19.7 %
RBC # FLD: 21.2 %
SODIUM SERPL-SCNC: 139 MMOL/L
SODIUM SERPL-SCNC: 139 MMOL/L
SODIUM SERPL-SCNC: 140 MMOL/L
SODIUM SERPL-SCNC: 141 MMOL/L
SODIUM SERPL-SCNC: 141 MMOL/L
SODIUM SERPL-SCNC: 142 MMOL/L
SODIUM SERPL-SCNC: 147 MMOL/L
WBC # FLD AUTO: 2.14 K/UL
WBC # FLD AUTO: 2.79 K/UL
WBC # FLD AUTO: 3.26 K/UL
WBC # FLD AUTO: 3.44 K/UL
WBC # FLD AUTO: 3.49 K/UL
WBC # FLD AUTO: 3.6 K/UL
WBC # FLD AUTO: 3.98 K/UL
WBC # FLD AUTO: 4.13 K/UL
WBC # FLD AUTO: 4.37 K/UL
WBC # FLD AUTO: 5.04 K/UL
WBC # FLD AUTO: 5.28 K/UL
WBC # FLD AUTO: 9.63 K/UL

## 2022-09-07 PROCEDURE — 99215 OFFICE O/P EST HI 40 MIN: CPT

## 2022-09-07 RX ORDER — PACLITAXEL 6 MG/ML
152 INJECTION, SOLUTION, CONCENTRATE INTRAVENOUS ONCE
Refills: 0 | Status: COMPLETED | OUTPATIENT
Start: 2022-09-07 | End: 2022-09-07

## 2022-09-07 RX ORDER — DIPHENHYDRAMINE HCL 50 MG
50 CAPSULE ORAL ONCE
Refills: 0 | Status: COMPLETED | OUTPATIENT
Start: 2022-09-07 | End: 2022-09-07

## 2022-09-07 RX ORDER — DEXAMETHASONE 0.5 MG/5ML
10 ELIXIR ORAL ONCE
Refills: 0 | Status: COMPLETED | OUTPATIENT
Start: 2022-09-07 | End: 2022-09-07

## 2022-09-07 RX ORDER — FAMOTIDINE 10 MG/ML
20 INJECTION INTRAVENOUS ONCE
Refills: 0 | Status: COMPLETED | OUTPATIENT
Start: 2022-09-07 | End: 2022-09-07

## 2022-09-07 RX ADMIN — FAMOTIDINE 104 MILLIGRAM(S): 10 INJECTION INTRAVENOUS at 13:14

## 2022-09-07 RX ADMIN — Medication 102 MILLIGRAM(S): at 13:13

## 2022-09-07 RX ADMIN — PACLITAXEL 152 MILLIGRAM(S): 6 INJECTION, SOLUTION, CONCENTRATE INTRAVENOUS at 14:10

## 2022-09-07 RX ADMIN — Medication 118 MILLIGRAM(S): at 13:13

## 2022-09-11 PROBLEM — Z51.11 ENCOUNTER FOR ANTINEOPLASTIC CHEMOTHERAPY: Status: ACTIVE | Noted: 2022-04-29

## 2022-09-11 NOTE — ASSESSMENT
[FreeTextEntry1] : 72 yo female has synchronous colon cancer and breast cancer:\par - Invasive moderately differentiated adenocarcinoma of the sigmoid colon, MMR proficient, s/p robotic sigmoidectomy and colostomy. 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 Clinical stage is cT3N1.\par \par Assessment and Plan:\par #  Invasive well to moderately differentiated ductal carcinoma of the right breast, ER positive, CO and Her-2 negative, clinical stage cT3N1.\par -- Completed  AC x 4. Currently on weekly Taxol  today (12/12 cycles today). She will have last week Taxol. \par -- Breast exam today still reveals residual mass in the right breast. Will refer her repeat b/l breast MRI for re-evaluation and CT c/a/p for re-staging. \par -- CBC from today reviewed. Blood counts are adequate for chemo. BMP and LFTs reviewed from 7/25/22.\par -- In light of estrogen receptor positive breast cancer, she will be benefit with adjuvant endocrine therapy. Letrozole is recommended. The side effects were reviewed which may include but not limit to muscular and skeletal aching, arthralgia, loss of bone density, osteopenia and osteoporosis, a small increase risk of cardiovascular events and slightly increase of hyperlipidemia. She understands and agrees to take the pill. A script was sent to her pharmacy. She will start in 1 week.\par -- Discussed bone health management. Advised to take calcium and vitamin D and  regular exercise. Bone density referral provided. \par -- Follow up with breast surgeon to discuss breast surgery. \par \par # IIA ([id number]) adenocarcinoma of the sigmoid colon, MMR proficient, s/p robotic sigmoidectomy. \par -- The pathology report reviewed. She has a couple of high risk factors including bowel obstruction and positive lymphovascular invasion but 22 lymph node were negative. Benefit adjuvant adjuvant chemotherapy is likely to be small and she may forgo it. In addition, she needs to move onto her breast cancer treatment. \par -- Should have surveillance colonoscopy in 6 m after surgery which was on 2/17/22. She will do colonoscopy after she finishes Taxol. Follow up with GI. \par \par RTC 4 weeks.\par \par Pt seen and above plan discussed with Dr Daniel.\par

## 2022-09-11 NOTE — PHYSICAL EXAM
[Fully active, able to carry on all pre-disease performance without restriction] : Status 0 - Fully active, able to carry on all pre-disease performance without restriction [Normal] : affect appropriate [de-identified] : Persistent large right breast mass and size reduced. No palpable right axillary lymph node.

## 2022-09-11 NOTE — HISTORY OF PRESENT ILLNESS
[de-identified] : 72 yo female was referred by Dr. Quinonez for consultation of breast cancer. Jesica initially presented to hospital for constipation and abdominal pain. CT a/p on 11/24/21 showed  large bowel obstruction secondary to a likely malignant stricture in the proximal sigmoid colon, small volume ascites and lobulated, partially visualized right breast mass and left breast nodule. Physical exam correlation and correlation with dedicated breast imaging was recommended as malignancy is of concern.\par \par On 11/26/21, CT chest showed right and left breast masses. The right breast mass measured 6.5 x 3.8 x 7.0 cm and contained small calcifications. The left breast mass measured 3.1 x 2.9 x 2.6 cm.\par \par On 11/29/21, colonoscopy revealed sigmoid colon mass and and biopsy showed invasive adenocarcinoma, moderately differentiated. MMR proteins were all expressed. 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 \par \par On 12/21/21, b/l breast dx mammo and US were performed which showed 10 cm mass at the 11 to 1:00 position 3 to 4 cm from the nipple corresponding to the biopsy-proven right breast carcinoma.  In the right axilla, at the 11:00 position 13 cm from the nipple, there is an abnormal lymph node measuring 2.3 x 1.3 x 0.9 cm. Ultrasound-guided biopsy is recommended. In the left breast, there are scattered similar appearing subcentimeter circumscribed masses seen throughout the left breast. These correspond to mammographically demonstrated masses:\par At the 3:00 position 6 cm from the nipple, there is a mass measuring 0.4 x 0.3 x 0.3 cm. Ultrasound-guided biopsy of this mass is recommended.\par At the 7 to 8:00 position 5 cm from the nipple, there is a mass measuring 0.4 x 0.3 x 0.2 cm.\par At the 8 to 9:00 position 4 cm from the nipple, there is a circumscribed mass measuring 0.4 x 0.4 x 0.3 cm.\par At the 8 to 9:00 position 3 cm from nipple, corresponding to area palpable concern and mammographic findings, there is a heterogeneous mass measuring 2.5 x 2.3 x 2.0 cm. Ultrasound-guided biopsy is recommended.\par \par On 12/30/21, left breast 3 N6 mass, ultrasound guided needle core biopsies revealed radial sclerosing lesion (radial scar) and Benign atrophic breast tissue with proliferative type fibrocystic changes. Left breast 8-9 N3 mass, ultrasound guided needle core biopsies showed single minute fragment of benign atrophic breast tissue.  \par The right axillary mass, ultrasound guided needle core biopsies were positive for moderately differentiated adenocarcinoma, most likely representative of metastasis from the patient's known mammary primary carcinoma to an axillary lymph node with mervat replacement, obliteration, and extensive extracapsular extension.\par \par On 1/19/22, left breast relatively posterior calcifications, stereotactic guided vacuum assisted needle core biopsies showed radial sclerosing lesion (radial scar) and benign atrophic breast tissue with proliferative type fibrocystic changes. Left relatively anterior calcifications, stereotactic guided vacuum assisted needle core biopsies showed benign atrophic breast tissue with proliferative type fibrocystic changes.  \par \par On 1/5/22, she had b/l breast MRI which showed 8 cm biopsy-proven right breast index carcinoma and axillary mervat metastasis. Biopsy-proven left breast radial scar with no additional sites of suspicious enhancement.\par \par On 2/17/22, she underwent robotic sigmoidectomy and colostomy. The pathology revealed Invasive moderately differentiated adenocarcinoma (6 cm in greatest dimension), invading through muscularis propria into pericolorectal tissue (pT3), +LVI.  All margins were negative for invasive carcinoma.  Twenty two lymph nodes were negative for metastatic carcinoma. She recovered well from surgery. \par \par She is here today with her  to discuss breast cancer treatment. She has no family h/o breast cancer or any other kinds of cancer.  [de-identified] : 4/21/22:susanne Mooney is here today for followup and chemotherapy. She has synchronous colon cancer and breast cancer: invasive moderately differentiated adenocarcinoma of the sigmoid colon, MMR proficient, s/p robotic sigmoidectomy and colostomy. 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 Clinical stage is cT3N1. Her staging CT and bone scan did not show evidence of distant mets. She is here today to start neoadjuvant chemotherapy for breast cancer. \par \par 5/9/22:susanne Mooney is here today for followup and chemotherapy. She has synchronous colon cancer and breast cancer: invasive moderately differentiated adenocarcinoma of the sigmoid colon, MMR proficient, s/p robotic sigmoidectomy and colostomy. 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 Clinical stage is cT3N1. Her staging CT and bone scan did not show evidence of distant mets. She started on neoadjuvant chemotherapy for breast cancer. She had 1st cycle AC 2 weeks ago and tolerated well. \par \par 5/23/22susanne Mooney is here today for followup and chemotherapy. She has synchronous colon cancer and breast cancer: invasive moderately differentiated adenocarcinoma of the sigmoid colon, MMR proficient, s/p robotic sigmoidectomy and colostomy. 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 Clinical stage is cT3N1. Her staging CT and bone scan did not show evidence of distant mets. She started on neoadjuvant chemotherapy. To date, she has had 2 cycles and tolerated well. She did not have fever, mouth sore, vomiting or diarrhea. \par \par 6/6/22susanne Mooney is here today for followup and chemotherapy. She has synchronous colon cancer and breast cancer: invasive moderately differentiated adenocarcinoma of the sigmoid colon, MMR proficient, s/p robotic sigmoidectomy and colostomy. 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 Clinical stage is cT3N1. Her staging CT and bone scan did not show evidence of distant mets. She has been on neoadjuvant chemotherapy with dose dense AC. To date, she has had 3 cycles and tolerated well. She did not have fever, mouth sore, vomiting or diarrhea. She does endorse some burning/tearing sensation in the eye. \par \par 6/20/22:susanne Mooney is here today for followup and chemotherapy. She has synchronous colon cancer and breast cancer: invasive moderately differentiated adenocarcinoma of the sigmoid colon, MMR proficient, s/p robotic sigmoidectomy and colostomy. 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 Clinical stage is cT3N1. Her staging CT and bone scan did not show evidence of distant mets. She has been on neoadjuvant chemotherapy. She completed dose dense AC x 4. She feels more fatigue. Her eye burning is getting better.\par \par 7/11/22susanne Mooney is here for follow up and chemotherapy.  She has synchronous colon cancer and breast cancer: invasive moderately differentiated adenocarcinoma of the sigmoid colon, MMR proficient, s/p robotic sigmoidectomy and colostomy. 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 Clinical stage is cT3N1. Her staging CT and bone scan did not show evidence of distant mets. She has been on neoadjuvant chemotherapy. She completed dose dense AC x 4. She completed 3 doses of weekly Taxol so far. She feels well. Her energy level is better and burning of the eyes is resolved. She followed up with Colorectal [facility] and they want to wait until Taxol is finished for her chemotherapy.\par \par 09/7/22suasnne Mooney is here for follow up and chemotherapy.  She has synchronous colon cancer and breast cancer: invasive moderately differentiated adenocarcinoma of the sigmoid colon, MMR proficient, s/p robotic sigmoidectomy and colostomy. 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 Clinical stage is cT3N1. Her staging CT and bone scan did not show evidence of distant mets. She has been on neoadjuvant chemotherapy. She completed dose dense AC x 4. Currently, she is on weekly Taxol. To date, she has had 11 treatments. She tolerated chemotherapy well.

## 2022-09-12 NOTE — ED PROVIDER NOTE - ATTESTATION, MLM
Results are a bit elevated with certain tests that agree with possibility of Paget's disease. It appears that [facility] has tried to reach her but no voicemail is set up - please provide their phone number so she can get scheduled with them. Also she needs to get the bone scan done as well - provide number to radiology scheduling since they have likely tried to reach her unsuccessfully as well.
I have reviewed and agree with the initial nursing note, except as documented in my note.

## 2022-09-19 ENCOUNTER — RESULT REVIEW (OUTPATIENT)
Age: 72
End: 2022-09-19

## 2022-09-19 ENCOUNTER — OUTPATIENT (OUTPATIENT)
Dept: OUTPATIENT SERVICES | Facility: HOSPITAL | Age: 72
LOS: 1 days | Discharge: HOME | End: 2022-09-19

## 2022-09-19 DIAGNOSIS — Z90.49 ACQUIRED ABSENCE OF OTHER SPECIFIED PARTS OF DIGESTIVE TRACT: Chronic | ICD-10-CM

## 2022-09-19 DIAGNOSIS — C50.211 MALIGNANT NEOPLASM OF UPPER-INNER QUADRANT OF RIGHT FEMALE BREAST: ICD-10-CM

## 2022-09-19 PROCEDURE — 74177 CT ABD & PELVIS W/CONTRAST: CPT | Mod: 26,MH

## 2022-09-19 PROCEDURE — 71260 CT THORAX DX C+: CPT | Mod: 26,MH

## 2022-09-21 ENCOUNTER — APPOINTMENT (OUTPATIENT)
Dept: SURGERY | Facility: CLINIC | Age: 72
End: 2022-09-21

## 2022-09-21 VITALS
HEIGHT: 65 IN | TEMPERATURE: 97.8 F | BODY MASS INDEX: 30.82 KG/M2 | DIASTOLIC BLOOD PRESSURE: 88 MMHG | HEART RATE: 91 BPM | WEIGHT: 185 LBS | OXYGEN SATURATION: 98 % | SYSTOLIC BLOOD PRESSURE: 158 MMHG

## 2022-09-21 PROCEDURE — 99213 OFFICE O/P EST LOW 20 MIN: CPT

## 2022-09-21 NOTE — REASON FOR VISIT
[Initial Evaluation] : an initial evaluation [Spouse] : spouse [FreeTextEntry1] : Right breast cancer

## 2022-09-21 NOTE — HISTORY OF PRESENT ILLNESS
[de-identified] :  the patient comes with her  for further management of her biopsy-proven right breast cancer.  She was diagnosed with a cT3N1 invasive ductal carcinoma of the right breast, stage IIIa, estrogen receptor positive, HER negative, Ki-67 3%.  A biopsy of an axillary lymph node showed extracapsular extension.  She began treatment for this disease with neoadjuvant therapy but developed a large bowel obstruction and her treatment was interrupted by an emergency diverting colostomy last November followed by sigmoid colon resection and colostomy closure by Dr. Fritz in February of this year.  The obstructing lesion was sigmoid cancer, T3N0, MMR proficient and she did not receive chemotherapy for that lesion.  She just finished her last dose of Taxol approximately 2 weeks ago and comes to discuss definitive surgical management.\par \par During her work-up she had 2 stereotactic biopsies of the left breast for microcalcifications 1 of which was benign and not in need of excision, but the mo more posterior area showed a radial scar for which excision is advised.  This was designated with an hourglass clip.\par \par She believes the primary breast lesion has decreased in size.  She just completed a CT scan of the chest abdomen pelvis and is scheduled for breast MRI tomorrow, a bone density exam is scheduled for next week and she will be reevaluated thereafter by Dr. OROSCO.  She was just recently started on letrozole

## 2022-09-21 NOTE — PHYSICAL EXAM
[Normal Thyroid] : the thyroid was normal [Normal Breath Sounds] : Normal breath sounds [No HSM] : no hepatosplenomegaly [de-identified] :   Somewhat obese but otherwise healthy in appearance, with alopecia [de-identified] :  no cervical or supraclavicular lymphadenopathy [de-identified] :  irregular, not tachycardic, no murmurs [de-identified] :  large and asymmetric, with the right breast being larger, longstanding as per the patient.  No nipple discharge, nipple retraction, suspicious skin changes noted bilaterally.  Visible bulge right upper breast.    Palpable mass above the right areola measures 8 x 7 cm, not fixed to skin or chest wall, mobile and nontender.  No other lesions palpable in either breast.  No palpable axillary adenopathy bilaterally.    Port-A-Cath in position left upper anterior chest.

## 2022-09-21 NOTE — ASSESSMENT
[FreeTextEntry1] :  cT3N1 IDC right breast, with favorable biomarker profile,   but with minimal response to  neoadjuvant chemotherapy and extracapsular extension on lymph node biopsy.  She also has a left breast radial scar for which excisional biopsy will be needed.  We will  await the results of her CT scans and upcoming breast MRI,  and barring the need for any further work-up, we should proceed with a right modified radical mastectomy and left breast excisional biopsy with preop RF localizer.  The details of these procedures were discussed in full.  I do not believe that breast preservation or immediate reconstruction are appropriate, and formal axillary node dissection is advised in light of the JESSICA.  Immediate reconstruction is not advised in light of her high local recurrence risk and the likely need for postop chest wall radiotherapy.  The case will also be discussed with the medical oncologist prior to any definitive management.  All their questions were answered and they understand and agree.

## 2022-09-22 ENCOUNTER — RESULT REVIEW (OUTPATIENT)
Age: 72
End: 2022-09-22

## 2022-09-22 ENCOUNTER — OUTPATIENT (OUTPATIENT)
Dept: OUTPATIENT SERVICES | Facility: HOSPITAL | Age: 72
LOS: 1 days | Discharge: HOME | End: 2022-09-22

## 2022-09-22 DIAGNOSIS — Z51.11 ENCOUNTER FOR ANTINEOPLASTIC CHEMOTHERAPY: ICD-10-CM

## 2022-09-22 DIAGNOSIS — Z90.49 ACQUIRED ABSENCE OF OTHER SPECIFIED PARTS OF DIGESTIVE TRACT: Chronic | ICD-10-CM

## 2022-09-22 PROCEDURE — 77049 MRI BREAST C-+ W/CAD BI: CPT | Mod: 26

## 2022-09-25 ENCOUNTER — OUTPATIENT (OUTPATIENT)
Dept: OUTPATIENT SERVICES | Facility: HOSPITAL | Age: 72
LOS: 1 days | Discharge: HOME | End: 2022-09-25

## 2022-09-25 ENCOUNTER — RESULT REVIEW (OUTPATIENT)
Age: 72
End: 2022-09-25

## 2022-09-25 DIAGNOSIS — Z90.49 ACQUIRED ABSENCE OF OTHER SPECIFIED PARTS OF DIGESTIVE TRACT: Chronic | ICD-10-CM

## 2022-09-25 DIAGNOSIS — K76.9 LIVER DISEASE, UNSPECIFIED: ICD-10-CM

## 2022-09-25 PROCEDURE — 74183 MRI ABD W/O CNTR FLWD CNTR: CPT | Mod: 26,MH

## 2022-09-27 ENCOUNTER — OUTPATIENT (OUTPATIENT)
Dept: OUTPATIENT SERVICES | Facility: HOSPITAL | Age: 72
LOS: 1 days | Discharge: HOME | End: 2022-09-27

## 2022-09-27 ENCOUNTER — RESULT REVIEW (OUTPATIENT)
Age: 72
End: 2022-09-27

## 2022-09-27 DIAGNOSIS — Z90.49 ACQUIRED ABSENCE OF OTHER SPECIFIED PARTS OF DIGESTIVE TRACT: Chronic | ICD-10-CM

## 2022-09-29 DIAGNOSIS — N95.9 UNSPECIFIED MENOPAUSAL AND PERIMENOPAUSAL DISORDER: ICD-10-CM

## 2022-09-29 DIAGNOSIS — Z13.820 ENCOUNTER FOR SCREENING FOR OSTEOPOROSIS: ICD-10-CM

## 2022-10-03 ENCOUNTER — APPOINTMENT (OUTPATIENT)
Dept: INTERVENTIONAL RADIOLOGY/VASCULAR | Facility: CLINIC | Age: 72
End: 2022-10-03
Payer: MEDICARE

## 2022-10-03 VITALS
WEIGHT: 185 LBS | SYSTOLIC BLOOD PRESSURE: 161 MMHG | HEIGHT: 65 IN | TEMPERATURE: 97.3 F | DIASTOLIC BLOOD PRESSURE: 95 MMHG | BODY MASS INDEX: 30.82 KG/M2 | HEART RATE: 99 BPM

## 2022-10-03 PROCEDURE — XXXXX: CPT

## 2022-10-05 ENCOUNTER — APPOINTMENT (OUTPATIENT)
Dept: HEMATOLOGY ONCOLOGY | Facility: CLINIC | Age: 72
End: 2022-10-05

## 2022-10-05 ENCOUNTER — APPOINTMENT (OUTPATIENT)
Dept: INFUSION THERAPY | Facility: CLINIC | Age: 72
End: 2022-10-05

## 2022-10-05 ENCOUNTER — OUTPATIENT (OUTPATIENT)
Dept: OUTPATIENT SERVICES | Facility: HOSPITAL | Age: 72
LOS: 1 days | Discharge: HOME | End: 2022-10-05

## 2022-10-05 VITALS
SYSTOLIC BLOOD PRESSURE: 160 MMHG | RESPIRATION RATE: 16 BRPM | DIASTOLIC BLOOD PRESSURE: 90 MMHG | WEIGHT: 185 LBS | HEART RATE: 90 BPM | OXYGEN SATURATION: 98 % | BODY MASS INDEX: 30.82 KG/M2 | TEMPERATURE: 97.1 F | HEIGHT: 65 IN

## 2022-10-05 DIAGNOSIS — C18.7 MALIGNANT NEOPLASM OF SIGMOID COLON: ICD-10-CM

## 2022-10-05 DIAGNOSIS — C50.919 MALIGNANT NEOPLASM OF UNSPECIFIED SITE OF UNSPECIFIED FEMALE BREAST: ICD-10-CM

## 2022-10-05 DIAGNOSIS — Z90.49 ACQUIRED ABSENCE OF OTHER SPECIFIED PARTS OF DIGESTIVE TRACT: Chronic | ICD-10-CM

## 2022-10-05 DIAGNOSIS — Z51.11 ENCOUNTER FOR ANTINEOPLASTIC CHEMOTHERAPY: ICD-10-CM

## 2022-10-05 PROCEDURE — 99214 OFFICE O/P EST MOD 30 MIN: CPT

## 2022-10-07 ENCOUNTER — OUTPATIENT (OUTPATIENT)
Dept: OUTPATIENT SERVICES | Facility: HOSPITAL | Age: 72
LOS: 1 days | Discharge: HOME | End: 2022-10-07

## 2022-10-07 VITALS
SYSTOLIC BLOOD PRESSURE: 170 MMHG | HEIGHT: 65 IN | WEIGHT: 184.97 LBS | DIASTOLIC BLOOD PRESSURE: 80 MMHG | HEART RATE: 73 BPM | TEMPERATURE: 96 F | OXYGEN SATURATION: 100 % | RESPIRATION RATE: 14 BRPM

## 2022-10-07 DIAGNOSIS — Z01.818 ENCOUNTER FOR OTHER PREPROCEDURAL EXAMINATION: ICD-10-CM

## 2022-10-07 DIAGNOSIS — Z90.49 ACQUIRED ABSENCE OF OTHER SPECIFIED PARTS OF DIGESTIVE TRACT: Chronic | ICD-10-CM

## 2022-10-07 LAB
ALBUMIN SERPL ELPH-MCNC: 4.6 G/DL — SIGNIFICANT CHANGE UP (ref 3.5–5.2)
ALP SERPL-CCNC: 69 U/L — SIGNIFICANT CHANGE UP (ref 30–115)
ALT FLD-CCNC: 9 U/L — SIGNIFICANT CHANGE UP (ref 0–41)
ANION GAP SERPL CALC-SCNC: 14 MMOL/L — SIGNIFICANT CHANGE UP (ref 7–14)
APTT BLD: 28.8 SEC — SIGNIFICANT CHANGE UP (ref 27–39.2)
AST SERPL-CCNC: 14 U/L — SIGNIFICANT CHANGE UP (ref 0–41)
BASOPHILS # BLD AUTO: 0.05 K/UL — SIGNIFICANT CHANGE UP (ref 0–0.2)
BASOPHILS NFR BLD AUTO: 0.6 % — SIGNIFICANT CHANGE UP (ref 0–1)
BILIRUB SERPL-MCNC: 0.6 MG/DL — SIGNIFICANT CHANGE UP (ref 0.2–1.2)
BUN SERPL-MCNC: 20 MG/DL — SIGNIFICANT CHANGE UP (ref 10–20)
CALCIUM SERPL-MCNC: 9.8 MG/DL — SIGNIFICANT CHANGE UP (ref 8.4–10.5)
CHLORIDE SERPL-SCNC: 100 MMOL/L — SIGNIFICANT CHANGE UP (ref 98–110)
CO2 SERPL-SCNC: 25 MMOL/L — SIGNIFICANT CHANGE UP (ref 17–32)
CREAT SERPL-MCNC: 0.8 MG/DL — SIGNIFICANT CHANGE UP (ref 0.7–1.5)
EGFR: 79 ML/MIN/1.73M2 — SIGNIFICANT CHANGE UP
EOSINOPHIL # BLD AUTO: 0.25 K/UL — SIGNIFICANT CHANGE UP (ref 0–0.7)
EOSINOPHIL NFR BLD AUTO: 3.2 % — SIGNIFICANT CHANGE UP (ref 0–8)
GLUCOSE SERPL-MCNC: 126 MG/DL — HIGH (ref 70–99)
HCT VFR BLD CALC: 43.4 % — SIGNIFICANT CHANGE UP (ref 37–47)
HGB BLD-MCNC: 14.2 G/DL — SIGNIFICANT CHANGE UP (ref 12–16)
IMM GRANULOCYTES NFR BLD AUTO: 0.5 % — HIGH (ref 0.1–0.3)
INR BLD: 1.04 RATIO — SIGNIFICANT CHANGE UP (ref 0.65–1.3)
LYMPHOCYTES # BLD AUTO: 0.69 K/UL — LOW (ref 1.2–3.4)
LYMPHOCYTES # BLD AUTO: 8.7 % — LOW (ref 20.5–51.1)
MCHC RBC-ENTMCNC: 30.6 PG — SIGNIFICANT CHANGE UP (ref 27–31)
MCHC RBC-ENTMCNC: 32.7 G/DL — SIGNIFICANT CHANGE UP (ref 32–37)
MCV RBC AUTO: 93.5 FL — SIGNIFICANT CHANGE UP (ref 81–99)
MONOCYTES # BLD AUTO: 0.48 K/UL — SIGNIFICANT CHANGE UP (ref 0.1–0.6)
MONOCYTES NFR BLD AUTO: 6.1 % — SIGNIFICANT CHANGE UP (ref 1.7–9.3)
NEUTROPHILS # BLD AUTO: 6.4 K/UL — SIGNIFICANT CHANGE UP (ref 1.4–6.5)
NEUTROPHILS NFR BLD AUTO: 80.9 % — HIGH (ref 42.2–75.2)
NRBC # BLD: 0 /100 WBCS — SIGNIFICANT CHANGE UP (ref 0–0)
PLATELET # BLD AUTO: 233 K/UL — SIGNIFICANT CHANGE UP (ref 130–400)
POTASSIUM SERPL-MCNC: 4.2 MMOL/L — SIGNIFICANT CHANGE UP (ref 3.5–5)
POTASSIUM SERPL-SCNC: 4.2 MMOL/L — SIGNIFICANT CHANGE UP (ref 3.5–5)
PROT SERPL-MCNC: 6.5 G/DL — SIGNIFICANT CHANGE UP (ref 6–8)
PROTHROM AB SERPL-ACNC: 11.9 SEC — SIGNIFICANT CHANGE UP (ref 9.95–12.87)
RBC # BLD: 4.64 M/UL — SIGNIFICANT CHANGE UP (ref 4.2–5.4)
RBC # FLD: 14.6 % — HIGH (ref 11.5–14.5)
SODIUM SERPL-SCNC: 139 MMOL/L — SIGNIFICANT CHANGE UP (ref 135–146)
WBC # BLD: 7.91 K/UL — SIGNIFICANT CHANGE UP (ref 4.8–10.8)
WBC # FLD AUTO: 7.91 K/UL — SIGNIFICANT CHANGE UP (ref 4.8–10.8)

## 2022-10-07 PROCEDURE — 93010 ELECTROCARDIOGRAM REPORT: CPT

## 2022-10-07 NOTE — H&P PST ADULT - REASON FOR ADMISSION
Case Type: OP Non-block TimeSuite: Interventional RadiologyProceduralist: Christian Aponte  Confirmed Surgery DateTime: 10-  Procedure: Liver Bx  Laterality: N/ALength of Procedure: 60 Minutes  Anesthesia Type: Local Standby

## 2022-10-07 NOTE — H&P PST ADULT - HISTORY OF PRESENT ILLNESS
72 yo female presents for PAST in preparation for liver biopsy   Pt complains of abnormal MRI test results. Pt was diagnosed with right breast and colon cancer on 11/24/21 and had a follow up CT scan and an MRI done. Results demonstrate   Hyperenhancing right hepatic lobe lesion, demonstrating restricted diffusion, indeterminate. Metastasis is on differential. Consider tissue sampling.   Denies any chest pain, difficulty breathing, SOB, palpitations, dysuria, URI, or any other infections in the last 2 weeks/1 month. Denies any recent travel, contact, or exposure to any persons with known or suspected COVID-19. Pt also denies COVID testing within the last 2 weeks. Pt advised to self quarantine until day of procedure. Exercise tolerance of 2-3 flights of stairs without dyspnea. DON reviewed with patient.  Anesthesia Alert  NO--Difficult Airway  NO--History of neck surgery or radiation  NO--Limited ROM of neck  NO--History of Malignant hyperthermia  NO--Personal or family history of Pseudocholinesterase deficiency.  NO--Prior Anesthesia Complication  NO--Latex Allergy  NO--Loose teeth  NO--History of Rheumatoid Arthritis  NO--DON  NO--Bleeding risk  NO--Other_____   written and verbal instructions with teach back on chlorhexidine shampoo provided,  pt verbalized understanding with returned demonstration  Patient verbalized understanding of instructions and was given the opportunity to ask questions and have them answered.

## 2022-10-07 NOTE — H&P PST ADULT - PSYCHIATRIC
normal/normal affect/alert and oriented x3/normal behavior normal/normal affect/alert and oriented x3/normal behavior/anxious

## 2022-10-07 NOTE — H&P PST ADULT - NSICDXPASTSURGICALHX_GEN_ALL_CORE_FT
PAST SURGICAL HISTORY:  History of colon resection 2/17/2022     PAST SURGICAL HISTORY:  History of colon resection 2/17/2022  Resection trans colon  resection of sigmoid colon

## 2022-10-07 NOTE — H&P PST ADULT - NSICDXPASTMEDICALHX_GEN_ALL_CORE_FT
PAST MEDICAL HISTORY:  Breast cancer recently dx 11/24/ 2021 no tx    Cancer, colon dx 11/24/2021 s/p chemo last tx 9/7/22    HTN (hypertension)      PAST MEDICAL HISTORY:  Breast cancer recently dx 11/24/ 2021 no tx    Cancer, colon dx 11/24/2021 s/p chemo last tx 9/7/22    HTN (hypertension)     Obese

## 2022-10-10 ENCOUNTER — LABORATORY RESULT (OUTPATIENT)
Age: 72
End: 2022-10-10

## 2022-10-10 DIAGNOSIS — K76.9 LIVER DISEASE, UNSPECIFIED: ICD-10-CM

## 2022-10-11 ENCOUNTER — APPOINTMENT (OUTPATIENT)
Dept: SURGERY | Facility: CLINIC | Age: 72
End: 2022-10-11

## 2022-10-11 VITALS
TEMPERATURE: 97.3 F | HEIGHT: 65 IN | BODY MASS INDEX: 30.82 KG/M2 | OXYGEN SATURATION: 98 % | SYSTOLIC BLOOD PRESSURE: 166 MMHG | DIASTOLIC BLOOD PRESSURE: 88 MMHG | WEIGHT: 185 LBS | HEART RATE: 69 BPM

## 2022-10-11 PROBLEM — E66.9 OBESITY, UNSPECIFIED: Chronic | Status: ACTIVE | Noted: 2022-10-07

## 2022-10-11 PROBLEM — C50.919 MALIGNANT NEOPLASM OF UNSPECIFIED SITE OF UNSPECIFIED FEMALE BREAST: Chronic | Status: ACTIVE | Noted: 2022-02-03

## 2022-10-11 PROBLEM — C18.9 MALIGNANT NEOPLASM OF COLON, UNSPECIFIED: Chronic | Status: ACTIVE | Noted: 2022-02-03

## 2022-10-11 PROCEDURE — 99213 OFFICE O/P EST LOW 20 MIN: CPT

## 2022-10-11 NOTE — ASSESSMENT
[FreeTextEntry1] : 71F with concurrent breast and obstructing colon cancer s/p diverting loop colostomy now s/p robotic sigmoidectomy and colostomy closure with T3N0 cancer with LVI\par \par Patient has healed well.  We will follow up her liver biopsy results.  She is pending breast surgery.  We will see her back in 2 months.

## 2022-10-11 NOTE — PHYSICAL EXAM
[Abdomen Masses] : No abdominal masses [Abdomen Tenderness] : ~T No ~M abdominal tenderness [No HSM] : no hepatosplenomegaly [Respiratory Effort] : normal respiratory effort [Normal Rate and Rhythm] : normal rate and rhythm [de-identified] : external examination shows healed incisions.  RUQ stoma site healed. No erythema induration or purulence [de-identified] : awake, alert and in no acute distress

## 2022-10-13 ENCOUNTER — OUTPATIENT (OUTPATIENT)
Dept: OUTPATIENT SERVICES | Facility: HOSPITAL | Age: 72
LOS: 1 days | Discharge: HOME | End: 2022-10-13

## 2022-10-13 ENCOUNTER — TRANSCRIPTION ENCOUNTER (OUTPATIENT)
Age: 72
End: 2022-10-13

## 2022-10-13 ENCOUNTER — RESULT REVIEW (OUTPATIENT)
Age: 72
End: 2022-10-13

## 2022-10-13 VITALS
TEMPERATURE: 99 F | SYSTOLIC BLOOD PRESSURE: 190 MMHG | HEIGHT: 65 IN | RESPIRATION RATE: 22 BRPM | DIASTOLIC BLOOD PRESSURE: 90 MMHG | HEART RATE: 95 BPM | WEIGHT: 184.97 LBS | OXYGEN SATURATION: 97 %

## 2022-10-13 VITALS — OXYGEN SATURATION: 98 % | RESPIRATION RATE: 18 BRPM | TEMPERATURE: 97 F | HEART RATE: 87 BPM

## 2022-10-13 DIAGNOSIS — Z90.49 ACQUIRED ABSENCE OF OTHER SPECIFIED PARTS OF DIGESTIVE TRACT: Chronic | ICD-10-CM

## 2022-10-13 DIAGNOSIS — K76.9 LIVER DISEASE, UNSPECIFIED: ICD-10-CM

## 2022-10-13 PROCEDURE — 88305 TISSUE EXAM BY PATHOLOGIST: CPT | Mod: 26

## 2022-10-13 PROCEDURE — 88313 SPECIAL STAINS GROUP 2: CPT | Mod: 26

## 2022-10-13 PROCEDURE — 77012 CT SCAN FOR NEEDLE BIOPSY: CPT | Mod: 26

## 2022-10-13 PROCEDURE — 47000 NEEDLE BIOPSY OF LIVER PERQ: CPT

## 2022-10-13 PROCEDURE — 88341 IMHCHEM/IMCYTCHM EA ADD ANTB: CPT | Mod: 26

## 2022-10-13 PROCEDURE — 88333 PATH CONSLTJ SURG CYTO XM 1: CPT | Mod: 26

## 2022-10-13 PROCEDURE — 88342 IMHCHEM/IMCYTCHM 1ST ANTB: CPT | Mod: 26

## 2022-10-13 NOTE — ASU PATIENT PROFILE, ADULT - NSICDXPASTSURGICALHX_GEN_ALL_CORE_FT
PAST SURGICAL HISTORY:  History of colon resection 2/17/2022  Resection trans colon  resection of sigmoid colon

## 2022-10-13 NOTE — ASU PATIENT PROFILE, ADULT - NSICDXPASTMEDICALHX_GEN_ALL_CORE_FT
PAST MEDICAL HISTORY:  Breast cancer recently dx 11/24/ 2021 no tx    Cancer, colon dx 11/24/2021 s/p chemo last tx 9/7/22    HTN (hypertension)     Obese

## 2022-10-13 NOTE — PROGRESS NOTE ADULT - SUBJECTIVE AND OBJECTIVE BOX
Interventional Radiology Outpatient Documentation    PREOPERATIVE DAY OF PROCEDURE EVALUATION:     I have personally seen and examined this patient. I agree with the history and physical which I have reviewed and noted any changes below:     Plan is for CT-guided biopsy of right liver lesion with anesthesia.      Procedure/ risks/ benefits/ goals/ alternatives were explained. All questions answered. Informed content obtained from patient. Consent placed in chart.     POSTOPERATIVE PROCEDURAL EVALUATION:     Procedure: Biopsy of right liver lesion    Pre-Op Diagnosis: History of colon/breast CA with liver lesion     Post-Op Diagnosis: Same    Attending: Fadi  Resident: None    Anesthesia (type):  [ ] General Anesthesia  [ x] Sedation administered by Anesthesia  [ ] Spinal Anesthesia  [ x] Local/Regional    Contrast: 175 cc IV contrast     Estimated Blood Loss: Minimal, < 5 cc    Condition:   [ ] Critical  [ ] Serious  [ ] Fair   [x ] Good    Findings/Follow up Plan of Care:  Successful CT-guided biopsy of right liver mass.  Multiple cores reviewed by pathology on site.      See full report in pacs    Complications: None    Disposition: Recovery

## 2022-10-13 NOTE — ASU DISCHARGE PLAN (ADULT/PEDIATRIC) - NS MD DC FALL RISK RISK
For information on Fall & Injury Prevention, visit: [email or web address] OR  [email or web address] OR  https://www.cdc.gov/steadi/patient.html

## 2022-10-15 NOTE — ASSESSMENT
[FreeTextEntry1] : 70 yo female has synchronous colon cancer and breast cancer:\par - Invasive moderately differentiated adenocarcinoma of the sigmoid colon, MMR proficient, s/p robotic sigmoidectomy and colostomy. 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 Clinical stage is cT3N1.\par \par Assessment and Plan:\par #  Invasive well to moderately differentiated ductal carcinoma of the right breast, ER positive, DE and Her-2 negative, clinical stage cT3N1.\par -- Completed  AC x 4 and weekly Taxol x 12. \par -- Breast MRI Unchanged biopsy-proven malignant right breast mass with associated skin thickening. Interval reduction in size of prior noted prominent right axillary lymph node likely treatment related. No MRI evidence of malignancy in the left breast.\par -- Restaging CT C/A/P showed Unchanged bilateral few pulmonary nodules measuring up to 4 mm. Interval decrease in size of previously enlarged right axillary lymph node. Indeterminate hyperenhancing 1.5 cm right hepatic lobe lesion. MRI abdomen showed Hyperenhancing right hepatic lobe lesion, demonstrating restricted diffusion, indeterminate. Metastasis is on differential. She was referred to IR for CT guided live biopsy. \par -- She will continue Letrozole.\par -- Bone density was normal. Advised to continue calcium and vitamin D and  regular exercise. \par -- Follow up with breast surgeon to discuss breast surgery. \par \par # IIA ([id number]) adenocarcinoma of the sigmoid colon, MMR proficient, s/p robotic sigmoidectomy. \par -- The pathology report reviewed. She has a couple of high risk factors including bowel obstruction and positive lymphovascular invasion but 22 lymph node were negative. Benefit adjuvant adjuvant chemotherapy is likely to be small and she may forgo it. In addition, she needs to move onto her breast cancer treatment. \par -- Should have surveillance colonoscopy in 6 m after surgery which was on 2/17/22. Follow up with GI on 10/11/22. \par \par RTC 4 weeks.\par \par Pt seen and above plan discussed with Dr Daniel.\par

## 2022-10-15 NOTE — PHYSICAL EXAM
[Fully active, able to carry on all pre-disease performance without restriction] : Status 0 - Fully active, able to carry on all pre-disease performance without restriction [Normal] : affect appropriate [de-identified] : Persistent large right breast mass and size reduced. No palpable right axillary lymph node.

## 2022-10-15 NOTE — HISTORY OF PRESENT ILLNESS
[de-identified] : 72 yo female was referred by Dr. Quinonez for consultation of breast cancer. Jesica initially presented to hospital for constipation and abdominal pain. CT a/p on 11/24/21 showed  large bowel obstruction secondary to a likely malignant stricture in the proximal sigmoid colon, small volume ascites and lobulated, partially visualized right breast mass and left breast nodule. Physical exam correlation and correlation with dedicated breast imaging was recommended as malignancy is of concern.\par \par On 11/26/21, CT chest showed right and left breast masses. The right breast mass measured 6.5 x 3.8 x 7.0 cm and contained small calcifications. The left breast mass measured 3.1 x 2.9 x 2.6 cm.\par \par On 11/29/21, colonoscopy revealed sigmoid colon mass and and biopsy showed invasive adenocarcinoma, moderately differentiated. MMR proteins were all expressed. \par \par On 11/26/21, right breast mass core biopsy showed Invasive well to moderately differentiated ductal carcinoma with dominant mucinous features (colloid carcinoma), ER pos %, NC negative, Ki 67 3% and her-2 negative (2+ by IHC and FISH ration 1.4). \par \par On 12/21/21, b/l breast dx mammo and US were performed which showed 10 cm mass at the 11 to 1:00 position 3 to 4 cm from the nipple corresponding to the biopsy-proven right breast carcinoma.  In the right axilla, at the 11:00 position 13 cm from the nipple, there is an abnormal lymph node measuring 2.3 x 1.3 x 0.9 cm. Ultrasound-guided biopsy is recommended. In the left breast, there are scattered similar appearing subcentimeter circumscribed masses seen throughout the left breast. These correspond to mammographically demonstrated masses:\par At the 3:00 position 6 cm from the nipple, there is a mass measuring 0.4 x 0.3 x 0.3 cm. Ultrasound-guided biopsy of this mass is recommended.\par At the 7 to 8:00 position 5 cm from the nipple, there is a mass measuring 0.4 x 0.3 x 0.2 cm.\par At the 8 to 9:00 position 4 cm from the nipple, there is a circumscribed mass measuring 0.4 x 0.4 x 0.3 cm.\par At the 8 to 9:00 position 3 cm from nipple, corresponding to area palpable concern and mammographic findings, there is a heterogeneous mass measuring 2.5 x 2.3 x 2.0 cm. Ultrasound-guided biopsy is recommended.\par \par On 12/30/21, left breast 3 N6 mass, ultrasound guided needle core biopsies revealed radial sclerosing lesion (radial scar) and Benign atrophic breast tissue with proliferative type fibrocystic changes. Left breast 8-9 N3 mass, ultrasound guided needle core biopsies showed single minute fragment of benign atrophic breast tissue.  \par The right axillary mass, ultrasound guided needle core biopsies were positive for moderately differentiated adenocarcinoma, most likely representative of metastasis from the patient's known mammary primary carcinoma to an axillary lymph node with mervat replacement, obliteration, and extensive extracapsular extension.\par \par On 1/19/22, left breast relatively posterior calcifications, stereotactic guided vacuum assisted needle core biopsies showed radial sclerosing lesion (radial scar) and benign atrophic breast tissue with proliferative type fibrocystic changes. Left relatively anterior calcifications, stereotactic guided vacuum assisted needle core biopsies showed benign atrophic breast tissue with proliferative type fibrocystic changes.  \par \par On 1/5/22, she had b/l breast MRI which showed 8 cm biopsy-proven right breast index carcinoma and axillary mervat metastasis. Biopsy-proven left breast radial scar with no additional sites of suspicious enhancement.\par \par On 2/17/22, she underwent robotic sigmoidectomy and colostomy. The pathology revealed Invasive moderately differentiated adenocarcinoma (6 cm in greatest dimension), invading through muscularis propria into pericolorectal tissue (pT3), +LVI.  All margins were negative for invasive carcinoma.  Twenty two lymph nodes were negative for metastatic carcinoma. She recovered well from surgery. \par \par She is here today with her  to discuss breast cancer treatment. She has no family h/o breast cancer or any other kinds of cancer.  [de-identified] : 4/21/22:susanne Mooney is here today for followup and chemotherapy. She has synchronous colon cancer and breast cancer: invasive moderately differentiated adenocarcinoma of the sigmoid colon, MMR proficient, s/p robotic sigmoidectomy and colostomy. 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 Clinical stage is cT3N1. Her staging CT and bone scan did not show evidence of distant mets. She is here today to start neoadjuvant chemotherapy for breast cancer. \par \par 5/9/22:susanne Mooney is here today for followup and chemotherapy. She has synchronous colon cancer and breast cancer: invasive moderately differentiated adenocarcinoma of the sigmoid colon, MMR proficient, s/p robotic sigmoidectomy and colostomy. 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 Clinical stage is cT3N1. Her staging CT and bone scan did not show evidence of distant mets. She started on neoadjuvant chemotherapy for breast cancer. She had 1st cycle AC 2 weeks ago and tolerated well. \par \par 5/23/22susanne Mooney is here today for followup and chemotherapy. She has synchronous colon cancer and breast cancer: invasive moderately differentiated adenocarcinoma of the sigmoid colon, MMR proficient, s/p robotic sigmoidectomy and colostomy. 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 Clinical stage is cT3N1. Her staging CT and bone scan did not show evidence of distant mets. She started on neoadjuvant chemotherapy. To date, she has had 2 cycles and tolerated well. She did not have fever, mouth sore, vomiting or diarrhea. \par \par 6/6/22susanne Mooney is here today for followup and chemotherapy. She has synchronous colon cancer and breast cancer: invasive moderately differentiated adenocarcinoma of the sigmoid colon, MMR proficient, s/p robotic sigmoidectomy and colostomy. 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 Clinical stage is cT3N1. Her staging CT and bone scan did not show evidence of distant mets. She has been on neoadjuvant chemotherapy with dose dense AC. To date, she has had 3 cycles and tolerated well. She did not have fever, mouth sore, vomiting or diarrhea. She does endorse some burning/tearing sensation in the eye. \par \par 6/20/22:susanne Mooney is here today for followup and chemotherapy. She has synchronous colon cancer and breast cancer: invasive moderately differentiated adenocarcinoma of the sigmoid colon, MMR proficient, s/p robotic sigmoidectomy and colostomy. 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 Clinical stage is cT3N1. Her staging CT and bone scan did not show evidence of distant mets. She has been on neoadjuvant chemotherapy. She completed dose dense AC x 4. She feels more fatigue. Her eye burning is getting better.\par \par 7/11/22susanne Mooney is here for follow up and chemotherapy.  She has synchronous colon cancer and breast cancer: invasive moderately differentiated adenocarcinoma of the sigmoid colon, MMR proficient, s/p robotic sigmoidectomy and colostomy. 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 Clinical stage is cT3N1. Her staging CT and bone scan did not show evidence of distant mets. She has been on neoadjuvant chemotherapy. She completed dose dense AC x 4. She completed 3 doses of weekly Taxol so far. She feels well. Her energy level is better and burning of the eyes is resolved. She followed up with Colorectal sx and they want to wait until Taxol is finished for her chemotherapy.\par \par 09/7/22susanne Mooney is here for follow up and chemotherapy.  She has synchronous colon cancer and breast cancer: invasive moderately differentiated adenocarcinoma of the sigmoid colon, MMR proficient, s/p robotic sigmoidectomy and colostomy. 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 Clinical stage is cT3N1. Her staging CT and bone scan did not show evidence of distant mets. She has been on neoadjuvant chemotherapy. She completed dose dense AC x 4. Currently, she is on weekly Taxol. To date, she has had 11 treatments. She tolerated chemotherapy well. \par \par 10/5/22\svitlana Mooney is here for follow up. She has synchronous colon cancer and breast cancer: invasive moderately differentiated adenocarcinoma of the sigmoid colon, MMR proficient, s/p robotic sigmoidectomy and colostomy. 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 Clinical stage is cT3N1. Her staging CT and bone scan did not show evidence of distant mets. She completed neoadjuvant chemotherapy with dose dense AC x 4 and weekly Taxol x 12. She started letrozole on 9/15 and tolerating well. She had breast MRI 9/22 showed biopsy-proven malignant right breast mass with associated skin thickening. Interval reduction in size of prior noted prominent right axillary lymph node likely treatment related. No MRI evidence of malignancy in the left breast. She also had restaging CT C/A/P which showed Unchanged bilateral few pulmonary nodules measuring up to 4 mm. Interval decrease in size of previously enlarged right axillary lymph node. Indeterminate hyperenhancing 1.5 cm right hepatic lobe lesion. MRI abdomen on 9/25/22 showed Hyperenhancing right hepatic lobe lesion, demonstrating restricted diffusion, indeterminate. Metastasis is on differential. She is pending biopsy of liver lesion.

## 2022-10-18 DIAGNOSIS — K76.9 LIVER DISEASE, UNSPECIFIED: ICD-10-CM

## 2022-10-18 DIAGNOSIS — Z51.81 ENCOUNTER FOR THERAPEUTIC DRUG LEVEL MONITORING: ICD-10-CM

## 2022-10-25 LAB — NON-GYNECOLOGICAL CYTOLOGY STUDY: SIGNIFICANT CHANGE UP

## 2022-10-27 ENCOUNTER — NON-APPOINTMENT (OUTPATIENT)
Age: 72
End: 2022-10-27

## 2022-10-28 NOTE — HISTORY OF PRESENT ILLNESS
86 y.o. F dx w LBO due to colon mass s/p R colectomy post op day 3. Doing well post surgical standpoint, however postoperative course complicated by dyspnea with right middle lobe atelectasis.     1) awaiting BM  2) Bladder scans for urinary retention  3) Appreciate pulmonary input for respiratory disease  4) Continue pain control  5) NPO due to UGI sx and gas bubble seen on imaging  6) continue metoprolol if BP stable   [de-identified] : 70 yo female was referred by Dr. Quinonez for consultation of breast cancer. Jesica initially presented to hospital for constipation and abdominal pain. CT a/p on 11/24/21 showed  large bowel obstruction secondary to a likely malignant stricture in the proximal sigmoid colon, small volume ascites and lobulated, partially visualized right breast mass and left breast nodule. Physical exam correlation and correlation with dedicated breast imaging was recommended as malignancy is of concern.\par \par On 11/26/21, CT chest showed right and left breast masses. The right breast mass measured 6.5 x 3.8 x 7.0 cm and contained small calcifications. The left breast mass measured 3.1 x 2.9 x 2.6 cm.\par \par On 11/29/21, colonoscopy revealed sigmoid colon mass and and biopsy showed invasive adenocarcinoma, moderately differentiated. MMR proteins were all expressed. \par \par On 11/26/21, right breast mass core biopsy showed Invasive well to moderately differentiated ductal carcinoma with dominant mucinous features (colloid carcinoma), ER pos %, UT negative, Ki 67 3% and her-2 negative (2+ by IHC and FISH ration 1.4). \par \par On 12/21/21, b/l breast dx mammo and US were performed which showed 10 cm mass at the 11 to 1:00 position 3 to 4 cm from the nipple corresponding to the biopsy-proven right breast carcinoma.  In the right axilla, at the 11:00 position 13 cm from the nipple, there is an abnormal lymph node measuring 2.3 x 1.3 x 0.9 cm. Ultrasound-guided biopsy is recommended. In the left breast, there are scattered similar appearing subcentimeter circumscribed masses seen throughout the left breast. These correspond to mammographically demonstrated masses:\par At the 3:00 position 6 cm from the nipple, there is a mass measuring 0.4 x 0.3 x 0.3 cm. Ultrasound-guided biopsy of this mass is recommended.\par At the 7 to 8:00 position 5 cm from the nipple, there is a mass measuring 0.4 x 0.3 x 0.2 cm.\par At the 8 to 9:00 position 4 cm from the nipple, there is a circumscribed mass measuring 0.4 x 0.4 x 0.3 cm.\par At the 8 to 9:00 position 3 cm from nipple, corresponding to area palpable concern and mammographic findings, there is a heterogeneous mass measuring 2.5 x 2.3 x 2.0 cm. Ultrasound-guided biopsy is recommended.\par \par On 12/30/21, left breast 3 N6 mass, ultrasound guided needle core biopsies revealed radial sclerosing lesion (radial scar) and Benign atrophic breast tissue with proliferative type fibrocystic changes. Left breast 8-9 N3 mass, ultrasound guided needle core biopsies showed single minute fragment of benign atrophic breast tissue.  \par The right axillary mass, ultrasound guided needle core biopsies were positive for moderately differentiated adenocarcinoma, most likely representative of metastasis from the patient's known mammary primary carcinoma to an axillary lymph node with mervat replacement, obliteration, and extensive extracapsular extension.\par \par On 1/19/22, left breast relatively posterior calcifications, stereotactic guided vacuum assisted needle core biopsies showed radial sclerosing lesion (radial scar) and benign atrophic breast tissue with proliferative type fibrocystic changes. Left relatively anterior calcifications, stereotactic guided vacuum assisted needle core biopsies showed benign atrophic breast tissue with proliferative type fibrocystic changes.  \par \par On 1/5/22, she had b/l breast MRI which showed 8 cm biopsy-proven right breast index carcinoma and axillary mervat metastasis. Biopsy-proven left breast radial scar with no additional sites of suspicious enhancement.\par \par On 2/17/22, she underwent robotic sigmoidectomy and colostomy. The pathology revealed Invasive moderately differentiated adenocarcinoma (6 cm in greatest dimension), invading through muscularis propria into pericolorectal tissue (pT3), +LVI.  All margins were negative for invasive carcinoma.  Twenty two lymph nodes were negative for metastatic carcinoma. She recovered well from surgery. \par \par She is here today with her  to discuss breast cancer treatment. She has no family h/o breast cancer or any other kinds of cancer.  [de-identified] : 4/21/22:susanne Mooney is here today for followup and chemotherapy. She has synchronous colon cancer and breast cancer: invasive moderately differentiated adenocarcinoma of the sigmoid colon, MMR proficient, s/p robotic sigmoidectomy and colostomy. 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 Clinical stage is cT3N1. Her staging CT and bone scan did not show evidence of distant mets. She is here today to start neoadjuvant chemotherapy for breast cancer. \par \par 5/9/22:susanne Mooney is here today for followup and chemotherapy. She has synchronous colon cancer and breast cancer: invasive moderately differentiated adenocarcinoma of the sigmoid colon, MMR proficient, s/p robotic sigmoidectomy and colostomy. 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 Clinical stage is cT3N1. Her staging CT and bone scan did not show evidence of distant mets. She started on neoadjuvant chemotherapy for breast cancer. She had 1st cycle AC 2 weeks ago and tolerated well. \par \par 5/23/22susanne Mooney is here today for followup and chemotherapy. She has synchronous colon cancer and breast cancer: invasive moderately differentiated adenocarcinoma of the sigmoid colon, MMR proficient, s/p robotic sigmoidectomy and colostomy. 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 Clinical stage is cT3N1. Her staging CT and bone scan did not show evidence of distant mets. She started on neoadjuvant chemotherapy. To date, she has had 2 cycles and tolerated well. She did not have fever, mouth sore, vomiting or diarrhea. \par \par 6/6/22susanne Mooney is here today for followup and chemotherapy. She has synchronous colon cancer and breast cancer: invasive moderately differentiated adenocarcinoma of the sigmoid colon, MMR proficient, s/p robotic sigmoidectomy and colostomy. 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 Clinical stage is cT3N1. Her staging CT and bone scan did not show evidence of distant mets. She has been on neoadjuvant chemotherapy with dose dense AC. To date, she has had 3 cycles and tolerated well. She did not have fever, mouth sore, vomiting or diarrhea. She does endorse some burning/tearing sensation in the eye. \par \par 6/20/22:susanne Mooney is here today for followup and chemotherapy. She has synchronous colon cancer and breast cancer: invasive moderately differentiated adenocarcinoma of the sigmoid colon, MMR proficient, s/p robotic sigmoidectomy and colostomy. 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 Clinical stage is cT3N1. Her staging CT and bone scan did not show evidence of distant mets. She has been on neoadjuvant chemotherapy. She completed dose dense AC x 4. She feels more fatigue. Her eye burning is getting better.\par \par 7/11/22susanne Mooney is here for follow up and chemotherapy.  She has synchronous colon cancer and breast cancer: invasive moderately differentiated adenocarcinoma of the sigmoid colon, MMR proficient, s/p robotic sigmoidectomy and colostomy. 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 Clinical stage is cT3N1. Her staging CT and bone scan did not show evidence of distant mets. She has been on neoadjuvant chemotherapy. She completed dose dense AC x 4. She completed 3 doses of weekly Taxol so far. She feels well. Her energy level is better and burning of the eyes is resolved. She followed up with Colorectal sx and they want to wait until Taxol is finished for her chemotherapy.\par \par 8/8/22susanne Mooney is here for follow up and chemotherapy.  She has synchronous colon cancer and breast cancer: invasive moderately differentiated adenocarcinoma of the sigmoid colon, MMR proficient, s/p robotic sigmoidectomy and colostomy. 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 Clinical stage is cT3N1. Her staging CT and bone scan did not show evidence of distant mets. She has been on neoadjuvant chemotherapy. She completed dose dense AC x 4. She completed 7 doses of weekly Taxol so far. She feels well. Her energy level is better and burning of the eyes is resolved. She followed up with Colorectal sx and they want to wait until Taxol is finished for her chemotherapy.

## 2022-11-02 ENCOUNTER — APPOINTMENT (OUTPATIENT)
Dept: INFUSION THERAPY | Facility: CLINIC | Age: 72
End: 2022-11-02

## 2022-11-02 ENCOUNTER — APPOINTMENT (OUTPATIENT)
Dept: HEMATOLOGY ONCOLOGY | Facility: CLINIC | Age: 72
End: 2022-11-02

## 2022-11-02 VITALS
RESPIRATION RATE: 18 BRPM | SYSTOLIC BLOOD PRESSURE: 184 MMHG | DIASTOLIC BLOOD PRESSURE: 97 MMHG | HEIGHT: 65 IN | TEMPERATURE: 97.8 F | BODY MASS INDEX: 31.65 KG/M2 | HEART RATE: 96 BPM | WEIGHT: 190 LBS | OXYGEN SATURATION: 99 %

## 2022-11-02 PROCEDURE — 99214 OFFICE O/P EST MOD 30 MIN: CPT

## 2022-11-05 NOTE — PHYSICAL EXAM
[Fully active, able to carry on all pre-disease performance without restriction] : Status 0 - Fully active, able to carry on all pre-disease performance without restriction [Normal] : affect appropriate [de-identified] : Persistent large right breast mass and size reduced. No palpable right axillary lymph node.

## 2022-11-05 NOTE — HISTORY OF PRESENT ILLNESS
[de-identified] : 72 yo female was referred by Dr. Quinonez for consultation of breast cancer. Jesica initially presented to hospital for constipation and abdominal pain. CT a/p on 11/24/21 showed  large bowel obstruction secondary to a likely malignant stricture in the proximal sigmoid colon, small volume ascites and lobulated, partially visualized right breast mass and left breast nodule. Physical exam correlation and correlation with dedicated breast imaging was recommended as malignancy is of concern.\par \par On 11/26/21, CT chest showed right and left breast masses. The right breast mass measured 6.5 x 3.8 x 7.0 cm and contained small calcifications. The left breast mass measured 3.1 x 2.9 x 2.6 cm.\par \par On 11/29/21, colonoscopy revealed sigmoid colon mass and and biopsy showed invasive adenocarcinoma, moderately differentiated. MMR proteins were all expressed. 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 \par \par On 12/21/21, b/l breast dx mammo and US were performed which showed 10 cm mass at the 11 to 1:00 position 3 to 4 cm from the nipple corresponding to the biopsy-proven right breast carcinoma.  In the right axilla, at the 11:00 position 13 cm from the nipple, there is an abnormal lymph node measuring 2.3 x 1.3 x 0.9 cm. Ultrasound-guided biopsy is recommended. In the left breast, there are scattered similar appearing subcentimeter circumscribed masses seen throughout the left breast. These correspond to mammographically demonstrated masses:\par At the 3:00 position 6 cm from the nipple, there is a mass measuring 0.4 x 0.3 x 0.3 cm. Ultrasound-guided biopsy of this mass is recommended.\par At the 7 to 8:00 position 5 cm from the nipple, there is a mass measuring 0.4 x 0.3 x 0.2 cm.\par At the 8 to 9:00 position 4 cm from the nipple, there is a circumscribed mass measuring 0.4 x 0.4 x 0.3 cm.\par At the 8 to 9:00 position 3 cm from nipple, corresponding to area palpable concern and mammographic findings, there is a heterogeneous mass measuring 2.5 x 2.3 x 2.0 cm. Ultrasound-guided biopsy is recommended.\par \par On 12/30/21, left breast 3 N6 mass, ultrasound guided needle core biopsies revealed radial sclerosing lesion (radial scar) and Benign atrophic breast tissue with proliferative type fibrocystic changes. Left breast 8-9 N3 mass, ultrasound guided needle core biopsies showed single minute fragment of benign atrophic breast tissue.  \par The right axillary mass, ultrasound guided needle core biopsies were positive for moderately differentiated adenocarcinoma, most likely representative of metastasis from the patient's known mammary primary carcinoma to an axillary lymph node with mervat replacement, obliteration, and extensive extracapsular extension.\par \par On 1/19/22, left breast relatively posterior calcifications, stereotactic guided vacuum assisted needle core biopsies showed radial sclerosing lesion (radial scar) and benign atrophic breast tissue with proliferative type fibrocystic changes. Left relatively anterior calcifications, stereotactic guided vacuum assisted needle core biopsies showed benign atrophic breast tissue with proliferative type fibrocystic changes.  \par \par On 1/5/22, she had b/l breast MRI which showed 8 cm biopsy-proven right breast index carcinoma and axillary mervat metastasis. Biopsy-proven left breast radial scar with no additional sites of suspicious enhancement.\par \par On 2/17/22, she underwent robotic sigmoidectomy and colostomy. The pathology revealed Invasive moderately differentiated adenocarcinoma (6 cm in greatest dimension), invading through muscularis propria into pericolorectal tissue (pT3), +LVI.  All margins were negative for invasive carcinoma.  Twenty two lymph nodes were negative for metastatic carcinoma. She recovered well from surgery. \par \par She is here today with her  to discuss breast cancer treatment. She has no family h/o breast cancer or any other kinds of cancer.  [de-identified] : 4/21/22:susanne Mooney is here today for followup and chemotherapy. She has synchronous colon cancer and breast cancer: invasive moderately differentiated adenocarcinoma of the sigmoid colon, MMR proficient, s/p robotic sigmoidectomy and colostomy. 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 Clinical stage is cT3N1. Her staging CT and bone scan did not show evidence of distant mets. She is here today to start neoadjuvant chemotherapy for breast cancer. \par \par 5/9/22:susanne Mooney is here today for followup and chemotherapy. She has synchronous colon cancer and breast cancer: invasive moderately differentiated adenocarcinoma of the sigmoid colon, MMR proficient, s/p robotic sigmoidectomy and colostomy. 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 Clinical stage is cT3N1. Her staging CT and bone scan did not show evidence of distant mets. She started on neoadjuvant chemotherapy for breast cancer. She had 1st cycle AC 2 weeks ago and tolerated well. \par \par 5/23/22susanne Mooney is here today for followup and chemotherapy. She has synchronous colon cancer and breast cancer: invasive moderately differentiated adenocarcinoma of the sigmoid colon, MMR proficient, s/p robotic sigmoidectomy and colostomy. 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 Clinical stage is cT3N1. Her staging CT and bone scan did not show evidence of distant mets. She started on neoadjuvant chemotherapy. To date, she has had 2 cycles and tolerated well. She did not have fever, mouth sore, vomiting or diarrhea. \par \par 6/6/22susanne Mooney is here today for followup and chemotherapy. She has synchronous colon cancer and breast cancer: invasive moderately differentiated adenocarcinoma of the sigmoid colon, MMR proficient, s/p robotic sigmoidectomy and colostomy. 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 Clinical stage is cT3N1. Her staging CT and bone scan did not show evidence of distant mets. She has been on neoadjuvant chemotherapy with dose dense AC. To date, she has had 3 cycles and tolerated well. She did not have fever, mouth sore, vomiting or diarrhea. She does endorse some burning/tearing sensation in the eye. \par \par 6/20/22:susanne Mooney is here today for followup and chemotherapy. She has synchronous colon cancer and breast cancer: invasive moderately differentiated adenocarcinoma of the sigmoid colon, MMR proficient, s/p robotic sigmoidectomy and colostomy. 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 Clinical stage is cT3N1. Her staging CT and bone scan did not show evidence of distant mets. She has been on neoadjuvant chemotherapy. She completed dose dense AC x 4. She feels more fatigue. Her eye burning is getting better.\par \par 7/11/22susanne Mooney is here for follow up and chemotherapy.  She has synchronous colon cancer and breast cancer: invasive moderately differentiated adenocarcinoma of the sigmoid colon, MMR proficient, s/p robotic sigmoidectomy and colostomy. 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 Clinical stage is cT3N1. Her staging CT and bone scan did not show evidence of distant mets. She has been on neoadjuvant chemotherapy. She completed dose dense AC x 4. She completed 3 doses of weekly Taxol so far. She feels well. Her energy level is better and burning of the eyes is resolved. She followed up with [facility]orectal [facility] and they want to wait until Taxol is finished for her chemotherapy.\par \par 09/7/22susanne Mooney is here for follow up and chemotherapy.  She has synchronous colon cancer and breast cancer: invasive moderately differentiated adenocarcinoma of the sigmoid colon, MMR proficient, s/p robotic sigmoidectomy and colostomy. 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 Clinical stage is cT3N1. Her staging CT and bone scan did not show evidence of distant mets. She has been on neoadjuvant chemotherapy. She completed dose dense AC x 4. Currently, she is on weekly Taxol. To date, she has had 11 treatments. She tolerated chemotherapy well. \par \par 10/5/22\svitlana Mooney is here for follow up. She has synchronous colon cancer and breast cancer: invasive moderately differentiated adenocarcinoma of the sigmoid colon, MMR proficient, s/p robotic sigmoidectomy and colostomy. 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 Clinical stage is cT3N1. Her staging CT and bone scan did not show evidence of distant mets. She completed neoadjuvant chemotherapy with dose dense AC x 4 and weekly Taxol x 12. She started letrozole on 9/15 and tolerating well. She had breast MRI 9/22 showed biopsy-proven malignant right breast mass with associated skin thickening. Interval reduction in size of prior noted prominent right axillary lymph node likely treatment related. No MRI evidence of malignancy in the left breast. She also had restaging CT C/A/P which showed Unchanged bilateral few pulmonary nodules measuring up to 4 mm. Interval decrease in size of previously enlarged right axillary lymph node. Indeterminate hyperenhancing 1.5 cm right hepatic lobe lesion. MRI abdomen on 9/25/22 showed Hyperenhancing right hepatic lobe lesion, demonstrating restricted diffusion, indeterminate. Metastasis is on differential. She is pending biopsy of liver lesion. \par \par 11/2/22:susanne Mooney is here for follow up. She has synchronous colon cancer and breast cancer: invasive moderately differentiated adenocarcinoma of the sigmoid colon, MMR proficient, s/p robotic sigmoidectomy and colostomy. 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 Clinical stage is cT3N1. She completed neoadjuvant chemotherapy with dose dense AC x 4 and weekly Taxol x 12. She started letrozole on 9/15 and tolerating well.  She had restaging CT C/A/P which showed Unchanged bilateral few pulmonary nodules measuring up to 4 mm. Interval decrease in size of previously enlarged right axillary lymph node. Indeterminate hyperenhancing 1.5 cm right hepatic lobe lesion. MRI abdomen on 9/25/22 showed Hyperenhancing right hepatic lobe lesion, demonstrating restricted diffusion, indeterminate. On 10/13/22, she had CT guided bx of the right liver lesion which is Negative for malignancy. The pathology showed focally distorted hepatic parenchyma. Differential diagnosis include nodular regenerative hyperplasia, focal nodular hyperplasia and hepatocellular adenoma. \par \par \par \par - Negative for malignancy.\par \par

## 2022-11-05 NOTE — ASSESSMENT
[FreeTextEntry1] : 72 yo female has synchronous colon cancer and breast cancer:\par - Invasive moderately differentiated adenocarcinoma of the sigmoid colon, MMR proficient, s/p robotic sigmoidectomy and colostomy. 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 Clinical stage is cT3N1.\par \par Assessment and Plan:\par #  Invasive well to moderately differentiated ductal carcinoma of the right breast, ER positive, AR and Her-2 negative, clinical stage cT3N1.\par -- Completed  AC x 4 and weekly Taxol x 12. \par -- Breast MRI Unchanged biopsy-proven malignant right breast mass with associated skin thickening. Interval reduction in size of prior noted prominent right axillary lymph node likely treatment related. No MRI evidence of malignancy in the left breast.\par -- Restaging CT C/A/P showed Unchanged bilateral few pulmonary nodules measuring up to 4 mm. Interval decrease in size of previously enlarged right axillary lymph node. Indeterminate hyperenhancing 1.5 cm right hepatic lobe lesion. MRI abdomen showed Hyperenhancing right hepatic lobe lesion, demonstrating restricted diffusion, indeterminate. Metastasis is on differential. On 10/13/22, CT guided bx of the right liver lesion is Negative for malignancy. The pathology showed focally distorted hepatic parenchyma. Differential diagnosis include nodular regenerative hyperplasia, focal nodular hyperplasia and hepatocellular adenoma. \par -- She will continue Letrozole.\par -- Follow up with Dr. Vance to discuss breast surgery. \par -- Bone density was normal. Advised to continue calcium and vitamin D and  regular exercise. \par \par # IIA ([id number]) adenocarcinoma of the sigmoid colon, MMR proficient, s/p robotic sigmoidectomy. \par -- She has a couple of high risk factors including bowel obstruction and positive lymphovascular invasion but 22 lymph node were negative. Benefit adjuvant adjuvant chemotherapy was likely to be small. In addition, she needed to move onto her breast cancer treatment. \par -- Followup with GI for surveillance colonoscopy.\par \par RTC 4 weeks.\par \par

## 2022-11-07 ENCOUNTER — NON-APPOINTMENT (OUTPATIENT)
Age: 72
End: 2022-11-07

## 2022-11-11 ENCOUNTER — OUTPATIENT (OUTPATIENT)
Dept: OUTPATIENT SERVICES | Facility: HOSPITAL | Age: 72
LOS: 1 days | Discharge: HOME | End: 2022-11-11

## 2022-11-11 VITALS
DIASTOLIC BLOOD PRESSURE: 72 MMHG | HEIGHT: 65 IN | OXYGEN SATURATION: 100 % | SYSTOLIC BLOOD PRESSURE: 126 MMHG | WEIGHT: 184.75 LBS | HEART RATE: 100 BPM | TEMPERATURE: 98 F | RESPIRATION RATE: 18 BRPM

## 2022-11-11 DIAGNOSIS — C50.211 MALIGNANT NEOPLASM OF UPPER-INNER QUADRANT OF RIGHT FEMALE BREAST: ICD-10-CM

## 2022-11-11 DIAGNOSIS — Z90.49 ACQUIRED ABSENCE OF OTHER SPECIFIED PARTS OF DIGESTIVE TRACT: Chronic | ICD-10-CM

## 2022-11-11 DIAGNOSIS — N64.89 OTHER SPECIFIED DISORDERS OF BREAST: ICD-10-CM

## 2022-11-11 DIAGNOSIS — Z01.818 ENCOUNTER FOR OTHER PREPROCEDURAL EXAMINATION: ICD-10-CM

## 2022-11-11 LAB
ALBUMIN SERPL ELPH-MCNC: 4.5 G/DL — SIGNIFICANT CHANGE UP (ref 3.5–5.2)
ALP SERPL-CCNC: 76 U/L — SIGNIFICANT CHANGE UP (ref 30–115)
ALT FLD-CCNC: 11 U/L — SIGNIFICANT CHANGE UP (ref 0–41)
ANION GAP SERPL CALC-SCNC: 16 MMOL/L — HIGH (ref 7–14)
APTT BLD: 27.7 SEC — SIGNIFICANT CHANGE UP (ref 27–39.2)
AST SERPL-CCNC: 18 U/L — SIGNIFICANT CHANGE UP (ref 0–41)
BASOPHILS # BLD AUTO: 0.02 K/UL — SIGNIFICANT CHANGE UP (ref 0–0.2)
BASOPHILS NFR BLD AUTO: 0.5 % — SIGNIFICANT CHANGE UP (ref 0–1)
BILIRUB SERPL-MCNC: 0.6 MG/DL — SIGNIFICANT CHANGE UP (ref 0.2–1.2)
BUN SERPL-MCNC: 17 MG/DL — SIGNIFICANT CHANGE UP (ref 10–20)
CALCIUM SERPL-MCNC: 9.5 MG/DL — SIGNIFICANT CHANGE UP (ref 8.4–10.5)
CHLORIDE SERPL-SCNC: 97 MMOL/L — LOW (ref 98–110)
CO2 SERPL-SCNC: 26 MMOL/L — SIGNIFICANT CHANGE UP (ref 17–32)
CREAT SERPL-MCNC: 0.9 MG/DL — SIGNIFICANT CHANGE UP (ref 0.7–1.5)
EGFR: 68 ML/MIN/1.73M2 — SIGNIFICANT CHANGE UP
EOSINOPHIL # BLD AUTO: 0.12 K/UL — SIGNIFICANT CHANGE UP (ref 0–0.7)
EOSINOPHIL NFR BLD AUTO: 3.1 % — SIGNIFICANT CHANGE UP (ref 0–8)
GLUCOSE SERPL-MCNC: 168 MG/DL — HIGH (ref 70–99)
HCT VFR BLD CALC: 43.7 % — SIGNIFICANT CHANGE UP (ref 37–47)
HGB BLD-MCNC: 14.9 G/DL — SIGNIFICANT CHANGE UP (ref 12–16)
IMM GRANULOCYTES NFR BLD AUTO: 0.3 % — SIGNIFICANT CHANGE UP (ref 0.1–0.3)
INR BLD: 1.06 RATIO — SIGNIFICANT CHANGE UP (ref 0.65–1.3)
LYMPHOCYTES # BLD AUTO: 0.52 K/UL — LOW (ref 1.2–3.4)
LYMPHOCYTES # BLD AUTO: 13.2 % — LOW (ref 20.5–51.1)
MCHC RBC-ENTMCNC: 29.6 PG — SIGNIFICANT CHANGE UP (ref 27–31)
MCHC RBC-ENTMCNC: 34.1 G/DL — SIGNIFICANT CHANGE UP (ref 32–37)
MCV RBC AUTO: 86.7 FL — SIGNIFICANT CHANGE UP (ref 81–99)
MONOCYTES # BLD AUTO: 0.38 K/UL — SIGNIFICANT CHANGE UP (ref 0.1–0.6)
MONOCYTES NFR BLD AUTO: 9.7 % — HIGH (ref 1.7–9.3)
NEUTROPHILS # BLD AUTO: 2.88 K/UL — SIGNIFICANT CHANGE UP (ref 1.4–6.5)
NEUTROPHILS NFR BLD AUTO: 73.2 % — SIGNIFICANT CHANGE UP (ref 42.2–75.2)
NRBC # BLD: 0 /100 WBCS — SIGNIFICANT CHANGE UP (ref 0–0)
PLATELET # BLD AUTO: 199 K/UL — SIGNIFICANT CHANGE UP (ref 130–400)
POTASSIUM SERPL-MCNC: 3.1 MMOL/L — LOW (ref 3.5–5)
POTASSIUM SERPL-SCNC: 3.1 MMOL/L — LOW (ref 3.5–5)
PROT SERPL-MCNC: 6.7 G/DL — SIGNIFICANT CHANGE UP (ref 6–8)
PROTHROM AB SERPL-ACNC: 12.1 SEC — SIGNIFICANT CHANGE UP (ref 9.95–12.87)
RBC # BLD: 5.04 M/UL — SIGNIFICANT CHANGE UP (ref 4.2–5.4)
RBC # FLD: 13.2 % — SIGNIFICANT CHANGE UP (ref 11.5–14.5)
SODIUM SERPL-SCNC: 139 MMOL/L — SIGNIFICANT CHANGE UP (ref 135–146)
WBC # BLD: 3.93 K/UL — LOW (ref 4.8–10.8)
WBC # FLD AUTO: 3.93 K/UL — LOW (ref 4.8–10.8)

## 2022-11-11 PROCEDURE — 93010 ELECTROCARDIOGRAM REPORT: CPT

## 2022-11-11 NOTE — H&P PST ADULT - HISTORY OF PRESENT ILLNESS
Patient is a 72 year old female presenting to PAST in preparation for  RIGHT MODIFIED RADICAL MASTECTOMY, EXCISION LEFT BREAST MASS CORE BIOPSY WITH RADIO FREQUENCY LOCALIZER on 11/22 under gen  anesthesia by Dr. Vance  Reports h/o colon cancer and breast ca dx 11/21. Had colon resection with colostomy and then reversal . Has received chemo tx for the breast cancer and now is scheduled for above  PATIENT CURRENTLY DENIES CHEST PAIN  SHORTNESS OF BREATH  PALPITATIONS,  DYSURIA, OR UPPER RESPIRATORY INFECTION IN PAST 2 WEEKS  EXERCISE  TOLERANCE  1-2 FLIGHT OF STAIRS  WITHOUT SHORTNESS OF BREATH    Anesthesia Alert  NO--Difficult Airway  NO--History of neck surgery or radiation  NO--Limited ROM of neck  NO--History of Malignant hyperthermia  NO--Personal or family history of Pseudocholinesterase deficiency  NO--Prior Anesthesia Complication  NO--Latex Allergy  NO--Loose teeth  NO--History of Rheumatoid Arthritis  NO--DON  NO-- BLEEDING RISK  NO--Other_____    As per patient, this is their complete medical and surgical history, including medications both prescribed or over the counter.  Patient verbalized understanding of instructions and was given the opportunity to ask questions and have them answered.  Patient denies any signs or symptoms of COVID 19 and denies contact with known positive individuals.  They have an appointment for  COVID testing pre-procedure and acknowledge its time and place.  They were instructed to quarantine pre-procedure, practice exposure control measures, continue to self-monitor and report any concerns to their proceduralist.

## 2022-11-14 ENCOUNTER — OUTPATIENT (OUTPATIENT)
Dept: OUTPATIENT SERVICES | Facility: HOSPITAL | Age: 72
LOS: 1 days | Discharge: HOME | End: 2022-11-14

## 2022-11-14 DIAGNOSIS — Z90.49 ACQUIRED ABSENCE OF OTHER SPECIFIED PARTS OF DIGESTIVE TRACT: Chronic | ICD-10-CM

## 2022-11-14 DIAGNOSIS — Z02.9 ENCOUNTER FOR ADMINISTRATIVE EXAMINATIONS, UNSPECIFIED: ICD-10-CM

## 2022-11-14 LAB
POTASSIUM SERPL-MCNC: 3.8 MMOL/L — SIGNIFICANT CHANGE UP (ref 3.5–5)
POTASSIUM SERPL-SCNC: 3.8 MMOL/L — SIGNIFICANT CHANGE UP (ref 3.5–5)

## 2022-11-17 ENCOUNTER — RESULT REVIEW (OUTPATIENT)
Age: 72
End: 2022-11-17

## 2022-11-17 ENCOUNTER — OUTPATIENT (OUTPATIENT)
Dept: OUTPATIENT SERVICES | Facility: HOSPITAL | Age: 72
LOS: 1 days | Discharge: HOME | End: 2022-11-17

## 2022-11-17 ENCOUNTER — APPOINTMENT (OUTPATIENT)
Dept: CARDIOLOGY | Facility: CLINIC | Age: 72
End: 2022-11-17

## 2022-11-17 VITALS — DIASTOLIC BLOOD PRESSURE: 80 MMHG | RESPIRATION RATE: 18 BRPM | SYSTOLIC BLOOD PRESSURE: 130 MMHG

## 2022-11-17 VITALS — WEIGHT: 184 LBS | HEART RATE: 94 BPM | BODY MASS INDEX: 30.66 KG/M2 | HEIGHT: 65 IN

## 2022-11-17 DIAGNOSIS — N64.89 OTHER SPECIFIED DISORDERS OF BREAST: ICD-10-CM

## 2022-11-17 DIAGNOSIS — Z01.810 ENCOUNTER FOR PREPROCEDURAL CARDIOVASCULAR EXAMINATION: ICD-10-CM

## 2022-11-17 DIAGNOSIS — Z90.49 ACQUIRED ABSENCE OF OTHER SPECIFIED PARTS OF DIGESTIVE TRACT: Chronic | ICD-10-CM

## 2022-11-17 PROCEDURE — 19281 PERQ DEVICE BREAST 1ST IMAG: CPT | Mod: LT

## 2022-11-17 PROCEDURE — 93000 ELECTROCARDIOGRAM COMPLETE: CPT | Mod: NC

## 2022-11-17 PROCEDURE — 19282 PERQ DEVICE BREAST EA IMAG: CPT | Mod: LT

## 2022-11-17 PROCEDURE — 99214 OFFICE O/P EST MOD 30 MIN: CPT

## 2022-11-19 ENCOUNTER — LABORATORY RESULT (OUTPATIENT)
Age: 72
End: 2022-11-19

## 2022-11-30 ENCOUNTER — APPOINTMENT (OUTPATIENT)
Dept: HEMATOLOGY ONCOLOGY | Facility: CLINIC | Age: 72
End: 2022-11-30

## 2022-11-30 ENCOUNTER — APPOINTMENT (OUTPATIENT)
Dept: INFUSION THERAPY | Facility: CLINIC | Age: 72
End: 2022-11-30

## 2022-11-30 VITALS
HEART RATE: 74 BPM | WEIGHT: 185 LBS | OXYGEN SATURATION: 98 % | SYSTOLIC BLOOD PRESSURE: 102 MMHG | DIASTOLIC BLOOD PRESSURE: 73 MMHG | HEIGHT: 65 IN | RESPIRATION RATE: 19 BRPM | BODY MASS INDEX: 30.82 KG/M2

## 2022-11-30 PROCEDURE — 99213 OFFICE O/P EST LOW 20 MIN: CPT

## 2022-12-02 NOTE — PHYSICAL EXAM
[Fully active, able to carry on all pre-disease performance without restriction] : Status 0 - Fully active, able to carry on all pre-disease performance without restriction [Normal] : affect appropriate [de-identified] : Persistent large right breast mass and size reduced. No palpable right axillary lymph node.

## 2022-12-02 NOTE — ASSESSMENT
[FreeTextEntry1] : 72 yo female has synchronous colon cancer and breast cancer:\par - Invasive moderately differentiated adenocarcinoma of the sigmoid colon, MMR proficient, s/p robotic sigmoidectomy and colostomy. 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 Clinical stage is cT3N1.\par \par Assessment and Plan:\par #  Invasive well to moderately differentiated ductal carcinoma of the right breast, ER positive, WA and Her-2 negative, clinical stage cT3N1.\par -- Completed  AC x 4 and weekly Taxol x 12. \par -- Breast MRI Unchanged biopsy-proven malignant right breast mass with associated skin thickening. Interval reduction in size of prior noted prominent right axillary lymph node likely treatment related. No MRI evidence of malignancy in the left breast.\par -- Restaging CT C/A/P showed Unchanged bilateral few pulmonary nodules measuring up to 4 mm. Interval decrease in size of previously enlarged right axillary lymph node. Indeterminate hyperenhancing 1.5 cm right hepatic lobe lesion. MRI abdomen showed Hyperenhancing right hepatic lobe lesion, demonstrating restricted diffusion, indeterminate. Metastasis is on differential. On 10/13/22, CT guided bx of the right liver lesion is Negative for malignancy. The pathology showed focally distorted hepatic parenchyma. Differential diagnosis include nodular regenerative hyperplasia, focal nodular hyperplasia and hepatocellular adenoma. \par -- She will continue Letrozole.\par -- Follow up with Dr. Vance for breast surgery scheduled on 12/6/22.\par -- Bone density was normal. Advised to continue calcium and vitamin D and  regular exercise. \par \par # IIA ([id number]) adenocarcinoma of the sigmoid colon, MMR proficient, s/p robotic sigmoidectomy. \par -- She has a couple of high risk factors including bowel obstruction and positive lymphovascular invasion but 22 lymph node were negative. Benefit adjuvant adjuvant chemotherapy was likely to be small. In addition, she needed to move onto her breast cancer treatment. \par -- Followup with GI for surveillance colonoscopy.\par \par RTC 4 weeks with Dr. Daniel.\par \par

## 2022-12-02 NOTE — HISTORY OF PRESENT ILLNESS
[de-identified] : 72 yo female was referred by Dr. Quinonez for consultation of breast cancer. Jesica initially presented to hospital for constipation and abdominal pain. CT a/p on 11/24/21 showed  large bowel obstruction secondary to a likely malignant stricture in the proximal sigmoid colon, small volume ascites and lobulated, partially visualized right breast mass and left breast nodule. Physical exam correlation and correlation with dedicated breast imaging was recommended as malignancy is of concern.\par \par On 11/26/21, CT chest showed right and left breast masses. The right breast mass measured 6.5 x 3.8 x 7.0 cm and contained small calcifications. The left breast mass measured 3.1 x 2.9 x 2.6 cm.\par \par On 11/29/21, colonoscopy revealed sigmoid colon mass and and biopsy showed invasive adenocarcinoma, moderately differentiated. MMR proteins were all expressed. 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 \par \par On 12/21/21, b/l breast dx mammo and US were performed which showed 10 cm mass at the 11 to 1:00 position 3 to 4 cm from the nipple corresponding to the biopsy-proven right breast carcinoma.  In the right axilla, at the 11:00 position 13 cm from the nipple, there is an abnormal lymph node measuring 2.3 x 1.3 x 0.9 cm. Ultrasound-guided biopsy is recommended. In the left breast, there are scattered similar appearing subcentimeter circumscribed masses seen throughout the left breast. These correspond to mammographically demonstrated masses:\par At the 3:00 position 6 cm from the nipple, there is a mass measuring 0.4 x 0.3 x 0.3 cm. Ultrasound-guided biopsy of this mass is recommended.\par At the 7 to 8:00 position 5 cm from the nipple, there is a mass measuring 0.4 x 0.3 x 0.2 cm.\par At the 8 to 9:00 position 4 cm from the nipple, there is a circumscribed mass measuring 0.4 x 0.4 x 0.3 cm.\par At the 8 to 9:00 position 3 cm from nipple, corresponding to area palpable concern and mammographic findings, there is a heterogeneous mass measuring 2.5 x 2.3 x 2.0 cm. Ultrasound-guided biopsy is recommended.\par \par On 12/30/21, left breast 3 N6 mass, ultrasound guided needle core biopsies revealed radial sclerosing lesion (radial scar) and Benign atrophic breast tissue with proliferative type fibrocystic changes. Left breast 8-9 N3 mass, ultrasound guided needle core biopsies showed single minute fragment of benign atrophic breast tissue.  \par The right axillary mass, ultrasound guided needle core biopsies were positive for moderately differentiated adenocarcinoma, most likely representative of metastasis from the patient's known mammary primary carcinoma to an axillary lymph node with mervat replacement, obliteration, and extensive extracapsular extension.\par \par On 1/19/22, left breast relatively posterior calcifications, stereotactic guided vacuum assisted needle core biopsies showed radial sclerosing lesion (radial scar) and benign atrophic breast tissue with proliferative type fibrocystic changes. Left relatively anterior calcifications, stereotactic guided vacuum assisted needle core biopsies showed benign atrophic breast tissue with proliferative type fibrocystic changes.  \par \par On 1/5/22, she had b/l breast MRI which showed 8 cm biopsy-proven right breast index carcinoma and axillary mervat metastasis. Biopsy-proven left breast radial scar with no additional sites of suspicious enhancement.\par \par On 2/17/22, she underwent robotic sigmoidectomy and colostomy. The pathology revealed Invasive moderately differentiated adenocarcinoma (6 cm in greatest dimension), invading through muscularis propria into pericolorectal tissue (pT3), +LVI.  All margins were negative for invasive carcinoma.  Twenty two lymph nodes were negative for metastatic carcinoma. She recovered well from surgery. \par \par She is here today with her  to discuss breast cancer treatment. She has no family h/o breast cancer or any other kinds of cancer.  [de-identified] : 4/21/22:susanne Mooney is here today for followup and chemotherapy. She has synchronous colon cancer and breast cancer: invasive moderately differentiated adenocarcinoma of the sigmoid colon, MMR proficient, s/p robotic sigmoidectomy and colostomy. 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 Clinical stage is cT3N1. Her staging CT and bone scan did not show evidence of distant mets. She is here today to start neoadjuvant chemotherapy for breast cancer. \par \par 5/9/22:susanne Mooney is here today for followup and chemotherapy. She has synchronous colon cancer and breast cancer: invasive moderately differentiated adenocarcinoma of the sigmoid colon, MMR proficient, s/p robotic sigmoidectomy and colostomy. 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 Clinical stage is cT3N1. Her staging CT and bone scan did not show evidence of distant mets. She started on neoadjuvant chemotherapy for breast cancer. She had 1st cycle AC 2 weeks ago and tolerated well. \par \par 5/23/22susanne Mooney is here today for followup and chemotherapy. She has synchronous colon cancer and breast cancer: invasive moderately differentiated adenocarcinoma of the sigmoid colon, MMR proficient, s/p robotic sigmoidectomy and colostomy. 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 Clinical stage is cT3N1. Her staging CT and bone scan did not show evidence of distant mets. She started on neoadjuvant chemotherapy. To date, she has had 2 cycles and tolerated well. She did not have fever, mouth sore, vomiting or diarrhea. \par \par 6/6/22susanne Mooney is here today for followup and chemotherapy. She has synchronous colon cancer and breast cancer: invasive moderately differentiated adenocarcinoma of the sigmoid colon, MMR proficient, s/p robotic sigmoidectomy and colostomy. 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 Clinical stage is cT3N1. Her staging CT and bone scan did not show evidence of distant mets. She has been on neoadjuvant chemotherapy with dose dense AC. To date, she has had 3 cycles and tolerated well. She did not have fever, mouth sore, vomiting or diarrhea. She does endorse some burning/tearing sensation in the eye. \par \par 6/20/22:susanne Mooney is here today for followup and chemotherapy. She has synchronous colon cancer and breast cancer: invasive moderately differentiated adenocarcinoma of the sigmoid colon, MMR proficient, s/p robotic sigmoidectomy and colostomy. 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 Clinical stage is cT3N1. Her staging CT and bone scan did not show evidence of distant mets. She has been on neoadjuvant chemotherapy. She completed dose dense AC x 4. She feels more fatigue. Her eye burning is getting better.\par \par 7/11/22susanne Mooney is here for follow up and chemotherapy.  She has synchronous colon cancer and breast cancer: invasive moderately differentiated adenocarcinoma of the sigmoid colon, MMR proficient, s/p robotic sigmoidectomy and colostomy. 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 Clinical stage is cT3N1. Her staging CT and bone scan did not show evidence of distant mets. She has been on neoadjuvant chemotherapy. She completed dose dense AC x 4. She completed 3 doses of weekly Taxol so far. She feels well. Her energy level is better and burning of the eyes is resolved. She followed up with Colorectal [facility] and they want to wait until Taxol is finished for her chemotherapy.\par \par 09/7/22susanne Mooney is here for follow up and chemotherapy.  She has synchronous colon cancer and breast cancer: invasive moderately differentiated adenocarcinoma of the sigmoid colon, MMR proficient, s/p robotic sigmoidectomy and colostomy. 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 Clinical stage is cT3N1. Her staging CT and bone scan did not show evidence of distant mets. She has been on neoadjuvant chemotherapy. She completed dose dense AC x 4. Currently, she is on weekly Taxol. To date, she has had 11 treatments. She tolerated chemotherapy well. \par \par 10/5/22\svitlana Mooney is here for follow up. She has synchronous colon cancer and breast cancer: invasive moderately differentiated adenocarcinoma of the sigmoid colon, MMR proficient, s/p robotic sigmoidectomy and colostomy. 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 Clinical stage is cT3N1. Her staging CT and bone scan did not show evidence of distant mets. She completed neoadjuvant chemotherapy with dose dense AC x 4 and weekly Taxol x 12. She started letrozole on 9/15 and tolerating well. She had breast MRI 9/22 showed biopsy-proven malignant right breast mass with associated skin thickening. Interval reduction in size of prior noted prominent right axillary lymph node likely treatment related. No MRI evidence of malignancy in the left breast. She also had restaging CT C/A/P which showed Unchanged bilateral few pulmonary nodules measuring up to 4 mm. Interval decrease in size of previously enlarged right axillary lymph node. Indeterminate hyperenhancing 1.5 cm right hepatic lobe lesion. MRI abdomen on 9/25/22 showed Hyperenhancing right hepatic lobe lesion, demonstrating restricted diffusion, indeterminate. Metastasis is on differential. She is pending biopsy of liver lesion. \par \par 11/2/22:susanne Mooney is here for follow up. She has synchronous colon cancer and breast cancer: invasive moderately differentiated adenocarcinoma of the sigmoid colon, MMR proficient, s/p robotic sigmoidectomy and colostomy. 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 Clinical stage is cT3N1. She completed neoadjuvant chemotherapy with dose dense AC x 4 and weekly Taxol x 12. She started letrozole on 9/15 and tolerating well.  She had restaging CT C/A/P which showed Unchanged bilateral few pulmonary nodules measuring up to 4 mm. Interval decrease in size of previously enlarged right axillary lymph node. Indeterminate hyperenhancing 1.5 cm right hepatic lobe lesion. MRI abdomen on 9/25/22 showed Hyperenhancing right hepatic lobe lesion, demonstrating restricted diffusion, indeterminate. On 10/13/22, she had CT guided bx of the right liver lesion which is Negative for malignancy. The pathology showed focally distorted hepatic parenchyma. Differential diagnosis include nodular regenerative hyperplasia, focal nodular hyperplasia and hepatocellular adenoma. \par - Negative for malignancy.\par \par 11/30/22\par Jesica is here for follow up. She has synchronous colon cancer and breast cancer: invasive moderately differentiated adenocarcinoma of the sigmoid colon, MMR proficient, s/p robotic sigmoidectomy and colostomy. 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 Clinical stage is cT3N1. She completed neoadjuvant chemotherapy with dose dense AC x 4 and weekly Taxol x 12. She started letrozole on 9/15 and tolerating well.  She had restaging CT C/A/P which showed Unchanged bilateral few pulmonary nodules measuring up to 4 mm. Interval decrease in size of previously enlarged right axillary lymph node. Indeterminate hyperenhancing 1.5 cm right hepatic lobe lesion. MRI abdomen on 9/25/22 showed Hyperenhancing right hepatic lobe lesion, demonstrating restricted diffusion, indeterminate. On 10/13/22, she had CT guided bx of the right liver lesion which is Negative for malignancy. The pathology showed focally distorted hepatic parenchyma. Differential diagnosis include nodular regenerative hyperplasia, focal nodular hyperplasia and hepatocellular adenoma. - Negative for malignancy.\par Pt was suppose to have her right breast mastectomy and left breast mass excision but was postponed till 12/6/22 due to being COVID+ upon PAST screening.  Pt was asymptomatic.  She offers no complaints and here for port flush as well.\par \par

## 2022-12-05 ENCOUNTER — APPOINTMENT (OUTPATIENT)
Dept: SURGERY | Facility: CLINIC | Age: 72
End: 2022-12-05

## 2022-12-05 NOTE — ASU PATIENT PROFILE, ADULT - VISION (WITH CORRECTIVE LENSES IF THE PATIENT USUALLY WEARS THEM):
Is Xerosis Present?: Yes - Normal Treatment Normal vision: sees adequately in most situations; can see medication labels, newsprint

## 2022-12-05 NOTE — ASU PATIENT PROFILE, ADULT - FALL HARM RISK - UNIVERSAL INTERVENTIONS
Bed in lowest position, wheels locked, appropriate side rails in place/Call bell, personal items and telephone in reach/Instruct patient to call for assistance before getting out of bed or chair/Non-slip footwear when patient is out of bed/[place] to call system/Physically safe environment - no spills, clutter or unnecessary equipment/Purposeful Proactive Rounding/Room/bathroom lighting operational, light cord in reach

## 2022-12-06 ENCOUNTER — TRANSCRIPTION ENCOUNTER (OUTPATIENT)
Age: 72
End: 2022-12-06

## 2022-12-06 ENCOUNTER — RESULT REVIEW (OUTPATIENT)
Age: 72
End: 2022-12-06

## 2022-12-06 ENCOUNTER — APPOINTMENT (OUTPATIENT)
Dept: SURGERY | Facility: HOSPITAL | Age: 72
End: 2022-12-06

## 2022-12-06 ENCOUNTER — INPATIENT (INPATIENT)
Facility: HOSPITAL | Age: 72
LOS: 0 days | Discharge: HOME HEALTH | End: 2022-12-07
Attending: SURGERY | Admitting: SURGERY

## 2022-12-06 ENCOUNTER — OUTPATIENT (OUTPATIENT)
Dept: ADMINISTRATIVE | Facility: HOSPITAL | Age: 72
LOS: 1 days | Discharge: HOME | End: 2022-12-06

## 2022-12-06 VITALS
SYSTOLIC BLOOD PRESSURE: 144 MMHG | RESPIRATION RATE: 16 BRPM | OXYGEN SATURATION: 98 % | HEIGHT: 65 IN | TEMPERATURE: 98 F | WEIGHT: 184.09 LBS | DIASTOLIC BLOOD PRESSURE: 94 MMHG | HEART RATE: 97 BPM

## 2022-12-06 DIAGNOSIS — C50.911 MALIGNANT NEOPLASM OF UNSPECIFIED SITE OF RIGHT FEMALE BREAST: ICD-10-CM

## 2022-12-06 DIAGNOSIS — Z85.038 PERSONAL HISTORY OF OTHER MALIGNANT NEOPLASM OF LARGE INTESTINE: ICD-10-CM

## 2022-12-06 DIAGNOSIS — C77.3 SECONDARY AND UNSPECIFIED MALIGNANT NEOPLASM OF AXILLA AND UPPER LIMB LYMPH NODES: ICD-10-CM

## 2022-12-06 DIAGNOSIS — Z90.49 ACQUIRED ABSENCE OF OTHER SPECIFIED PARTS OF DIGESTIVE TRACT: Chronic | ICD-10-CM

## 2022-12-06 DIAGNOSIS — I10 ESSENTIAL (PRIMARY) HYPERTENSION: ICD-10-CM

## 2022-12-06 LAB — BLD GP AB SCN SERPL QL: SIGNIFICANT CHANGE UP

## 2022-12-06 PROCEDURE — 88360 TUMOR IMMUNOHISTOCHEM/MANUAL: CPT | Mod: 26

## 2022-12-06 PROCEDURE — 88342 IMHCHEM/IMCYTCHM 1ST ANTB: CPT | Mod: 26,59

## 2022-12-06 PROCEDURE — 88307 TISSUE EXAM BY PATHOLOGIST: CPT | Mod: 26

## 2022-12-06 PROCEDURE — 88341 IMHCHEM/IMCYTCHM EA ADD ANTB: CPT | Mod: 26,59

## 2022-12-06 PROCEDURE — 19126 EXCISION ADDL BREAST LESION: CPT

## 2022-12-06 PROCEDURE — 12036 INTMD RPR S/A/T/EXT 20.1-30: CPT | Mod: 59

## 2022-12-06 PROCEDURE — 88305 TISSUE EXAM BY PATHOLOGIST: CPT | Mod: 26

## 2022-12-06 PROCEDURE — 19125 EXCISION BREAST LESION: CPT | Mod: 59

## 2022-12-06 PROCEDURE — 19307 MAST MOD RAD: CPT | Mod: RT

## 2022-12-06 RX ORDER — ONDANSETRON 8 MG/1
4 TABLET, FILM COATED ORAL ONCE
Refills: 0 | Status: COMPLETED | OUTPATIENT
Start: 2022-12-06 | End: 2022-12-06

## 2022-12-06 RX ORDER — METOPROLOL TARTRATE 50 MG
50 TABLET ORAL DAILY
Refills: 0 | Status: DISCONTINUED | OUTPATIENT
Start: 2022-12-06 | End: 2022-12-07

## 2022-12-06 RX ORDER — KETOROLAC TROMETHAMINE 30 MG/ML
15 SYRINGE (ML) INJECTION EVERY 8 HOURS
Refills: 0 | Status: DISCONTINUED | OUTPATIENT
Start: 2022-12-06 | End: 2022-12-07

## 2022-12-06 RX ORDER — PROCHLORPERAZINE MALEATE 5 MG
10 TABLET ORAL ONCE
Refills: 0 | Status: COMPLETED | OUTPATIENT
Start: 2022-12-06 | End: 2022-12-06

## 2022-12-06 RX ORDER — LETROZOLE 2.5 MG/1
2.5 TABLET, FILM COATED ORAL DAILY
Refills: 0 | Status: DISCONTINUED | OUTPATIENT
Start: 2022-12-06 | End: 2022-12-07

## 2022-12-06 RX ORDER — AMLODIPINE BESYLATE 2.5 MG/1
5 TABLET ORAL DAILY
Refills: 0 | Status: DISCONTINUED | OUTPATIENT
Start: 2022-12-06 | End: 2022-12-07

## 2022-12-06 RX ORDER — ACETAMINOPHEN 500 MG
650 TABLET ORAL EVERY 6 HOURS
Refills: 0 | Status: DISCONTINUED | OUTPATIENT
Start: 2022-12-06 | End: 2022-12-07

## 2022-12-06 RX ORDER — HYDROMORPHONE HYDROCHLORIDE 2 MG/ML
0.5 INJECTION INTRAMUSCULAR; INTRAVENOUS; SUBCUTANEOUS
Refills: 0 | Status: DISCONTINUED | OUTPATIENT
Start: 2022-12-06 | End: 2022-12-07

## 2022-12-06 RX ORDER — CHLORHEXIDINE GLUCONATE 213 G/1000ML
1 SOLUTION TOPICAL
Refills: 0 | Status: DISCONTINUED | OUTPATIENT
Start: 2022-12-06 | End: 2022-12-07

## 2022-12-06 RX ORDER — OXYCODONE HYDROCHLORIDE 5 MG/1
5 TABLET ORAL EVERY 6 HOURS
Refills: 0 | Status: DISCONTINUED | OUTPATIENT
Start: 2022-12-06 | End: 2022-12-07

## 2022-12-06 RX ADMIN — Medication 10 MILLIGRAM(S): at 20:09

## 2022-12-06 RX ADMIN — ONDANSETRON 4 MILLIGRAM(S): 8 TABLET, FILM COATED ORAL at 19:06

## 2022-12-06 NOTE — BRIEF OPERATIVE NOTE - NSICDXBRIEFPOSTOP_GEN_ALL_CORE_FT
POST-OP DIAGNOSIS:  Radial scar of left breast 06-Dec-2022 18:45:13  Navya De La O  Invasive ductal carcinoma of right breast 06-Dec-2022 18:45:08 with postive lymph node with extracapsular invasion Navya De La O

## 2022-12-06 NOTE — PRE-ANESTHESIA EVALUATION ADULT - NSANTHPMHFT_GEN_ALL_CORE
Cardiology clearance  PATIENT IS AN ACCEPTABLE CARDIAC RISK FOR THE PLANNED PROCEDURE BASED ON NORMAL LV FUNCTION ON ECHO AND ABILITY TO WALK > 4 METS WITHOUT CHEST PAIN OR SHORTNESS OF BREATH      DIET DISCUSSED IN DETAIL  CONTINUE AMLODIPINE 7.5mg \CONTINUE CHLORTHALIDONE 25mg M\W\F FOR HYPERTENSION  CONTINUE TOPROL XL 50mg DAILY FOR TACHYCARDIA  ADEQUATE HYDRATION DISCUSSED  COLORECTAL SURGERY FOLLOWUP  BREAST SURGEON FOLLOWUP  ONCOLOGY FOLLOWUP   FOLLOWUP WITH PMD  FOLLOWUP IN 6 MONTHS

## 2022-12-06 NOTE — BRIEF OPERATIVE NOTE - NSICDXBRIEFPREOP_GEN_ALL_CORE_FT
PRE-OP DIAGNOSIS:  Invasive ductal carcinoma of right breast 06-Dec-2022 18:44:39 with postive lymph node with extracapsular invasion Navya De La O  Radial scar of left breast 06-Dec-2022 18:44:26  Navya De La O

## 2022-12-06 NOTE — PRE-ANESTHESIA EVALUATION ADULT - NSRADCARDRESULTSFT_GEN_ALL_CORE
1. Left ventricular ejection fraction, by visual estimation, is 55 to 60%.   2. Spectral Doppler shows impaired relaxation pattern of left ventricular myocardial filling (Grade I diastolic dysfunction).   3. Mildly enlarged left atrium.   4. Normal right atrial size.   5. Trace mitral valve regurgitation.   6. Mild tricuspid regurgitation.

## 2022-12-06 NOTE — BRIEF OPERATIVE NOTE - NSICDXBRIEFPROCEDURE_GEN_ALL_CORE_FT
PROCEDURES:  Right modified radical mastectomy 06-Dec-2022 18:43:35  Navya De La O  Excisional biopsy of left breast 06-Dec-2022 18:43:54  Navya De La O

## 2022-12-06 NOTE — BRIEF OPERATIVE NOTE - OPERATION/FINDINGS
Left lumpectomy for radial scar found on mammography. Prior to OR two radiofrequency tags were placed by radiology (#s [id number], [id number]). Both areas identified at the lateral breast via radiotracer. Confirmed tags in both specimens.     Right modified radical mastectomy performed. Hemostasis achieved. Two ALONZO drains placed, one along the pectoralis major and the other in the axilla.

## 2022-12-07 ENCOUNTER — TRANSCRIPTION ENCOUNTER (OUTPATIENT)
Age: 72
End: 2022-12-07

## 2022-12-07 VITALS
TEMPERATURE: 98 F | OXYGEN SATURATION: 96 % | DIASTOLIC BLOOD PRESSURE: 85 MMHG | RESPIRATION RATE: 14 BRPM | SYSTOLIC BLOOD PRESSURE: 159 MMHG | HEART RATE: 91 BPM

## 2022-12-07 RX ORDER — OXYCODONE AND ACETAMINOPHEN 5; 325 MG/1; MG/1
1 TABLET ORAL
Qty: 12 | Refills: 0
Start: 2022-12-07

## 2022-12-07 RX ADMIN — AMLODIPINE BESYLATE 5 MILLIGRAM(S): 2.5 TABLET ORAL at 05:56

## 2022-12-07 RX ADMIN — Medication 50 MILLIGRAM(S): at 05:55

## 2022-12-07 RX ADMIN — LETROZOLE 2.5 MILLIGRAM(S): 2.5 TABLET, FILM COATED ORAL at 12:17

## 2022-12-07 NOTE — PROGRESS NOTE ADULT - SUBJECTIVE AND OBJECTIVE BOX
GENERAL SURGERY PROGRESS NOTE    Patient: ABHINAV BOYKIN , 72y ([birth year])Female   MRN: [id number]  Location: [facility]  Visit: 12-06-22 Outpatient  Date: 12-07-22 @ 12:15    Hospital Day #:2  Post-Op Day #:1    Procedure/Dx/Injuries:    Events of past 24 hours:  NAEON  Patient seen and examined at bedside  Patient reports mild nausea but no vomiting  Patient has yet to be OOB   Pateint is voiding independently and adequately     PAST MEDICAL & SURGICAL HISTORY:  HTN (hypertension)      Breast cancer  recently dx 11/24/ 2021 no tx      Cancer, colon  dx 11/24/2021 s/p chemo last tx 9/7/22      Obese      History of colon resection  2/17/2022  Resection trans colon  resection of sigmoid colon (  colostomy reversed 2/17/2022)          Vitals:   T(F): 96.8 (12-06-22 @ 20:00), Max: 98.4 (12-06-22 @ 13:07)  HR: 68 (12-07-22 @ 12:00)  BP: 158/88 (12-07-22 @ 12:00)  RR: 18 (12-07-22 @ 12:00)  SpO2: 96% (12-07-22 @ 12:00)      Diet, Regular      Fluids:     I & O's:    12-06-22 @ 07:01  -  12-07-22 @ 07:00  --------------------------------------------------------  IN:  Total IN: 0 mL    OUT:    Bulb (mL): 70 mL    Bulb (mL): 30 mL    Voided (mL): 200 mL  Total OUT: 300 mL    Total NET: -300 mL      Physical Examination:  General Appearance: NAD,   Heart: RRR  Lungs: CTABL  Abdomen:  Soft, non tender, nondistended. No.   MSK/Extremities: Warm & well-perfused. Peripheral pulses intact.  bilateral breast: Dressings in place, clean, dry and intact, no signs of infection/active bleeding/drainage/hematoma, 2jp drains ss. surgical bra in place      MEDICATIONS  (STANDING):  amLODIPine   Tablet 5 milliGRAM(s) Oral daily  chlorhexidine 4% Liquid 1 Application(s) Topical <User Schedule>  letrozole 2.5 milliGRAM(s) Oral daily  metoprolol succinate ER 50 milliGRAM(s) Oral daily    MEDICATIONS  (PRN):  acetaminophen     Tablet .. 650 milliGRAM(s) Oral every 6 hours PRN Temp greater or equal to 38C (100.4F), Mild Pain (1 - 3)  HYDROmorphone  Injectable 0.5 milliGRAM(s) IV Push every 10 minutes PRN Moderate Pain (4 - 6)  ketorolac   Injectable 15 milliGRAM(s) IV Push every 8 hours PRN Moderate Pain (4 - 6)  oxyCODONE    IR 5 milliGRAM(s) Oral every 6 hours PRN Severe Pain (7 - 10)

## 2022-12-07 NOTE — DISCHARGE NOTE NURSING/CASE MANAGEMENT/SOCIAL WORK - NSTRANSFERBELONGINGSDISPO_GEN_A_NUR
not applicable PAST MEDICAL HISTORY:  Essential hypertension     Lumbar herniated disc     Sciatica     Uncomplicated asthma, unspecified asthma severity last episode 3 yrs ago

## 2022-12-07 NOTE — CHART NOTE - NSCHARTNOTEFT_GEN_A_CORE
PACU ANESTHESIA ADMISSION NOTE      Procedure: Right modified radical mastectomy, excision of left breast core biopsy site with RF localizer  Post op diagnosis:  Breast cancer    __x__  Patent Airway    __x__  Full return of protective reflexes    ____  Full recovery from anesthesia / back to baseline     Vitals:  see anesthesia record    Mental Status:  _x___ Awake   _____ Alert   _____ Drowsy   _____ Sedated    Nausea/Vomiting:  ___x_ NO  ______Yes,   See Post - Op Orders          Pain Scale (0-10):  _____    Treatment: ____ None    ___x_ See Post - Op/PCA Orders    Post - Operative Fluids:   ____ Oral   __x__ See Post - Op Orders    Plan: Discharge:   ____Home       __x___Floor     _____Critical Care    _____  Other:_________________    Comments:  Uneventful intraoperative course. No anesthesia issues or complications noted. Patient stable upon arrival to PACU. Report given to RN.
Post Operative Note  Patient: ABHINAV BOYKIN 72y (1950) Female   MRN: [id number]  Location: [facility]  Visit: 12-06-22 Outpatient  Date: 12-06-22 @ 22:29    Procedure: Invasive ductal carcinoma of right breast    Radial scar of left breast     S/P Right modified radical mastectomy    Excisional biopsy of left breast    Subjective:   Nausea: yes, Vomiting:  no, Ambulating: no, Flatus: no  Pain Assessment: Patient is complaining of mild pain that is appropriate for post-operative course.     Objective:  Vitals: T(F): 96.8 (12-06-22 @ 20:00), Max: 98.4 (12-06-22 @ 13:07)  HR: 80 (12-06-22 @ 22:00)  BP: 113/59 (12-06-22 @ 22:00) (113/59 - 161/97)  RR: 16 (12-06-22 @ 22:00)  SpO2: 97% (12-06-22 @ 22:00)  Vent Settings:     In:   12-06-22 @ 07:01  -  12-06-22 @ 22:29  --------------------------------------------------------  IN: 0 mL      IV Fluids:     Out:   12-06-22 @ 07:01  -  12-06-22 @ 22:29  --------------------------------------------------------  OUT: 200 mL      EBL:     Voided Urine:   12-06-22 @ 07:01  -  12-06-22 @ 22:29  --------------------------------------------------------  OUT: 200 mL      Lees Catheter: yes no   Drains:   SARAH:    ,   Chest Tube:      NG Tube:       Physical Examination:  General Appearance: NAD,   Heart: RRR  Lungs: CTABL  Abdomen:  Soft, non tender, nondistended. No.   MSK/Extremities: Warm & well-perfused. Peripheral pulses intact.  bilateral breast: Dressings in place, clean, dry and intact, no signs of infection/active bleeding/drainage/hematoma, 2jp drains ss. surgical bra in place    Medications: [Standing]  acetaminophen     Tablet .. 650 milliGRAM(s) Oral every 6 hours PRN  amLODIPine   Tablet 5 milliGRAM(s) Oral daily  chlorhexidine 4% Liquid 1 Application(s) Topical <User Schedule>  HYDROmorphone  Injectable 0.5 milliGRAM(s) IV Push every 10 minutes PRN  ketorolac   Injectable 15 milliGRAM(s) IV Push every 8 hours PRN  letrozole 2.5 milliGRAM(s) Oral daily  metoprolol succinate ER 50 milliGRAM(s) Oral daily  oxyCODONE    IR 5 milliGRAM(s) Oral every 6 hours PRN    Medications: [PRN]  acetaminophen     Tablet .. 650 milliGRAM(s) Oral every 6 hours PRN  amLODIPine   Tablet 5 milliGRAM(s) Oral daily  chlorhexidine 4% Liquid 1 Application(s) Topical <User Schedule>  HYDROmorphone  Injectable 0.5 milliGRAM(s) IV Push every 10 minutes PRN  ketorolac   Injectable 15 milliGRAM(s) IV Push every 8 hours PRN  letrozole 2.5 milliGRAM(s) Oral daily  metoprolol succinate ER 50 milliGRAM(s) Oral daily  oxyCODONE    IR 5 milliGRAM(s) Oral every 6 hours PRN    Imaging:  No post-op imaging studies    Assessment:  72yF s/p Left breast lumpectomy, Right modified radical mastectomy with axillary LN dissection     Plan:  - keep surgical bra  - monitor sarah output  - right arm precautions,   - right arm elevations   - VNS for drain care   - Monitor vitals  - Continue Pain Medications if necessary  - Encourage ambulation as tolerated  - Monitor wound and dressing for changes, redress as needed.    Date/Time: 12-06-22 @ 22:29
Drain Care Instructions    Patient has 2 Dirk-Alvarez drains, one along her Right pectoralis major muscle and one in her Right axilla.    Please empty each drain and record output amount and color on a piece of paper along with the date and time of emptying.  Please keep the 2 drain outputs separate and track their outputs separately.  Please have the patient take this paper with her when she sees Dr. Vance, the surgeon, in the office.    Please change the drain dressings once a day or more frequently as needed with 4x4 guaze and paper tape.    [facility]  Spectra [phone]

## 2022-12-07 NOTE — DISCHARGE NOTE PROVIDER - HOSPITAL COURSE
72F PMHx of HTN and colon cancer s/p sigmoidectomy w/ colostomy s/p reversal presents for s/p right radical mastectomy with right axillary lymph node dissection and left breast excisional biopsy for right breast invasive ductal carcinoma and left breast radial scar. Intraoperative findings included: Left lumpectomy for radial scar found on mammography. Prior to OR two radiofrequency tags were placed by radiology (#s [id number], [id number]). Both areas identified at the lateral breast via radiotracer. Confirmed tags in both specimens. Right modified radical mastectomy performed. Hemostasis achieved. Two ALONZO drains placed, one along the pectoralis major and the other in the axilla.    Post op day 1, patient is recovering well. She states her pain is under control. Visiting nursing services have been set up for drain care. Physical therapy has evaluated the patient and deemed she has no needs. Patient is currently medically stable for discharge.

## 2022-12-07 NOTE — DISCHARGE NOTE PROVIDER - REASON FOR ADMISSION
s/p right radical mastectomy with right axillary lymph node dissection and left breast excisional biopsy

## 2022-12-07 NOTE — DISCHARGE NOTE NURSING/CASE MANAGEMENT/SOCIAL WORK - PATIENT PORTAL LINK FT
You can access the FollowMyHealth Patient Portal offered by [facility] by registering at the following website: [email or web address]. By joining [facility]’s FollowMyHealth portal, you will also be able to view your health information using other applications (apps) compatible with our system.

## 2022-12-07 NOTE — PHYSICAL THERAPY INITIAL EVALUATION ADULT - ASSISTIVE DEVICE FOR TRANSFER: STAND/SIT, REHAB EVAL
Post-Care Instructions: I reviewed with the patient in detail post-care instructions. Patient is to wear sunprotection, and avoid picking at any of the treated lesions. Pt may apply Vaseline to crusted or scabbing areas. Show Applicator Variable?: Yes Render Post-Care Instructions In Note?: no Detail Level: Detailed Medical Necessity Clause: This procedure was medically necessary because the lesions that were treated were: Consent: The patient's consent was obtained including but not limited to risks of crusting, scabbing, blistering, scarring, darker or lighter pigmentary change, recurrence, incomplete removal and infection. Medical Necessity Information: It is in your best interest to select a reason for this procedure from the list below. All of these items fulfill various CMS LCD requirements except the new and changing color options. Spray Paint Text: The liquid nitrogen was applied to the skin utilizing a spray paint frosting technique. No AD

## 2022-12-07 NOTE — DISCHARGE NOTE PROVIDER - NSDCMRMEDTOKEN_GEN_ALL_CORE_FT
amLODIPine 5 mg oral tablet: 1 tab(s) orally once a day MDD:1 tablet  calcium (as carbonate) 600 mg oral tablet: 1 tab(s) orally once a day  chlorthalidone 25 mg oral tablet: 0.5 tab(s) orally once a day MDD:1/2 tablet  letrozole 2.5 mg oral tablet: 1 tab(s) orally once a day  metoprolol succinate 50 mg oral tablet, extended release: 1 tab(s) orally once a day  oxycodone-acetaminophen 5 mg-300 mg oral tablet: 1 tab(s) orally every 6 hours MDD:Please take 1-2 tabs by mouth as needed for severe pain. Please do not take more than 4 tabs in a 24 hour period.  Vitamin D3 25 mcg (1000 intl units) oral tablet: 1 tab(s) orally once a day

## 2022-12-07 NOTE — DISCHARGE NOTE PROVIDER - NSDCCPCAREPLAN_GEN_ALL_CORE_FT
PRINCIPAL DISCHARGE DIAGNOSIS  Diagnosis: Invasive ductal carcinoma of breast  Assessment and Plan of Treatment: You have undergone Right breast modified radical mastectomy and axillary lymph node dissection.  Follow Up with Dr. Vance in 1 week. Please call office for confirmation of your appointment.  Diet: Regular diet  Showers: You can start showering tomorrow after removing your surgical bra. Please allow water to run over the wounds but please avoid vigorously rubbibg the wounds themselves.  Surgical Bra: Please wear your surgical bra 24/7 until you see Dr. Vance in the office. You can remove it while shower starting tomorrow.   Activity: Please follow the exercises taught to you by physical therapy. Please avoid doing any heavy lifting for the next several weeks to allow your wounds to heal.   Pain: You can take over the counter medications such as Tylenol, and Ibuprofen for pain control. Please adhere to the instructions on the back of the bottle. If it was discussed that you would be receiving prescription pain medication upon discharge, this prescription will be sent to your pharmacy. Please do not drive or operate heavy machinery while taking narcotic medication.   If you develop fevers, chills, worsening pain, increased drainage from the wound, foul smelling drainage from the wound, nausea that won't subside, vomiting, or any other symptoms of concern, please call MD for further advice, evaluation, and/or treatment.   Activity: Ambulate and get out of bed as tolerated, and with assistance if feeling weak.  VNS: A nurse will come to your house starting tomorrow to help take care of your drains.      SECONDARY DISCHARGE DIAGNOSES  Diagnosis: Radial scar of left breast  Assessment and Plan of Treatment: You have undergone L breast excisional biopsy. Please follow the above instructions.

## 2022-12-07 NOTE — PROGRESS NOTE ADULT - ATTENDING COMMENTS
Patient seen on morning rounds with surgical resident, in PACU overnight, alert and stable.  She is voiding well, tolerating oral intake, with no complaint of postsurgical pain.    Surgical bra and dressings are clean, dry, intact.  Right mastectomy flaps appear healthy, no evidence of hematoma.  No right upper extremity edema, ROM of upper extremities is normal.  ALONZO drain outputs noted, serosanguineous.    Operative findings and procedure were reviewed with the patient.  She is ready for discharge after home care arrangements have been confirmed.  Full wound care,  drain care, diet, activity instructions were given along with instructions regarding right arm precautions and right upper extremity ROM exercises.  She will follow-up in the office within the next 7 to 10 days.  All questions were answered.   Will follow-up pathology as outpatient.

## 2022-12-07 NOTE — DISCHARGE NOTE NURSING/CASE MANAGEMENT/SOCIAL WORK - NSDCPEFALRISK_GEN_ALL_CORE
For information on Fall & Injury Prevention, visit: [email or web address] OR  [email or web address] OR  https://www.cdc.gov/steadi/patient.html

## 2022-12-07 NOTE — DISCHARGE NOTE PROVIDER - CARE PROVIDER_API CALL
Nain Vance)  Surgery  [address] [place] [address], [address] [place] [address]  Phone: [phone]  Fax: [phone]  Follow Up Time: 1 week

## 2022-12-07 NOTE — DISCHARGE NOTE PROVIDER - NSDCFUSCHEDAPPT_GEN_ALL_CORE_FT
Roland Fritz  [facility]  GENSURG [address]  Scheduled Appointment: 12/13/2022    Arcelia Jessica  [facility]  HEMONC [address]  Scheduled Appointment: 12/28/2022    [facility]  Chemo & Infus [address]   Scheduled Appointment: 12/28/2022    Humza Huang  [facility]  Cardio [address]  Scheduled Appointment: 02/02/2023

## 2022-12-07 NOTE — PROGRESS NOTE ADULT - ASSESSMENT
Assessment:  72yF s/p Left breast lumpectomy, Right modified radical mastectomy with axillary LN dissection     Plan:  - keep surgical bra  - monitor sarah output  - right arm precautions,   - right arm elevations   - VNS for drain care   - Monitor vitals  - Continue Pain Medications if necessary  - Encourage ambulation as tolerated  - Monitor wound and dressing for changes, redress as needed.

## 2022-12-07 NOTE — PHYSICAL THERAPY INITIAL EVALUATION ADULT - GENERAL OBSERVATIONS, REHAB EVAL
11:00-11:30. chart reviewed. Pt received semi-poon at B/S, alert, oriented, able to follow multi-step instructions and agreeable to PT evaluation. + monitoring, + ALONZO drain X 2, denies pain or discomfort. VSS, Pt demonstrates baseline mobility, able to ambulate without AD, negotiate 7 steps reciprocal pattern. using 1 HR. Pt is not candidate for PT at this time. further PT referral PRN.

## 2022-12-14 ENCOUNTER — APPOINTMENT (OUTPATIENT)
Dept: SURGERY | Facility: CLINIC | Age: 72
End: 2022-12-14

## 2022-12-14 VITALS
DIASTOLIC BLOOD PRESSURE: 70 MMHG | OXYGEN SATURATION: 98 % | BODY MASS INDEX: 30.37 KG/M2 | TEMPERATURE: 96.4 F | HEART RATE: 97 BPM | SYSTOLIC BLOOD PRESSURE: 118 MMHG | WEIGHT: 182.25 LBS | HEIGHT: 65 IN

## 2022-12-14 PROCEDURE — 99213 OFFICE O/P EST LOW 20 MIN: CPT

## 2022-12-14 PROCEDURE — 99024 POSTOP FOLLOW-UP VISIT: CPT

## 2022-12-14 NOTE — ASSESSMENT
[FreeTextEntry1] :   No postoperative problems noted.  The wounds were redressed and local care and activity instructions were reviewed.  She will return here next week to see Dr. De La Cruz  in my absence, for removal of the remaining drain or contact him sooner should the drain stopped functioning.   She will also follow-up with her oncologist later this month as scheduled, by which time the surgical pathology should be ready for discussion.  She will also see me again in about 1 month for continued postop follow-up.  Arrangements for postmastectomy bras  and a breast prosthesis can be made at that time.  All their questions were answered and they understand and agree.

## 2022-12-14 NOTE — PHYSICAL EXAM
[de-identified] :  healthy [de-identified] :  right mastectomy incision is clean and dry, no evidence of wound hematoma or infection.  Incision is intact, Steri-Strips in place.  ALONZO drain outputs noted.  The axillary drain was removed as its output has been less than 30 cc/day for the last several days.  Both drain outputs are light serosanguineous.  No right upper extremity edema, good range of motion, no numbness or paresthesias of the right lateral chest wall or upper inner arm.

## 2022-12-14 NOTE — HISTORY OF PRESENT ILLNESS
[de-identified] : The patient comes with her  for her first postoperative visit after the recent right modified radical mastectomy.  She looks and feels well and has no specific complaints.  She has no problems with pain control or range of motion of her right upper extremity.  She has some numbness of the right chest wall below the incision, but none involving the right arm and she does not complain of any lymphedema.

## 2022-12-14 NOTE — H&P PST ADULT - PRO INTERPRETER NEED 2
English Cellcept Counseling:  I discussed with the patient the risks of mycophenolate mofetil including but not limited to infection/immunosuppression, GI upset, hypokalemia, hypercholesterolemia, bone marrow suppression, lymphoproliferative disorders, malignancy, GI ulceration/bleed/perforation, colitis, interstitial lung disease, kidney failure, progressive multifocal leukoencephalopathy, and birth defects.  The patient understands that monitoring is required including a baseline creatinine and regular CBC testing. In addition, patient must alert us immediately if symptoms of infection or other concerning signs are noted.

## 2022-12-15 NOTE — ASSESSMENT
[FreeTextEntry1] : 71F with concurrent breast and obstructing colon cancer s/p diverting loop colostomy now s/p robotic sigmoidectomy and colostomy closure with T3N0 cancer with LVI\par \par Patient has healed well.  We will see her back in 3 months.  She is due for repeat CTCAP and colonoscopy in 2/2023

## 2022-12-15 NOTE — HISTORY OF PRESENT ILLNESS
[FreeTextEntry1] : Patient is a 72F with PMH of right breast mass biopsy proven ductal carcinoma with mucinous features,  s/p transverse loop colostomy creation for LBO secondary to obstructing sigmoid adenocarcinoma no presents for follow up s/p robotic sigmoidectomy and colostomy closure.  Pathology showed T3N0 (0/22LN) moderately differentiated adenocarcinoma with LVI. She is tolerating a diet and having daily BM.  She will not get chemotherapy for her colon cancer.  She completed chemotherapy 9/7/22 for her breast ca. She was started on letrozole expected to stay on for 5 years. PT had an abdominal MR on 9/25/22 that showed an inconclusive liver lesion. Liver biopsy was negative for malignancy. Pt is also s/p breast surgery with Dr Vance on 12/6/2022.   Patient states she  feels well.  She is tolerating a diet .Patient denies fevers, chills, nausea, vomiting, abdominal pain, constipation, diarrhea, blood in his stool or unexpected weight loss.  Patient denies a family history of colon cancer rectal cancer or inflammatory bowel disease. PT is due for repeat Ct scans in February, as well as a repeat colonoscopy in February or when cleared from her breast CA treatments  Patient has not had a full colonoscopy
03-Oct-2022 04:03

## 2022-12-15 NOTE — CONSULT LETTER
[Dear  ___] : Dear  [unfilled], [Courtesy Letter:] : I had the pleasure of seeing your patient, [unfilled], in my office today. [Please see my note below.] : Please see my note below. [Consult Closing:] : Thank you very much for allowing me to participate in the care of this patient.  If you have any questions, please do not hesitate to contact me. [FreeTextEntry3] : Sincerely,\par \par Roland Fritz MD, Colon and Rectal Surgery\par Director of Colon and Rectal Surgery\par \par [employer] [facility]\par [address] [place] [address]\par [facility], 3rd Floor\par [place], [place] [address]\par Tel [phone] ext [phone]\par Fax [phone]\par  [DrLexis  ___] : Dr. SALES

## 2022-12-16 LAB — SURGICAL PATHOLOGY STUDY: SIGNIFICANT CHANGE UP

## 2022-12-23 ENCOUNTER — APPOINTMENT (OUTPATIENT)
Dept: SURGERY | Facility: CLINIC | Age: 72
End: 2022-12-23

## 2022-12-23 VITALS
DIASTOLIC BLOOD PRESSURE: 90 MMHG | OXYGEN SATURATION: 97 % | BODY MASS INDEX: 30.32 KG/M2 | SYSTOLIC BLOOD PRESSURE: 140 MMHG | HEIGHT: 65 IN | TEMPERATURE: 97.3 F | HEART RATE: 100 BPM | WEIGHT: 182 LBS

## 2022-12-23 PROCEDURE — 99024 POSTOP FOLLOW-UP VISIT: CPT

## 2022-12-23 NOTE — HISTORY OF PRESENT ILLNESS
[de-identified] : This patient is status post a right modified radical mastectomy done by Dr. Vance.  She presents today to evaluate her drain.  She reports that the output has been serous and has maybe 10 to 15 cc/day.  She has had no issues with her incision.  Her other drain was taken out over a week ago.

## 2022-12-28 ENCOUNTER — APPOINTMENT (OUTPATIENT)
Dept: HEMATOLOGY ONCOLOGY | Facility: CLINIC | Age: 72
End: 2022-12-28
Payer: MEDICARE

## 2022-12-28 ENCOUNTER — APPOINTMENT (OUTPATIENT)
Dept: INFUSION THERAPY | Facility: CLINIC | Age: 72
End: 2022-12-28
Payer: MEDICARE

## 2022-12-28 VITALS
TEMPERATURE: 98.6 F | WEIGHT: 182 LBS | HEIGHT: 65 IN | BODY MASS INDEX: 30.32 KG/M2 | RESPIRATION RATE: 20 BRPM | OXYGEN SATURATION: 99 %

## 2022-12-28 PROCEDURE — 99214 OFFICE O/P EST MOD 30 MIN: CPT

## 2022-12-30 NOTE — RESULTS/DATA
[FreeTextEntry1] : Pathology report from 22:\par Accession:                             [id number]\par \par Collected Date/Time:                   2022 15:48 EST\par Received Date/Time:                    2022 16:23 EST\par \par Addendum Report - Auth (Verified)\par \par Addendum\par 3) HER2 IHC Analysis was performed at [facility].\par Their report is attached below.\par ----------------------------------------------------------\par [facility]\par [address]\[place]  [address]\par [phone]\par \par INTEGRATED SPECIMEN#: [id number]\par INTEGRATED CASE#: [id number]\par DATE SIGNED OUT: 22 by Celina Pagan M.D. Ph.D\par HOSPITAL ID#: [id number]\par \par BODY SITE: Breast, right\par \par CLINICAL DATA:  Colloid carcinoma, S/P neoadjuvant chemotherapy\par \par MARKER               RESULTS            INTERPRETATION\par HER2 IHC                  0\par Negative\par \par Fixation Information :\par \par Fixative: Formalin\par Time of Fixation: <1 hour\par Duration of Fixation: >9 hour(s)\par Note:  Time to fixation = cold ischemic time\par \par HER2 IHC Results :\par Complete membrane stainin%\par Uniform staining: Absent\par Homogeneous, dark circumferential pattern: Absent\par Sample Adequate for analysis: Yes\par \par This interpretation or diagnosis is contingent on the specimen and\par the clinical information received. This analysis is an adjunct to the\par evaluation of the referring physician and does not represent a final\par diagnosis.\par \par This test was developed and its performance characteristics have been\par determined by [facility].  It has not been\par cleared or approved by the FDA.  The FDA has determined that such\par clearance or approval is not necessary. The laboratory is regulated under\par the Clinical Laboratory Improvement Amendment of 1988 (CLIA) as qualified\par to perform high complexity testing.\par \par [facility] is a business unit of [employer], a wholly-owned subsidiary of [employer].\par \par Complete report is available in the electronic medical record as a\par PATHOLOGY CONSULT REPORT\par \par Verified by: Fabien Elias M.D.\par (Electronic Signature)\par Reported on: 22 12:18 EST, [facility],\par [address], [place] [address]\par Phone: [phone]   Fax: [phone]\par _________________________________________________________________\par Surgical Pathology Report - Auth (Verified)\par \par Specimen(s) Submitted\par  E 1  Left breast core biopsy anterior\par Time obtained: 3:48 pm\par Cold ischemic time: <1 hour\par 2  Left breast core biopsy posterior\par Time obtained: 3:52 pm\par Cold ischemic time: <1 hour\par 3  Right breast\par Time obtained: 5:33 pm\par Cold ischemic time: <1 hour\par 4  Right axillary fat pad\par Time obtained: 5:38 pm\par Cold ischemic time: <1 hour\par \par \par Final Diagnosis\par 1.  Breast, left anterior core biopsy site, radiofrequency seed localized\par lumpectomy:\par - Radial sclerosing lesion (radial scar) located adjacent to a healing\par prior biopsy site.\par - Benign atrophic breast tissue with fibrocystic changes, both\par proliferative and non-proliferative types, associated with numerous\par microcalcifications.\par - Focal mammary duct ectasia.\par \par 2.  Breast, left posterior core biopsy site, radiofrequency seed localized\par lumpectomy:\par - Radial sclerosing lesion (radial scar).\par - Benign atrophic breast tissue with fibrocystic changes, both\par proliferative and non-proliferative types, associated with\par microcalcifications.\par - Healing prior biopsy site.\par \par 3.  Breast, right, modified radical mastectomy:\par - Large fibrous tumor bed in the upper inner and upper outer quadrants\par with necrosis, chronic inflammation, coarse stromal calcifications, and\par a remote prior biopsy site along with abundant acellular mucin except\par for a 1.4 cm (microscopic measurements) focus of residual invasive well\par differentiated ductal carcinoma (colloid carcinoma).\par - No intraductal component nor lymphovascular invasion is seen.\par - Atrophic fatty breast tissue with a radial sclerosing lesion\par (radial scar), proliferative type fibrocystic changes associated with\par microcalcifications, and diffuse histologic changes consistent with prior\par systemic therapy effect.\par - Unremarkable nipple, skin, and deep fascial margin.  Negative for\par carcinoma.\par - Pending special studies for Her2/SIRI oncoprotein expression and/or gene\par amplification, with the results to follow and be reported as a separate\par addendum.\par

## 2022-12-30 NOTE — ASSESSMENT
[FreeTextEntry1] : 70 yo female has synchronous colon cancer and breast cancer:\par - Invasive moderately differentiated adenocarcinoma of the sigmoid colon, MMR proficient, s/p robotic sigmoidectomy and colostomy. The pathologic stage is IIA ([id number]).\par - Invasive well to moderately differentiated ductal carcinoma of the right breast with dominant mucinous features (colloid carcinoma), ER pos %, VA negative, Her-2 negative. Clinical stage is cT3N1.\par \par Assessment and Plan:\par #  Invasive well to moderately differentiated ductal carcinoma of the right breast, ER positive, VA and Her-2 negative, clinical stage cT3N1.\par -- Completed  AC x 4 and weekly Taxol x 12. \par -- Breast MRI Unchanged biopsy-proven malignant right breast mass with associated skin thickening. Interval reduction in size of prior noted prominent right axillary lymph node likely treatment related. No MRI evidence of malignancy in the left breast.\par -- Restaging CT C/A/P showed Unchanged bilateral few pulmonary nodules measuring up to 4 mm. Interval decrease in size of previously enlarged right axillary lymph node. Indeterminate hyperenhancing 1.5 cm right hepatic lobe lesion. MRI abdomen showed Hyperenhancing right hepatic lobe lesion, demonstrating restricted diffusion, indeterminate. Metastasis is on differential. On 10/13/22, CT guided bx of the right liver lesion is Negative for malignancy. The pathology showed focally distorted hepatic parenchyma. Differential diagnosis include nodular regenerative hyperplasia, focal nodular hyperplasia and hepatocellular adenoma. \par -- She will continue Letrozole.\par -- S/P Right breast radical mastectomy on 12/6/22. \par  pathology report discussed with patient and her  on 12/28/22\par  She has Left breast Lumpectomy \par  Referral to [facility] , we contacted Martita  from [facility] on EXT [id number]\par -- Bone density was normal. Advised to continue calcium and vitamin D and  regular exercise. \par \par # IIA ([id number]) adenocarcinoma of the sigmoid colon, MMR proficient, s/p robotic sigmoidectomy. \par -- She has a couple of high risk factors including bowel obstruction and positive lymphovascular invasion but 22 lymph node were negative. Benefit adjuvant adjuvant chemotherapy was likely to be small. In addition, she needed to move onto her breast cancer treatment. \par -- Followup with GI for surveillance colonoscopy.\par \par RTC 4 weeks.\par \par

## 2022-12-30 NOTE — PHYSICAL EXAM
[Fully active, able to carry on all pre-disease performance without restriction] : Status 0 - Fully active, able to carry on all pre-disease performance without restriction [Normal] : affect appropriate [de-identified] : Persistent large right breast mass and size reduced. No palpable right axillary lymph node.

## 2022-12-30 NOTE — HISTORY OF PRESENT ILLNESS
[de-identified] : 72 yo female was referred by Dr. Quinonez for consultation of breast cancer. Jesica initially presented to hospital for constipation and abdominal pain. CT a/p on 11/24/21 showed  large bowel obstruction secondary to a likely malignant stricture in the proximal sigmoid colon, small volume ascites and lobulated, partially visualized right breast mass and left breast nodule. Physical exam correlation and correlation with dedicated breast imaging was recommended as malignancy is of concern.\par \par On 11/26/21, CT chest showed right and left breast masses. The right breast mass measured 6.5 x 3.8 x 7.0 cm and contained small calcifications. The left breast mass measured 3.1 x 2.9 x 2.6 cm.\par \par On 11/29/21, colonoscopy revealed sigmoid colon mass and and biopsy showed invasive adenocarcinoma, moderately differentiated. MMR proteins were all expressed. 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 \par \par On 12/21/21, b/l breast dx mammo and US were performed which showed 10 cm mass at the 11 to 1:00 position 3 to 4 cm from the nipple corresponding to the biopsy-proven right breast carcinoma.  In the right axilla, at the 11:00 position 13 cm from the nipple, there is an abnormal lymph node measuring 2.3 x 1.3 x 0.9 cm. Ultrasound-guided biopsy is recommended. In the left breast, there are scattered similar appearing subcentimeter circumscribed masses seen throughout the left breast. These correspond to mammographically demonstrated masses:\par At the 3:00 position 6 cm from the nipple, there is a mass measuring 0.4 x 0.3 x 0.3 cm. Ultrasound-guided biopsy of this mass is recommended.\par At the 7 to 8:00 position 5 cm from the nipple, there is a mass measuring 0.4 x 0.3 x 0.2 cm.\par At the 8 to 9:00 position 4 cm from the nipple, there is a circumscribed mass measuring 0.4 x 0.4 x 0.3 cm.\par At the 8 to 9:00 position 3 cm from nipple, corresponding to area palpable concern and mammographic findings, there is a heterogeneous mass measuring 2.5 x 2.3 x 2.0 cm. Ultrasound-guided biopsy is recommended.\par \par On 12/30/21, left breast 3 N6 mass, ultrasound guided needle core biopsies revealed radial sclerosing lesion (radial scar) and Benign atrophic breast tissue with proliferative type fibrocystic changes. Left breast 8-9 N3 mass, ultrasound guided needle core biopsies showed single minute fragment of benign atrophic breast tissue.  \par The right axillary mass, ultrasound guided needle core biopsies were positive for moderately differentiated adenocarcinoma, most likely representative of metastasis from the patient's known mammary primary carcinoma to an axillary lymph node with mervat replacement, obliteration, and extensive extracapsular extension.\par \par On 1/19/22, left breast relatively posterior calcifications, stereotactic guided vacuum assisted needle core biopsies showed radial sclerosing lesion (radial scar) and benign atrophic breast tissue with proliferative type fibrocystic changes. Left relatively anterior calcifications, stereotactic guided vacuum assisted needle core biopsies showed benign atrophic breast tissue with proliferative type fibrocystic changes.  \par \par On 1/5/22, she had b/l breast MRI which showed 8 cm biopsy-proven right breast index carcinoma and axillary mervat metastasis. Biopsy-proven left breast radial scar with no additional sites of suspicious enhancement.\par \par On 2/17/22, she underwent robotic sigmoidectomy and colostomy. The pathology revealed Invasive moderately differentiated adenocarcinoma (6 cm in greatest dimension), invading through muscularis propria into pericolorectal tissue (pT3), +LVI.  All margins were negative for invasive carcinoma.  Twenty two lymph nodes were negative for metastatic carcinoma. She recovered well from surgery. \par \par She is here today with her  to discuss breast cancer treatment. She has no family h/o breast cancer or any other kinds of cancer.  [de-identified] : 4/21/22:susanne Mooney is here today for followup and chemotherapy. She has synchronous colon cancer and breast cancer: invasive moderately differentiated adenocarcinoma of the sigmoid colon, MMR proficient, s/p robotic sigmoidectomy and colostomy. 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 Clinical stage is cT3N1. Her staging CT and bone scan did not show evidence of distant mets. She is here today to start neoadjuvant chemotherapy for breast cancer. \par \par 5/9/22:susanne Mooney is here today for followup and chemotherapy. She has synchronous colon cancer and breast cancer: invasive moderately differentiated adenocarcinoma of the sigmoid colon, MMR proficient, s/p robotic sigmoidectomy and colostomy. 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 Clinical stage is cT3N1. Her staging CT and bone scan did not show evidence of distant mets. She started on neoadjuvant chemotherapy for breast cancer. She had 1st cycle AC 2 weeks ago and tolerated well. \par \par 5/23/22susanne Mooney is here today for followup and chemotherapy. She has synchronous colon cancer and breast cancer: invasive moderately differentiated adenocarcinoma of the sigmoid colon, MMR proficient, s/p robotic sigmoidectomy and colostomy. 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 Clinical stage is cT3N1. Her staging CT and bone scan did not show evidence of distant mets. She started on neoadjuvant chemotherapy. To date, she has had 2 cycles and tolerated well. She did not have fever, mouth sore, vomiting or diarrhea. \par \par 6/6/22susanne Mooney is here today for followup and chemotherapy. She has synchronous colon cancer and breast cancer: invasive moderately differentiated adenocarcinoma of the sigmoid colon, MMR proficient, s/p robotic sigmoidectomy and colostomy. 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 Clinical stage is cT3N1. Her staging CT and bone scan did not show evidence of distant mets. She has been on neoadjuvant chemotherapy with dose dense AC. To date, she has had 3 cycles and tolerated well. She did not have fever, mouth sore, vomiting or diarrhea. She does endorse some burning/tearing sensation in the eye. \par \par 6/20/22:susanne Mooney is here today for followup and chemotherapy. She has synchronous colon cancer and breast cancer: invasive moderately differentiated adenocarcinoma of the sigmoid colon, MMR proficient, s/p robotic sigmoidectomy and colostomy. 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 Clinical stage is cT3N1. Her staging CT and bone scan did not show evidence of distant mets. She has been on neoadjuvant chemotherapy. She completed dose dense AC x 4. She feels more fatigue. Her eye burning is getting better.\par \par 7/11/22susanne Mooney is here for follow up and chemotherapy.  She has synchronous colon cancer and breast cancer: invasive moderately differentiated adenocarcinoma of the sigmoid colon, MMR proficient, s/p robotic sigmoidectomy and colostomy. 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 Clinical stage is cT3N1. Her staging CT and bone scan did not show evidence of distant mets. She has been on neoadjuvant chemotherapy. She completed dose dense AC x 4. She completed 3 doses of weekly Taxol so far. She feels well. Her energy level is better and burning of the eyes is resolved. She followed up with Colorectal [facility] and they want to wait until Taxol is finished for her chemotherapy.\par \par 09/7/22susanne Mooney is here for follow up and chemotherapy.  She has synchronous colon cancer and breast cancer: invasive moderately differentiated adenocarcinoma of the sigmoid colon, MMR proficient, s/p robotic sigmoidectomy and colostomy. 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 Clinical stage is cT3N1. Her staging CT and bone scan did not show evidence of distant mets. She has been on neoadjuvant chemotherapy. She completed dose dense AC x 4. Currently, she is on weekly Taxol. To date, she has had 11 treatments. She tolerated chemotherapy well. \par \par 10/5/22\svitlana Mooney is here for follow up. She has synchronous colon cancer and breast cancer: invasive moderately differentiated adenocarcinoma of the sigmoid colon, MMR proficient, s/p robotic sigmoidectomy and colostomy. 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 Clinical stage is cT3N1. Her staging CT and bone scan did not show evidence of distant mets. She completed neoadjuvant chemotherapy with dose dense AC x 4 and weekly Taxol x 12. She started letrozole on 9/15 and tolerating well. She had breast MRI 9/22 showed biopsy-proven malignant right breast mass with associated skin thickening. Interval reduction in size of prior noted prominent right axillary lymph node likely treatment related. No MRI evidence of malignancy in the left breast. She also had restaging CT C/A/P which showed Unchanged bilateral few pulmonary nodules measuring up to 4 mm. Interval decrease in size of previously enlarged right axillary lymph node. Indeterminate hyperenhancing 1.5 cm right hepatic lobe lesion. MRI abdomen on 9/25/22 showed Hyperenhancing right hepatic lobe lesion, demonstrating restricted diffusion, indeterminate. Metastasis is on differential. She is pending biopsy of liver lesion. \par \par 11/2/22:susanne Mooney is here for follow up. She has synchronous colon cancer and breast cancer: invasive moderately differentiated adenocarcinoma of the sigmoid colon, MMR proficient, s/p robotic sigmoidectomy and colostomy. 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 Clinical stage is cT3N1. She completed neoadjuvant chemotherapy with dose dense AC x 4 and weekly Taxol x 12. She started letrozole on 9/15 and tolerating well.  She had restaging CT C/A/P which showed Unchanged bilateral few pulmonary nodules measuring up to 4 mm. Interval decrease in size of previously enlarged right axillary lymph node. Indeterminate hyperenhancing 1.5 cm right hepatic lobe lesion. MRI abdomen on 9/25/22 showed Hyperenhancing right hepatic lobe lesion, demonstrating restricted diffusion, indeterminate. On 10/13/22, she had CT guided bx of the right liver lesion which is Negative for malignancy. The pathology showed focally distorted hepatic parenchyma. Differential diagnosis include nodular regenerative hyperplasia, focal nodular hyperplasia and hepatocellular adenoma. \par \par \par 12/28/22:\par Jesica is here for follow up. She has synchronous colon cancer and breast cancer: invasive moderately differentiated adenocarcinoma of the sigmoid colon, MMR proficient, s/p robotic sigmoidectomy and colostomy. \par  Patient reports feeling well, Right breast healing very well, no discharges.  \par On 12/8/22 S/P Right Modified radical mastectomy. left breast lumpectomy \par Post surgical Pathology report :\par Final Diagnosis\par 1.  Breast, left anterior core biopsy site, radiofrequency seed localized\par lumpectomy:\par - Radial sclerosing lesion (radial scar) located adjacent to a healing\par prior biopsy site.\par - Benign atrophic breast tissue with fibrocystic changes, both\par proliferative and non-proliferative types, associated with numerous\par microcalcifications.\par \par 2.  Breast, left posterior core biopsy site, radiofrequency seed localized\par lumpectomy:\par - Radial sclerosing lesion (radial scar).\par - Benign atrophic breast tissue with fibrocystic changes, both\par proliferative and non-proliferative types, associated with\par microcalcifications.\par - Healing prior biopsy site.\par \par

## 2023-01-11 ENCOUNTER — OUTPATIENT (OUTPATIENT)
Dept: OUTPATIENT SERVICES | Facility: HOSPITAL | Age: 73
LOS: 1 days | End: 2023-01-11
Payer: MEDICARE

## 2023-01-11 ENCOUNTER — APPOINTMENT (OUTPATIENT)
Dept: RADIATION ONCOLOGY | Facility: HOSPITAL | Age: 73
End: 2023-01-11
Payer: MEDICARE

## 2023-01-11 VITALS
SYSTOLIC BLOOD PRESSURE: 182 MMHG | HEART RATE: 105 BPM | HEIGHT: 65 IN | RESPIRATION RATE: 16 BRPM | BODY MASS INDEX: 30.53 KG/M2 | TEMPERATURE: 97.6 F | DIASTOLIC BLOOD PRESSURE: 100 MMHG | OXYGEN SATURATION: 96 % | WEIGHT: 183.25 LBS

## 2023-01-11 DIAGNOSIS — C50.911 MALIGNANT NEOPLASM OF UNSPECIFIED SITE OF RIGHT FEMALE BREAST: ICD-10-CM

## 2023-01-11 PROCEDURE — 99204 OFFICE O/P NEW MOD 45 MIN: CPT | Mod: 25

## 2023-01-11 PROCEDURE — 77263 THER RADIOLOGY TX PLNG CPLX: CPT

## 2023-01-11 RX ORDER — PROCHLORPERAZINE MALEATE 10 MG/1
10 TABLET ORAL EVERY 6 HOURS
Qty: 30 | Refills: 2 | Status: DISCONTINUED | COMMUNITY
Start: 2022-04-21 | End: 2023-01-11

## 2023-01-11 RX ORDER — ONDANSETRON 8 MG/1
8 TABLET ORAL EVERY 8 HOURS
Qty: 30 | Refills: 3 | Status: DISCONTINUED | COMMUNITY
Start: 2022-04-21 | End: 2023-01-11

## 2023-01-12 PROCEDURE — 77333 RADIATION TREATMENT AID(S): CPT | Mod: 26

## 2023-01-12 PROCEDURE — 77290 THER RAD SIMULAJ FIELD CPLX: CPT | Mod: 26

## 2023-01-13 NOTE — END OF VISIT
[FreeTextEntry3] : MD Note: I was present with the N.P. during the key portions of the history and exam. I agree with the findings and plan as documented in the N.P's note, unless noted below. \par I was physically present for the key portions of the evaluation and management service provided. I agree with the history and physical, and plan which I have reviewed and edited where appropriate. [Time Spent: ___ minutes] : I have spent [unfilled] minutes of time on the encounter.

## 2023-01-13 NOTE — OB/GYN HISTORY
[Currently In Menopause] : currently in menopause [Menopause Age: ____] : patient was [unfilled] years old at menopause [___] : Living: [unfilled] [History of Birth Control Pills] : Patient has no history of taking birth control pills

## 2023-01-13 NOTE — HISTORY OF PRESENT ILLNESS
[FreeTextEntry1] : Ms Gonzalez is 72 year old female presenting today for initial consultation for Right breast Ca (ER positive/IA negative/Her 2 Negative)\par \par She palpated a right breast mass in 2021. She originally did not undergo any breast imaging at that time but came to ER with symptoms of constipation and abdominal pain in which she was found to have LBO secondary to sigmoid colon Cancer. She underwent a diverting colostomy by Dr Fritz in Nov/2021 and later also had a sigmoidectomy and colostomy reversal on 2/17/2022 for invasive adenocarcinoma, moderately differentiated. She has been following up with Dr Daniel and started chemo in 4/2022 up until 9/7/2022. She was started on Letrozole on 9/15/2022. On 12/6/2022 she underwent right breast modified radical mastectomy and left breast lumpectomy. She is doing well. she states she had tolerated having a colostomy for a few months and then was very happy it was able to be reversed in February 2022. She also states she has tolerated chemo as well and has recovered quickly with no complications from breast surgery in Dec/2022. \par \par Workup:\par 11/26/2021 right Breast Biopsy\par Final Diagnosis\par Breast, right mass, core biopsies:\par - Invasive well to moderately differentiated ductal carcinoma with\par dominant mucinous features (colloid carcinoma).\par \par 11/29/2021: Colonoscopy revealed sigmoid colon mass and biopsy showed invasive adenocarcinoma, moderately differentiated.MMR proteins were all expressed.\par \par 12/21/2021: Bilateral Diagnostic Mammogram\par Impression: Biopsy-proven right breast carcinoma. Right axillary adenopathy and indeterminate left breast 3:00 and 8-9:00 N3 masses under sonographic guidance is recommended. Intervention of additional calcifications and masses in the left breast may be determined pending biopsy results.\par *** ADDENDUM *** 12/30/2021\par FINAL DIAGNOSIS\par 1. BREAST, LEFT 3 N6 MASS, ULTRASOUND GUIDED NEEDLE CORE BIOPSIES:\par - RADIAL SCLEROSING LESION (RADIAL SCAR) ASSOCIATED WITH CALCIUM OXOLATE\par CRYSTALS.\par - BENIGN ATROPHIC BREAST TISSUE WITH PROLIFERATIVE TYPE FIBROCYSTIC\par CHANGES.\par Findings are benign high risk concordant.\par 2. BREAST, LEFT 8-9 N3 MASS, ULTRASOUND GUIDED NEEDLE CORE BIOPSIES:\par - SINGLE MINUTE FRAGMENT OF BENIGN ATROPHIC BREAST TISSUE WITH USUAL TYPE\par DUCT HYPERPLASIA AND DOMINANT STROMAL FIBROSIS (FIBROUS MASTOPATHY).\par Findings are benign concordant.\par 3. BREAST, RIGHT AXILLARY MASS, ULTRASOUND GUIDED NEEDLE CORE BIOPSIES:\par - FIBROADIPOSE CONNECTIVE TISSUE CONTAINING NODULAR FRAGMENTS OF\par MODERATELY DIFFERENTIATED ADENOCARCINOMA; MOST LIKELY REPRESENTATIVE\par OF METASTASIS (1/1) FROM THE PATIENT'S KNOWN MAMMARY PRIMARY CARCINOMA\par TO AN AXILLARY LYMPH NODE WITH MARCOS REPLACEMENT, OBLITERATION, AND EXTENSIVE EXTRACAPSULAR EXTENSION.\par \par 12/6/2022: Right breast modified radical mastectomy and left breast lumpectomy\par Pathology \par Final Diagnosis\par 1.  Breast, left anterior core biopsy site, radiofrequency seed localized\par lumpectomy:\par - Radial sclerosing lesion (radial scar) located adjacent to a healing\par prior biopsy site.\par - Benign atrophic breast tissue with fibrocystic changes, both\par proliferative and non-proliferative types, associated with numerous\par microcalcifications.\par - Focal mammary duct ectasia.\par 2.  Breast, left posterior core biopsy site, radiofrequency seed localized\par lumpectomy:\par - Radial sclerosing lesion (radial scar).\par - Benign atrophic breast tissue with fibrocystic changes, both\par proliferative and non-proliferative types, associated with\par microcalcifications.\par - Healing prior biopsy site.\par 3.  Breast, right, modified radical mastectomy:\par - Large fibrous tumor bed in the upper inner and upper outer quadrants\par with necrosis, chronic inflammation, coarse stromal calcifications, and\par a remote prior biopsy site along with abundant acellular mucin except\par for a 1.4 cm (microscopic measurements) focus of residual invasive well\par differentiated ductal carcinoma (colloid carcinoma).\par - No intraductal component nor lymphovascular invasion is seen.\par - Atrophic fatty breast tissue with a radial sclerosing lesion\par (radial scar), proliferative type fibrocystic changes associated with\par microcalcifications, and diffuse histologic changes consistent with prior\par systemic therapy effect.\par - Unremarkable nipple, skin, and deep fascial margin.  Negative for\par carcinoma.\par -Her 2 Negative\par - AJCC 8th Edition Pathologic Stage: ypT1c, ypN1a, ypMx, ypPR (pathologic\par partial response to systemic neoadjuvant chemotherapy).\par 4.  Breast, right axillary fat pad, excision:\par - Metastatic carcinoma present in three of twenty-eight axillary lymph\par nodes (3/28), the largest focus of which measures 3.5 mm (microscopic\par measurement).\par - A microscopic focus of extracapsular extension measuring 0.5 mm is\par identified.\par - Reactive sinus histiocytosis, dermatopathic lymphadenitis, and benign\par age-related hyaline deposition associated with calcifications.\par - Remote prior biopsy site changes.\par ___________________________________________________________________________\par % POSITIVE  STAINING INTENSITY\par ESTROGEN RECEPTOR                100 STRONG (3+)\par PROGESTERONE RECEPTOR    0 NONE\par Ki-67   2\par \par Right Breast modified mastectomy:\par Tumor size 14mm\par Number of Lymph Nodes with Macrometastases:    3\par Total Number of Lymph Nodes Examined (sentinel and non-sentinel): 28\par Tumor Focality:   Single focus of invasive carcinoma\par Ductal Carcinoma In Situ (DCIS):    Not identified\par Lobular Carcinoma In Situ (LCIS):    Not identified\par Lymphovascular Invasion:   Not identified

## 2023-01-13 NOTE — DISEASE MANAGEMENT
[Clinical] : TNM Stage: c [IIIA] : IIIA [FreeTextEntry4] : right breast IDC ER positive/ MN negative, Her2 Negative [TTNM] : 3 [NTNM] : 1 [MTNM] : x

## 2023-01-13 NOTE — PHYSICAL EXAM
[General Appearance - Well Developed] : well developed [General Appearance - Alert] : alert [General Appearance - In No Acute Distress] : in no acute distress [Heart Rate And Rhythm] : heart rate and rhythm were normal [Heart Sounds] : normal S1 and S2 [Right Breast Absent] : a total mastectomy [Oriented To Time, Place, And Person] : oriented to person, place, and time [Abdomen Soft] : soft [Nondistended] : nondistended [Normal] : no focal deficits [de-identified] : normal scars. no masses

## 2023-01-13 NOTE — DATA REVIEWED
[FreeTextEntry1] : I reviewed pertinent medical records, labs, pathology, radiology images/report, and correspondence

## 2023-01-13 NOTE — LETTER CLOSING
[Consult Closing:] : Thank you for allowing me to participate in the care of this patient.  If you have any questions, please do not hesitate to contact me. [Sincerely yours,] : Sincerely yours, [FreeTextEntry3] : Chris Miller M.D. \par \par Electronically proofread and signed by:  Chris Miller MD\par Attending, Department of Radiation Medicine\par [facility]

## 2023-01-19 ENCOUNTER — APPOINTMENT (OUTPATIENT)
Dept: SURGERY | Facility: CLINIC | Age: 73
End: 2023-01-19
Payer: MEDICARE

## 2023-01-19 VITALS
TEMPERATURE: 97.2 F | WEIGHT: 183 LBS | BODY MASS INDEX: 30.49 KG/M2 | SYSTOLIC BLOOD PRESSURE: 160 MMHG | OXYGEN SATURATION: 98 % | HEART RATE: 112 BPM | HEIGHT: 65 IN | DIASTOLIC BLOOD PRESSURE: 90 MMHG

## 2023-01-19 PROCEDURE — 99024 POSTOP FOLLOW-UP VISIT: CPT

## 2023-01-19 NOTE — PHYSICAL EXAM
[de-identified] :   Both surgical incisions are clean and dry, with no evidence of hematoma or infection.  No seroma noted beneath the right mastectomy flaps which are still somewhat indurated due to postoperative change.  No right upper extremity edema, range of motion satisfactory.

## 2023-01-19 NOTE — H&P PST ADULT - BP NONINVASIVE MEAN (MM HG)
[FreeTextEntry1] : Zi is a 15 year old male who presents today accompanied by mother for evaluation of right knee injury sustained 1 week ago.  He states he was playing soccer when he was hit in the right knee by a soccer ball.  It hit him on the medial side of the knee.  He feels like he felt a shift in the knee.  He experienced significant swelling to the knee for approximately 4 days following the injury.  He is having difficulty fully extending the knee.  He also has pain with flexion.  He is wearing a knee immobilizer which does help to alleviate some of his discomfort.  He denies any radiating pain, paresthesias or weakness in the leg.  He is here today for further orthopedic evaluation and management.
90

## 2023-01-19 NOTE — ASSESSMENT
[FreeTextEntry1] :   No postoperative problems noted, with pathology as outlined above.  She will return here in about 6 to 8 weeks for reexam or sooner as needed and continue her follow-up with the medical and radiation oncologists as scheduled.  Requisitions were provided for postmastectomy bras and a permanent right breast prosthesis.  All their questions were answered.

## 2023-01-19 NOTE — HISTORY OF PRESENT ILLNESS
[de-identified] : The patient returns with her  for continued postoperative follow-up after her recent right MRM and left breast excisional biopsy.  She looks and feels well and has no specific complaints.  She has good range of motion of the right upper extremity and no significant weakness.  She remains on letrozole per Dr. AKUA MARCOS, and she has had her radiation oncology evaluation with radiation therapy scheduled to begin next week.

## 2023-01-19 NOTE — DATA REVIEWED
[FreeTextEntry1] :   Pathology reviewed in detail, showing a 14 mm residual area of invasive cancer in the right breast, no LVI, 3 out of 28 positive axillary lymph nodes, and 2 areas of radial scar in the left breast.

## 2023-01-23 PROCEDURE — 77300 RADIATION THERAPY DOSE PLAN: CPT | Mod: 26

## 2023-01-23 PROCEDURE — 77295 3-D RADIOTHERAPY PLAN: CPT | Mod: 26

## 2023-01-23 PROCEDURE — 77334 RADIATION TREATMENT AID(S): CPT | Mod: 26

## 2023-01-23 PROCEDURE — 77427 RADIATION TX MANAGEMENT X5: CPT

## 2023-01-24 PROCEDURE — 77333 RADIATION TREATMENT AID(S): CPT | Mod: 26

## 2023-01-24 PROCEDURE — 77280 THER RAD SIMULAJ FIELD SMPL: CPT | Mod: 26

## 2023-01-25 ENCOUNTER — APPOINTMENT (OUTPATIENT)
Dept: HEMATOLOGY ONCOLOGY | Facility: CLINIC | Age: 73
End: 2023-01-25
Payer: MEDICARE

## 2023-01-25 ENCOUNTER — LABORATORY RESULT (OUTPATIENT)
Age: 73
End: 2023-01-25

## 2023-01-25 ENCOUNTER — APPOINTMENT (OUTPATIENT)
Dept: INFUSION THERAPY | Facility: CLINIC | Age: 73
End: 2023-01-25
Payer: MEDICARE

## 2023-01-25 ENCOUNTER — OUTPATIENT (OUTPATIENT)
Dept: OUTPATIENT SERVICES | Facility: HOSPITAL | Age: 73
LOS: 1 days | End: 2023-01-25

## 2023-01-25 VITALS
HEART RATE: 125 BPM | TEMPERATURE: 98.2 F | DIASTOLIC BLOOD PRESSURE: 99 MMHG | RESPIRATION RATE: 16 BRPM | SYSTOLIC BLOOD PRESSURE: 171 MMHG | WEIGHT: 186 LBS | BODY MASS INDEX: 30.99 KG/M2 | HEIGHT: 65 IN

## 2023-01-25 VITALS — SYSTOLIC BLOOD PRESSURE: 160 MMHG | DIASTOLIC BLOOD PRESSURE: 82 MMHG

## 2023-01-25 DIAGNOSIS — Z51.81 ENCOUNTER FOR THERAPEUTIC DRUG LEVEL MONITORING: ICD-10-CM

## 2023-01-25 DIAGNOSIS — C50.919 MALIGNANT NEOPLASM OF UNSPECIFIED SITE OF UNSPECIFIED FEMALE BREAST: ICD-10-CM

## 2023-01-25 DIAGNOSIS — C50.911 MALIGNANT NEOPLASM OF UNSPECIFIED SITE OF RIGHT FEMALE BREAST: ICD-10-CM

## 2023-01-25 DIAGNOSIS — C18.7 MALIGNANT NEOPLASM OF SIGMOID COLON: ICD-10-CM

## 2023-01-25 DIAGNOSIS — Z45.2 ENCOUNTER FOR ADJUSTMENT AND MANAGEMENT OF VASCULAR ACCESS DEVICE: ICD-10-CM

## 2023-01-25 DIAGNOSIS — K76.9 LIVER DISEASE, UNSPECIFIED: ICD-10-CM

## 2023-01-25 DIAGNOSIS — Z90.49 ACQUIRED ABSENCE OF OTHER SPECIFIED PARTS OF DIGESTIVE TRACT: Chronic | ICD-10-CM

## 2023-01-25 DIAGNOSIS — C50.211 MALIGNANT NEOPLASM OF UPPER-INNER QUADRANT OF RIGHT FEMALE BREAST: ICD-10-CM

## 2023-01-25 LAB
ALBUMIN SERPL ELPH-MCNC: 4.2 G/DL
ALP BLD-CCNC: 78 U/L
ALT SERPL-CCNC: 10 U/L
ANION GAP SERPL CALC-SCNC: 13 MMOL/L
AST SERPL-CCNC: 16 U/L
BASOPHILS # BLD AUTO: 0.03 K/UL
BASOPHILS # BLD AUTO: 0.07 K/UL
BASOPHILS NFR BLD AUTO: 0.6 %
BASOPHILS NFR BLD AUTO: 0.9 %
BILIRUB SERPL-MCNC: 0.5 MG/DL
BUN SERPL-MCNC: 17 MG/DL
CALCIUM SERPL-MCNC: 9.5 MG/DL
CANCER AG15-3 SERPL-ACNC: 21.6 U/ML
CHLORIDE SERPL-SCNC: 99 MMOL/L
CO2 SERPL-SCNC: 28 MMOL/L
CREAT SERPL-MCNC: 0.8 MG/DL
EGFR: 78 ML/MIN/1.73M2
EOSINOPHIL # BLD AUTO: 0.13 K/UL
EOSINOPHIL # BLD AUTO: 0.26 K/UL
EOSINOPHIL NFR BLD AUTO: 2.5 %
EOSINOPHIL NFR BLD AUTO: 3.2 %
GLUCOSE SERPL-MCNC: 90 MG/DL
HCT VFR BLD CALC: 40.3 %
HCT VFR BLD CALC: 42.7 %
HCT VFR BLD CALC: 45 %
HGB BLD-MCNC: 14 G/DL
HGB BLD-MCNC: 14.5 G/DL
HGB BLD-MCNC: 15 G/DL
IMM GRANULOCYTES NFR BLD AUTO: 0.2 %
IMM GRANULOCYTES NFR BLD AUTO: 0.5 %
LYMPHOCYTES # BLD AUTO: 0.62 K/UL
LYMPHOCYTES # BLD AUTO: 0.88 K/UL
LYMPHOCYTES NFR BLD AUTO: 10.9 %
LYMPHOCYTES NFR BLD AUTO: 11.8 %
MAN DIFF?: NORMAL
MAN DIFF?: NORMAL
MCHC RBC-ENTMCNC: 28.4 PG
MCHC RBC-ENTMCNC: 29.5 PG
MCHC RBC-ENTMCNC: 32.3 PG
MCHC RBC-ENTMCNC: 33.3 G/DL
MCHC RBC-ENTMCNC: 34 G/DL
MCHC RBC-ENTMCNC: 34.7 G/DL
MCV RBC AUTO: 85.2 FL
MCV RBC AUTO: 87 FL
MCV RBC AUTO: 92.9 FL
MONOCYTES # BLD AUTO: 0.34 K/UL
MONOCYTES # BLD AUTO: 0.51 K/UL
MONOCYTES NFR BLD AUTO: 6.3 %
MONOCYTES NFR BLD AUTO: 6.5 %
NEUTROPHILS # BLD AUTO: 4.14 K/UL
NEUTROPHILS # BLD AUTO: 6.3 K/UL
NEUTROPHILS NFR BLD AUTO: 78.2 %
NEUTROPHILS NFR BLD AUTO: 78.4 %
PLATELET # BLD AUTO: 194 K/UL
PLATELET # BLD AUTO: 214 K/UL
PLATELET # BLD AUTO: 219 K/UL
PMV BLD: 10.3 FL
POTASSIUM SERPL-SCNC: 3.7 MMOL/L
PROT SERPL-MCNC: 6.6 G/DL
RBC # BLD: 4.34 M/UL
RBC # BLD: 4.91 M/UL
RBC # BLD: 5.28 M/UL
RBC # FLD: 13.9 %
RBC # FLD: 14.2 %
RBC # FLD: 14.9 %
SODIUM SERPL-SCNC: 140 MMOL/L
WBC # FLD AUTO: 5.19 K/UL
WBC # FLD AUTO: 5.27 K/UL
WBC # FLD AUTO: 8.06 K/UL

## 2023-01-25 PROCEDURE — 77387C: CUSTOM

## 2023-01-25 PROCEDURE — 99214 OFFICE O/P EST MOD 30 MIN: CPT

## 2023-01-26 PROCEDURE — 77387C: CUSTOM

## 2023-01-26 PROCEDURE — 77427 RADIATION TX MANAGEMENT X5: CPT

## 2023-01-27 PROCEDURE — 77387C: CUSTOM

## 2023-01-28 NOTE — RESULTS/DATA
[FreeTextEntry1] : Pathology report from 22:\par Accession:                             [id number]\par \par Collected Date/Time:                   2022 15:48 EST\par Received Date/Time:                    2022 16:23 EST\par \par Addendum Report - Auth (Verified)\par \par Addendum\par 3) HER2 IHC Analysis was performed at [facility].\par Their report is attached below.\par ----------------------------------------------------------\par [facility]\par [address]\par [place], [place]  [address]\par [phone]\par \par INTEGRATED SPECIMEN#: [id number]\par INTEGRATED CASE#: [id number]\par DATE SIGNED OUT: 22 by Celina Pagan M.D. Ph.D\par HOSPITAL ID#: [id number]\par \par BODY SITE: Breast, right\par \par CLINICAL DATA:  Colloid carcinoma, S/P neoadjuvant chemotherapy\par \par MARKER               RESULTS            INTERPRETATION\par HER2 IHC                  0\par Negative\par \par Fixation Information :\par \par Fixative: Formalin\par Time of Fixation: <1 hour\par Duration of Fixation: >9 hour(s)\par Note:  Time to fixation = cold ischemic time\par \par HER2 IHC Results :\par Complete membrane stainin%\par Uniform staining: Absent\par Homogeneous, dark circumferential pattern: Absent\par Sample Adequate for analysis: Yes\par \par This interpretation or diagnosis is contingent on the specimen and\par the clinical information received. This analysis is an adjunct to the\par evaluation of the referring physician and does not represent a final\par diagnosis.\par \par This test was developed and its performance characteristics have been\par determined by [facility].  It has not been\par cleared or approved by the FDA.  The FDA has determined that such\par clearance or approval is not necessary. The laboratory is regulated under\par the Clinical Laboratory Improvement Amendment of 1988 (CLIA) as qualified\par to perform high complexity testing.\par \par [facility] is a business unit of [employer], a wholly-owned subsidiary of [employer].\par \par Complete report is available in the electronic medical record as a\par PATHOLOGY CONSULT REPORT\par \par Verified by: Fabien Elias M.D.\par (Electronic Signature)\par Reported on: 22 12:18 EST, [facility],\par [address], [place] [address]\par Phone: [phone]   Fax: [phone]\par _________________________________________________________________\par Surgical Pathology Report - Auth (Verified)\par \par Specimen(s) Submitted\par  E 1  Left breast core biopsy anterior\par Time obtained: 3:48 pm\par Cold ischemic time: <1 hour\par 2  Left breast core biopsy posterior\par Time obtained: 3:52 pm\par Cold ischemic time: <1 hour\par 3  Right breast\par Time obtained: 5:33 pm\par Cold ischemic time: <1 hour\par 4  Right axillary fat pad\par Time obtained: 5:38 pm\par Cold ischemic time: <1 hour\par \par \par Final Diagnosis\par 1.  Breast, left anterior core biopsy site, radiofrequency seed localized\par lumpectomy:\par - Radial sclerosing lesion (radial scar) located adjacent to a healing\par prior biopsy site.\par - Benign atrophic breast tissue with fibrocystic changes, both\par proliferative and non-proliferative types, associated with numerous\par microcalcifications.\par - Focal mammary duct ectasia.\par \par 2.  Breast, left posterior core biopsy site, radiofrequency seed localized\par lumpectomy:\par - Radial sclerosing lesion (radial scar).\par - Benign atrophic breast tissue with fibrocystic changes, both\par proliferative and non-proliferative types, associated with\par microcalcifications.\par - Healing prior biopsy site.\par \par 3.  Breast, right, modified radical mastectomy:\par - Large fibrous tumor bed in the upper inner and upper outer quadrants\par with necrosis, chronic inflammation, coarse stromal calcifications, and\par a remote prior biopsy site along with abundant acellular mucin except\par for a 1.4 cm (microscopic measurements) focus of residual invasive well\par differentiated ductal carcinoma (colloid carcinoma).\par - No intraductal component nor lymphovascular invasion is seen.\par - Atrophic fatty breast tissue with a radial sclerosing lesion\par (radial scar), proliferative type fibrocystic changes associated with\par microcalcifications, and diffuse histologic changes consistent with prior\par systemic therapy effect.\par - Unremarkable nipple, skin, and deep fascial margin.  Negative for\par carcinoma.\par - Pending special studies for Her2/SIRI oncoprotein expression and/or gene\par amplification, with the results to follow and be reported as a separate\par addendum.\par

## 2023-01-28 NOTE — PHYSICAL EXAM
[Fully active, able to carry on all pre-disease performance without restriction] : Status 0 - Fully active, able to carry on all pre-disease performance without restriction [Normal] : affect appropriate [de-identified] : right breast mastectomy incision well healed  No palpable right axillary lymph node. left breast incision well healed

## 2023-01-28 NOTE — ASSESSMENT
[FreeTextEntry1] : 70 yo female has synchronous colon cancer and breast cancer:\par - Invasive moderately differentiated adenocarcinoma of the sigmoid colon, MMR proficient, s/p robotic sigmoidectomy and colostomy. 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 Clinical stage is cT3N1.\par \par Assessment and Plan:\par #  Invasive well to moderately differentiated ductal carcinoma of the right breast, ER positive, KS and Her-2 negative, clinical stage cT3N1.\par -- Completed  AC x 4 and weekly Taxol x 12. \par -- S/P  right MRM and left breast lumpectomy on 12/8/22. The right mastectomy showed 1.4 cm residual invasive well differentiated ductal carcinoma (colloid carcinoma), %, KS neg, KI 67 2% and Her-2 negative. There are metastatic carcinoma present in three of twenty-eight axillary lymph nodes (3/28), the largest focus of which measures 3.5 mm, +JESSICA.  AJCC 8th Edition Pathologic Stage: ypT1c, ypN1a, ypMx, ypPR (pathologic partial response to systemic neoadjuvant chemotherapy). \par -- Followup with Dr. Miller for PMRT.\par -- Continue Letrozole daily. \par -- Restaging CT C/A/P showed Unchanged bilateral few pulmonary nodules measuring up to 4 mm. Interval decrease in size of previously enlarged right axillary lymph node. Indeterminate hyperenhancing 1.5 cm right hepatic lobe lesion. MRI abdomen showed Hyperenhancing right hepatic lobe lesion, demonstrating restricted diffusion, indeterminate. Metastasis is on differential. On 10/13/22, CT guided bx of the right liver lesion is Negative for malignancy. The pathology showed focally distorted hepatic parenchyma. Differential diagnosis include nodular regenerative hyperplasia, focal nodular hyperplasia and hepatocellular adenoma. Will followup with imaging study in 3 months.\par -- Will need to continue screening mammo of the left breast annually. \par -- Bone density was normal. Advised to continue calcium and vitamin D and  regular exercise. \par -- Port flush today /monthly. \par \par # IIA ([id number]) adenocarcinoma of the sigmoid colon, MMR proficient, s/p robotic sigmoidectomy. \par -- Follow up with GI for surveillance colonoscopy (Dr Fritz). She will follow up GI after completion of breast radiation therapy.\par \par -- RTC  2 months \par

## 2023-01-28 NOTE — HISTORY OF PRESENT ILLNESS
[de-identified] : 70 yo female was referred by Dr. Quinonez for consultation of breast cancer. Jesica initially presented to hospital for constipation and abdominal pain. CT a/p on 11/24/21 showed  large bowel obstruction secondary to a likely malignant stricture in the proximal sigmoid colon, small volume ascites and lobulated, partially visualized right breast mass and left breast nodule. Physical exam correlation and correlation with dedicated breast imaging was recommended as malignancy is of concern.\par \par On 11/26/21, CT chest showed right and left breast masses. The right breast mass measured 6.5 x 3.8 x 7.0 cm and contained small calcifications. The left breast mass measured 3.1 x 2.9 x 2.6 cm.\par \par On 11/29/21, colonoscopy revealed sigmoid colon mass and and biopsy showed invasive adenocarcinoma, moderately differentiated. MMR proteins were all expressed. \par \par On 11/26/21, right breast mass core biopsy showed Invasive well to moderately differentiated ductal carcinoma with dominant mucinous features (colloid carcinoma), ER pos %, CT negative, Ki 67 3% and her-2 negative (2+ by IHC and FISH ration 1.4). \par \par On 12/21/21, b/l breast dx mammo and US were performed which showed 10 cm mass at the 11 to 1:00 position 3 to 4 cm from the nipple corresponding to the biopsy-proven right breast carcinoma.  In the right axilla, at the 11:00 position 13 cm from the nipple, there is an abnormal lymph node measuring 2.3 x 1.3 x 0.9 cm. Ultrasound-guided biopsy is recommended. In the left breast, there are scattered similar appearing subcentimeter circumscribed masses seen throughout the left breast. These correspond to mammographically demonstrated masses:\par At the 3:00 position 6 cm from the nipple, there is a mass measuring 0.4 x 0.3 x 0.3 cm. Ultrasound-guided biopsy of this mass is recommended.\par At the 7 to 8:00 position 5 cm from the nipple, there is a mass measuring 0.4 x 0.3 x 0.2 cm.\par At the 8 to 9:00 position 4 cm from the nipple, there is a circumscribed mass measuring 0.4 x 0.4 x 0.3 cm.\par At the 8 to 9:00 position 3 cm from nipple, corresponding to area palpable concern and mammographic findings, there is a heterogeneous mass measuring 2.5 x 2.3 x 2.0 cm. Ultrasound-guided biopsy is recommended.\par \par On 12/30/21, left breast 3 N6 mass, ultrasound guided needle core biopsies revealed radial sclerosing lesion (radial scar) and Benign atrophic breast tissue with proliferative type fibrocystic changes. Left breast 8-9 N3 mass, ultrasound guided needle core biopsies showed single minute fragment of benign atrophic breast tissue.  \par The right axillary mass, ultrasound guided needle core biopsies were positive for moderately differentiated adenocarcinoma, most likely representative of metastasis from the patient's known mammary primary carcinoma to an axillary lymph node with mervat replacement, obliteration, and extensive extracapsular extension.\par \par On 1/19/22, left breast relatively posterior calcifications, stereotactic guided vacuum assisted needle core biopsies showed radial sclerosing lesion (radial scar) and benign atrophic breast tissue with proliferative type fibrocystic changes. Left relatively anterior calcifications, stereotactic guided vacuum assisted needle core biopsies showed benign atrophic breast tissue with proliferative type fibrocystic changes.  \par \par On 1/5/22, she had b/l breast MRI which showed 8 cm biopsy-proven right breast index carcinoma and axillary mervat metastasis. Biopsy-proven left breast radial scar with no additional sites of suspicious enhancement.\par \par On 2/17/22, she underwent robotic sigmoidectomy and colostomy. The pathology revealed Invasive moderately differentiated adenocarcinoma (6 cm in greatest dimension), invading through muscularis propria into pericolorectal tissue (pT3), +LVI.  All margins were negative for invasive carcinoma.  Twenty two lymph nodes were negative for metastatic carcinoma. She recovered well from surgery. \par \par She is here today with her  to discuss breast cancer treatment. She has no family h/o breast cancer or any other kinds of cancer.  [de-identified] : 4/21/22:susanne Mooney is here today for followup and chemotherapy. She has synchronous colon cancer and breast cancer: invasive moderately differentiated adenocarcinoma of the sigmoid colon, MMR proficient, s/p robotic sigmoidectomy and colostomy. 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 Clinical stage is cT3N1. Her staging CT and bone scan did not show evidence of distant mets. She is here today to start neoadjuvant chemotherapy for breast cancer. \par \par 5/9/22:susanne Mooney is here today for followup and chemotherapy. She has synchronous colon cancer and breast cancer: invasive moderately differentiated adenocarcinoma of the sigmoid colon, MMR proficient, s/p robotic sigmoidectomy and colostomy. 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 Clinical stage is cT3N1. Her staging CT and bone scan did not show evidence of distant mets. She started on neoadjuvant chemotherapy for breast cancer. She had 1st cycle AC 2 weeks ago and tolerated well. \par \par 5/23/22susanne Mooney is here today for followup and chemotherapy. She has synchronous colon cancer and breast cancer: invasive moderately differentiated adenocarcinoma of the sigmoid colon, MMR proficient, s/p robotic sigmoidectomy and colostomy. 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 Clinical stage is cT3N1. Her staging CT and bone scan did not show evidence of distant mets. She started on neoadjuvant chemotherapy. To date, she has had 2 cycles and tolerated well. She did not have fever, mouth sore, vomiting or diarrhea. \par \par 6/6/22susanne Mooney is here today for followup and chemotherapy. She has synchronous colon cancer and breast cancer: invasive moderately differentiated adenocarcinoma of the sigmoid colon, MMR proficient, s/p robotic sigmoidectomy and colostomy. 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 Clinical stage is cT3N1. Her staging CT and bone scan did not show evidence of distant mets. She has been on neoadjuvant chemotherapy with dose dense AC. To date, she has had 3 cycles and tolerated well. She did not have fever, mouth sore, vomiting or diarrhea. She does endorse some burning/tearing sensation in the eye. \par \par 6/20/22:susanne Mooney is here today for followup and chemotherapy. She has synchronous colon cancer and breast cancer: invasive moderately differentiated adenocarcinoma of the sigmoid colon, MMR proficient, s/p robotic sigmoidectomy and colostomy. The pathologic stage is IIA (iX4P1N7), and invasive well to moderately differentiated ductal carcinoma of the right breast with dominant mucinous features (colloid carcinoma), ER pos %, MO negative, Her-2 negative. Clinical stage is cT3N1. Her staging CT and bone scan did not show evidence of distant mets. She has been on neoadjuvant chemotherapy. She completed dose dense AC x 4. She feels more fatigue. Her eye burning is getting better.\par \par 7/11/22susanne Mooney is here for follow up and chemotherapy.  She has synchronous colon cancer and breast cancer: invasive moderately differentiated adenocarcinoma of the sigmoid colon, MMR proficient, s/p robotic sigmoidectomy and colostomy. 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 Clinical stage is cT3N1. Her staging CT and bone scan did not show evidence of distant mets. She has been on neoadjuvant chemotherapy. She completed dose dense AC x 4. She completed 3 doses of weekly Taxol so far. She feels well. Her energy level is better and burning of the eyes is resolved. She followed up with Colorectal sx and they want to wait until Taxol is finished for her chemotherapy.\par \par 09/7/22susanne Mooney is here for follow up and chemotherapy.  She has synchronous colon cancer and breast cancer: invasive moderately differentiated adenocarcinoma of the sigmoid colon, MMR proficient, s/p robotic sigmoidectomy and colostomy. The pathologic stage is IIA ([id number]), and invasive well to moderately differentiated ductal carcinoma of the right breast with dominant mucinous features (colloid carcinoma), ER pos %, MO negative, Her-2 negative. Clinical stage is cT3N1. Her staging CT and bone scan did not show evidence of distant mets. She has been on neoadjuvant chemotherapy. She completed dose dense AC x 4. Currently, she is on weekly Taxol. To date, she has had 11 treatments. She tolerated chemotherapy well. \par \par 10/5/22\svitlana Mooney is here for follow up. She has synchronous colon cancer and breast cancer: invasive moderately differentiated adenocarcinoma of the sigmoid colon, MMR proficient, s/p robotic sigmoidectomy and colostomy. The pathologic stage is IIA ([id number]), and invasive well to moderately differentiated ductal carcinoma of the right breast with dominant mucinous features (colloid carcinoma), ER pos %, MO negative, Her-2 negative. Clinical stage is cT3N1. Her staging CT and bone scan did not show evidence of distant mets. She completed neoadjuvant chemotherapy with dose dense AC x 4 and weekly Taxol x 12. She started letrozole on 9/15 and tolerating well. She had breast MRI 9/22 showed biopsy-proven malignant right breast mass with associated skin thickening. Interval reduction in size of prior noted prominent right axillary lymph node likely treatment related. No MRI evidence of malignancy in the left breast. She also had restaging CT C/A/P which showed Unchanged bilateral few pulmonary nodules measuring up to 4 mm. Interval decrease in size of previously enlarged right axillary lymph node. Indeterminate hyperenhancing 1.5 cm right hepatic lobe lesion. MRI abdomen on 9/25/22 showed Hyperenhancing right hepatic lobe lesion, demonstrating restricted diffusion, indeterminate. Metastasis is on differential. She is pending biopsy of liver lesion. \par \par 11/2/22:susanne Mooney is here for follow up. She has synchronous colon cancer and breast cancer: invasive moderately differentiated adenocarcinoma of the sigmoid colon, MMR proficient, s/p robotic sigmoidectomy and colostomy. The pathologic stage is IIA ([id number]), and invasive well to moderately differentiated ductal carcinoma of the right breast with dominant mucinous features (colloid carcinoma), ER pos %, MO negative, Her-2 negative. Clinical stage is cT3N1. She completed neoadjuvant chemotherapy with dose dense AC x 4 and weekly Taxol x 12. She started letrozole on 9/15 and tolerating well.  She had restaging CT C/A/P which showed Unchanged bilateral few pulmonary nodules measuring up to 4 mm. Interval decrease in size of previously enlarged right axillary lymph node. Indeterminate hyperenhancing 1.5 cm right hepatic lobe lesion. MRI abdomen on 9/25/22 showed Hyperenhancing right hepatic lobe lesion, demonstrating restricted diffusion, indeterminate. On 10/13/22, she had CT guided bx of the right liver lesion which is Negative for malignancy. The pathology showed focally distorted hepatic parenchyma. Differential diagnosis include nodular regenerative hyperplasia, focal nodular hyperplasia and hepatocellular adenoma. \par \par \par 12/28/22:\par Jesica is here for follow up. She has synchronous colon cancer and breast cancer: invasive moderately differentiated adenocarcinoma of the sigmoid colon, MMR proficient, s/p robotic sigmoidectomy and colostomy. \par  Patient reports feeling well, Right breast healing very well, no discharges.  \par On 12/8/22 S/P Right Modified radical mastectomy. left breast lumpectomy \par Post surgical Pathology report :\par Final Diagnosis\par 1.  Breast, left anterior core biopsy site, radiofrequency seed localized\par lumpectomy:\par - Radial sclerosing lesion (radial scar) located adjacent to a healing\par prior biopsy site.\par - Benign atrophic breast tissue with fibrocystic changes, both\par proliferative and non-proliferative types, associated with numerous\par microcalcifications.\par \par 2.  Breast, left posterior core biopsy site, radiofrequency seed localized\par lumpectomy:\par - Radial sclerosing lesion (radial scar).\par - Benign atrophic breast tissue with fibrocystic changes, both\par proliferative and non-proliferative types, associated with\par microcalcifications.\par - Healing prior biopsy site.\par \par 01/25/2023:\par Jesica is here for follow up. She has synchronous colon cancer and breast cancer, stage is IIA ([id number]) invasive moderately differentiated adenocarcinoma of the sigmoid colon, MMR proficient, s/p robotic sigmoidectomy and colostomy, and invasive well to moderately differentiated ductal carcinoma of the right breast ER/MO positive and Her-2 negative, Clinical stage is cT3N1, s/p neoadjuvant chemo with AC-T followed right MRM and left breast lumpectomy on 12/8/22. The right mastectomy showed 1.4 cm residual invasive well differentiated ductal carcinoma (colloid carcinoma), %, MO neg, KI 67 2% and Her-2 negative. There are metastatic carcinoma present in three of twenty-eight axillary lymph nodes (3/28), the largest focus of which measures 3.5 mm, +JESSICA.  AJCC 8th Edition Pathologic Stage: ypT1c, ypN1a, ypMx, ypPR (pathologic partial response to systemic neoadjuvant chemotherapy). She has been taking Letrozole daily and tolerated well. She saw Dr. Miller and started on PMRT. \par \par

## 2023-01-30 PROCEDURE — 77387C: CUSTOM

## 2023-01-31 ENCOUNTER — NON-APPOINTMENT (OUTPATIENT)
Age: 73
End: 2023-01-31

## 2023-01-31 VITALS
TEMPERATURE: 98.1 F | BODY MASS INDEX: 30.95 KG/M2 | WEIGHT: 186 LBS | RESPIRATION RATE: 16 BRPM | HEART RATE: 101 BPM | OXYGEN SATURATION: 98 %

## 2023-01-31 VITALS — DIASTOLIC BLOOD PRESSURE: 90 MMHG | SYSTOLIC BLOOD PRESSURE: 192 MMHG

## 2023-01-31 NOTE — HISTORY OF PRESENT ILLNESS
[FreeTextEntry1] : 1/31/2023 OTV 1000cGy/5000cGy to the right chest wall: pt feeling well. Denies any fatigue or any skin issues. Using eucerin 2 x day. BP elevated today on today's visit. She took her BP meds this am. denies any symptoms. She has appointment with cardiologist this Thursday but states each time she goes to her cardiologist it is never this elevated. She is followed by  and Dr Fritz as well.  Will be due for surveillance colonoscopy after her radiation (h/o adenocarcinoma of the sigmoid colon).

## 2023-01-31 NOTE — DISEASE MANAGEMENT
[Clinical] : TNM Stage: c [IIIA] : IIIA [FreeTextEntry4] : right breast IDC ER positive/ OK negative, Her2 Negative [TTNM] : 3 [NTNM] : 1 [MTNM] : x [de-identified] : 1000cGy [de-identified] : 5000cGy [de-identified] : right chest wall

## 2023-01-31 NOTE — REVIEW OF SYSTEMS
[Fatigue: Grade 0] : Fatigue: Grade 0 [Breast Pain: Grade 0] : Breast Pain: Grade 0 [Dermatitis Radiation: Grade 0] : Dermatitis Radiation: Grade 0

## 2023-01-31 NOTE — PHYSICAL EXAM
[General Appearance - Well Developed] : well developed [General Appearance - Alert] : alert [General Appearance - In No Acute Distress] : in no acute distress [] : no respiratory distress [___] : a [unfilled] ~Ucm mastectomy scar [Oriented To Time, Place, And Person] : oriented to person, place, and time [de-identified] : no erythema or any breakdown noted [de-identified] : no erythema or any breakdown noted

## 2023-02-01 PROCEDURE — 77427 RADIATION TX MANAGEMENT X5: CPT

## 2023-02-01 PROCEDURE — 77387C: CUSTOM

## 2023-02-02 ENCOUNTER — APPOINTMENT (OUTPATIENT)
Dept: CARDIOLOGY | Facility: CLINIC | Age: 73
End: 2023-02-02
Payer: MEDICARE

## 2023-02-02 VITALS — RESPIRATION RATE: 18 BRPM | SYSTOLIC BLOOD PRESSURE: 140 MMHG | DIASTOLIC BLOOD PRESSURE: 80 MMHG

## 2023-02-02 VITALS — HEIGHT: 65 IN | WEIGHT: 186 LBS | HEART RATE: 98 BPM | BODY MASS INDEX: 30.99 KG/M2

## 2023-02-02 PROCEDURE — 77387C: CUSTOM

## 2023-02-02 PROCEDURE — 93000 ELECTROCARDIOGRAM COMPLETE: CPT

## 2023-02-02 PROCEDURE — 77427 RADIATION TX MANAGEMENT X5: CPT

## 2023-02-02 PROCEDURE — 99214 OFFICE O/P EST MOD 30 MIN: CPT

## 2023-02-02 NOTE — PHYSICAL EXAM

## 2023-02-03 PROCEDURE — 77387C: CUSTOM

## 2023-02-05 ENCOUNTER — OUTPATIENT (OUTPATIENT)
Dept: OUTPATIENT SERVICES | Facility: HOSPITAL | Age: 73
LOS: 1 days | End: 2023-02-05
Payer: MEDICARE

## 2023-02-05 DIAGNOSIS — Z90.49 ACQUIRED ABSENCE OF OTHER SPECIFIED PARTS OF DIGESTIVE TRACT: Chronic | ICD-10-CM

## 2023-02-05 DIAGNOSIS — Z00.8 ENCOUNTER FOR OTHER GENERAL EXAMINATION: ICD-10-CM

## 2023-02-06 ENCOUNTER — OUTPATIENT (OUTPATIENT)
Dept: OUTPATIENT SERVICES | Facility: HOSPITAL | Age: 73
LOS: 1 days | End: 2023-02-06
Payer: MEDICARE

## 2023-02-06 DIAGNOSIS — Z00.8 ENCOUNTER FOR OTHER GENERAL EXAMINATION: ICD-10-CM

## 2023-02-06 DIAGNOSIS — Z90.49 ACQUIRED ABSENCE OF OTHER SPECIFIED PARTS OF DIGESTIVE TRACT: Chronic | ICD-10-CM

## 2023-02-06 PROCEDURE — 77387 GUIDANCE FOR RADJ TX DLVR: CPT

## 2023-02-06 PROCEDURE — 77387C: CUSTOM

## 2023-02-06 PROCEDURE — 77412 RADIATION TX DELIVERY LVL 3: CPT

## 2023-02-07 ENCOUNTER — OUTPATIENT (OUTPATIENT)
Dept: OUTPATIENT SERVICES | Facility: HOSPITAL | Age: 73
LOS: 1 days | End: 2023-02-07
Payer: MEDICARE

## 2023-02-07 ENCOUNTER — NON-APPOINTMENT (OUTPATIENT)
Age: 73
End: 2023-02-07

## 2023-02-07 VITALS
DIASTOLIC BLOOD PRESSURE: 80 MMHG | SYSTOLIC BLOOD PRESSURE: 160 MMHG | OXYGEN SATURATION: 95 % | HEART RATE: 85 BPM | TEMPERATURE: 96.1 F | BODY MASS INDEX: 31.18 KG/M2 | WEIGHT: 187.38 LBS | RESPIRATION RATE: 16 BRPM

## 2023-02-07 DIAGNOSIS — C50.911 MALIGNANT NEOPLASM OF UNSPECIFIED SITE OF RIGHT FEMALE BREAST: ICD-10-CM

## 2023-02-07 DIAGNOSIS — Z90.49 ACQUIRED ABSENCE OF OTHER SPECIFIED PARTS OF DIGESTIVE TRACT: Chronic | ICD-10-CM

## 2023-02-07 DIAGNOSIS — Z00.8 ENCOUNTER FOR OTHER GENERAL EXAMINATION: ICD-10-CM

## 2023-02-08 ENCOUNTER — OUTPATIENT (OUTPATIENT)
Dept: OUTPATIENT SERVICES | Facility: HOSPITAL | Age: 73
LOS: 1 days | End: 2023-02-08

## 2023-02-08 DIAGNOSIS — C50.911 MALIGNANT NEOPLASM OF UNSPECIFIED SITE OF RIGHT FEMALE BREAST: ICD-10-CM

## 2023-02-08 DIAGNOSIS — Z00.8 ENCOUNTER FOR OTHER GENERAL EXAMINATION: ICD-10-CM

## 2023-02-08 DIAGNOSIS — Z90.49 ACQUIRED ABSENCE OF OTHER SPECIFIED PARTS OF DIGESTIVE TRACT: Chronic | ICD-10-CM

## 2023-02-08 PROCEDURE — 77427 RADIATION TX MANAGEMENT X5: CPT

## 2023-02-08 PROCEDURE — 77387C: CUSTOM

## 2023-02-09 ENCOUNTER — OUTPATIENT (OUTPATIENT)
Dept: OUTPATIENT SERVICES | Facility: HOSPITAL | Age: 73
LOS: 1 days | End: 2023-02-09
Payer: MEDICARE

## 2023-02-09 DIAGNOSIS — Z00.8 ENCOUNTER FOR OTHER GENERAL EXAMINATION: ICD-10-CM

## 2023-02-09 DIAGNOSIS — C50.911 MALIGNANT NEOPLASM OF UNSPECIFIED SITE OF RIGHT FEMALE BREAST: ICD-10-CM

## 2023-02-09 DIAGNOSIS — Z90.49 ACQUIRED ABSENCE OF OTHER SPECIFIED PARTS OF DIGESTIVE TRACT: Chronic | ICD-10-CM

## 2023-02-09 PROCEDURE — 77387C: CUSTOM

## 2023-02-10 ENCOUNTER — OUTPATIENT (OUTPATIENT)
Dept: OUTPATIENT SERVICES | Facility: HOSPITAL | Age: 73
LOS: 1 days | End: 2023-02-10
Payer: MEDICARE

## 2023-02-10 DIAGNOSIS — Z90.49 ACQUIRED ABSENCE OF OTHER SPECIFIED PARTS OF DIGESTIVE TRACT: Chronic | ICD-10-CM

## 2023-02-10 PROCEDURE — 77387C: CUSTOM

## 2023-02-12 NOTE — HISTORY OF PRESENT ILLNESS
[FreeTextEntry1] : 2/7/2023 OTV 2000cGy/5000cGy to the right chest wall: she is doing well\par \par 1/31/2023 OTV 1000cGy/5000cGy to the right chest wall: pt feeling well. Denies any fatigue or any skin issues. Using eucerin 2 x day. BP elevated today on today's visit. She took her BP meds this am. denies any symptoms. She has appointment with cardiologist this Thursday but states each time she goes to her cardiologist it is never this elevated. She is followed by  and Dr Fritz as well.  Will be due for surveillance colonoscopy after her radiation (h/o adenocarcinoma of the sigmoid colon).

## 2023-02-12 NOTE — REVIEW OF SYSTEMS
[Fatigue: Grade 0] : Fatigue: Grade 0 [Dermatitis Radiation: Grade 0] : Dermatitis Radiation: Grade 0

## 2023-02-13 ENCOUNTER — OUTPATIENT (OUTPATIENT)
Dept: OUTPATIENT SERVICES | Facility: HOSPITAL | Age: 73
LOS: 1 days | End: 2023-02-13
Payer: MEDICARE

## 2023-02-13 DIAGNOSIS — Z90.49 ACQUIRED ABSENCE OF OTHER SPECIFIED PARTS OF DIGESTIVE TRACT: Chronic | ICD-10-CM

## 2023-02-13 DIAGNOSIS — Z00.8 ENCOUNTER FOR OTHER GENERAL EXAMINATION: ICD-10-CM

## 2023-02-13 DIAGNOSIS — C50.911 MALIGNANT NEOPLASM OF UNSPECIFIED SITE OF RIGHT FEMALE BREAST: ICD-10-CM

## 2023-02-13 PROCEDURE — 77387C: CUSTOM

## 2023-02-14 ENCOUNTER — NON-APPOINTMENT (OUTPATIENT)
Age: 73
End: 2023-02-14

## 2023-02-14 ENCOUNTER — OUTPATIENT (OUTPATIENT)
Dept: OUTPATIENT SERVICES | Facility: HOSPITAL | Age: 73
LOS: 1 days | End: 2023-02-14

## 2023-02-14 VITALS
OXYGEN SATURATION: 97 % | DIASTOLIC BLOOD PRESSURE: 84 MMHG | TEMPERATURE: 97.2 F | HEART RATE: 107 BPM | WEIGHT: 187.6 LBS | SYSTOLIC BLOOD PRESSURE: 172 MMHG | BODY MASS INDEX: 31.22 KG/M2 | RESPIRATION RATE: 16 BRPM

## 2023-02-14 DIAGNOSIS — Z90.49 ACQUIRED ABSENCE OF OTHER SPECIFIED PARTS OF DIGESTIVE TRACT: Chronic | ICD-10-CM

## 2023-02-14 DIAGNOSIS — C50.911 MALIGNANT NEOPLASM OF UNSPECIFIED SITE OF RIGHT FEMALE BREAST: ICD-10-CM

## 2023-02-14 DIAGNOSIS — Z00.8 ENCOUNTER FOR OTHER GENERAL EXAMINATION: ICD-10-CM

## 2023-02-14 NOTE — DISEASE MANAGEMENT
[Clinical] : TNM Stage: c [IIIA] : IIIA [FreeTextEntry4] : right breast IDC ER positive/ ND negative, Her2 Negative [TTNM] : 3 [NTNM] : 1 [MTNM] : x [de-identified] : 3000cGy [de-identified] : 5000cGy [de-identified] : right chest wall

## 2023-02-14 NOTE — HISTORY OF PRESENT ILLNESS
[FreeTextEntry1] : 2/14/2023 OTV 3000cGy/5000cGy to the right chest wall: patient doing well.Denies any fatigue. Denies pain or any skin breakdown to right chest area. She uses aguaphor 2-3 times a day.\par \par 2/7/2023 OTV 2000cGy/5000cGy to the right chest wall: she is doing well. denies any pain. \par \par 1/31/2023 OTV 1000cGy/5000cGy to the right chest wall: pt feeling well. Denies any fatigue or any skin issues. Using eucerin 2 x day. BP elevated today on today's visit. She took her BP meds this am. denies any symptoms. She has appointment with cardiologist this Thursday but states each time she goes to her cardiologist it is never this elevated. She is followed by  and Dr Fritz as well.  Will be due for surveillance colonoscopy after her radiation (h/o adenocarcinoma of the sigmoid colon).

## 2023-02-14 NOTE — PHYSICAL EXAM
[General Appearance - Well Developed] : well developed [General Appearance - Alert] : alert [General Appearance - In No Acute Distress] : in no acute distress [] : no respiratory distress [Oriented To Time, Place, And Person] : oriented to person, place, and time [de-identified] : slight erythema to right chest wall/ no breakdown noted [de-identified] : slight erythema to right chest wall/ no breakdown noted

## 2023-02-15 ENCOUNTER — OUTPATIENT (OUTPATIENT)
Dept: OUTPATIENT SERVICES | Facility: HOSPITAL | Age: 73
LOS: 1 days | End: 2023-02-15

## 2023-02-15 DIAGNOSIS — C50.911 MALIGNANT NEOPLASM OF UNSPECIFIED SITE OF RIGHT FEMALE BREAST: ICD-10-CM

## 2023-02-15 DIAGNOSIS — Z90.49 ACQUIRED ABSENCE OF OTHER SPECIFIED PARTS OF DIGESTIVE TRACT: Chronic | ICD-10-CM

## 2023-02-15 PROCEDURE — 77427 RADIATION TX MANAGEMENT X5: CPT

## 2023-02-15 PROCEDURE — 77387C: CUSTOM

## 2023-02-16 ENCOUNTER — OUTPATIENT (OUTPATIENT)
Dept: OUTPATIENT SERVICES | Facility: HOSPITAL | Age: 73
LOS: 1 days | End: 2023-02-16

## 2023-02-16 DIAGNOSIS — C50.911 MALIGNANT NEOPLASM OF UNSPECIFIED SITE OF RIGHT FEMALE BREAST: ICD-10-CM

## 2023-02-16 DIAGNOSIS — Z00.8 ENCOUNTER FOR OTHER GENERAL EXAMINATION: ICD-10-CM

## 2023-02-16 DIAGNOSIS — Z90.49 ACQUIRED ABSENCE OF OTHER SPECIFIED PARTS OF DIGESTIVE TRACT: Chronic | ICD-10-CM

## 2023-02-16 PROCEDURE — 77387C: CUSTOM

## 2023-02-17 ENCOUNTER — OUTPATIENT (OUTPATIENT)
Dept: OUTPATIENT SERVICES | Facility: HOSPITAL | Age: 73
LOS: 1 days | End: 2023-02-17

## 2023-02-17 DIAGNOSIS — Z00.8 ENCOUNTER FOR OTHER GENERAL EXAMINATION: ICD-10-CM

## 2023-02-17 DIAGNOSIS — Z90.49 ACQUIRED ABSENCE OF OTHER SPECIFIED PARTS OF DIGESTIVE TRACT: Chronic | ICD-10-CM

## 2023-02-17 DIAGNOSIS — C50.911 MALIGNANT NEOPLASM OF UNSPECIFIED SITE OF RIGHT FEMALE BREAST: ICD-10-CM

## 2023-02-17 PROCEDURE — 77387C: CUSTOM

## 2023-02-21 ENCOUNTER — OUTPATIENT (OUTPATIENT)
Dept: OUTPATIENT SERVICES | Facility: HOSPITAL | Age: 73
LOS: 1 days | End: 2023-02-21

## 2023-02-21 ENCOUNTER — NON-APPOINTMENT (OUTPATIENT)
Age: 73
End: 2023-02-21

## 2023-02-21 VITALS
BODY MASS INDEX: 31.33 KG/M2 | OXYGEN SATURATION: 96 % | DIASTOLIC BLOOD PRESSURE: 92 MMHG | RESPIRATION RATE: 16 BRPM | TEMPERATURE: 97.7 F | HEART RATE: 104 BPM | WEIGHT: 188.25 LBS | SYSTOLIC BLOOD PRESSURE: 180 MMHG

## 2023-02-21 DIAGNOSIS — Z90.49 ACQUIRED ABSENCE OF OTHER SPECIFIED PARTS OF DIGESTIVE TRACT: Chronic | ICD-10-CM

## 2023-02-21 PROCEDURE — 77387C: CUSTOM

## 2023-02-21 NOTE — HISTORY OF PRESENT ILLNESS
[FreeTextEntry1] : 2/21/2023 OTV 3800cGy/5000cGy to the right chest wall: Patient doing well. Denies any pain or skin breakdown. Moisturizing daily 2 -3 times. Denies fatigue. \par \par 2/14/2023 OTV 3000cGy/5000cGy to the right chest wall: patient doing well.Denies any fatigue. Denies pain or any skin breakdown to right chest area. She uses aquaphor 2-3 times a day.\par \par 2/7/2023 OTV 2000cGy/5000cGy to the right chest wall: she is doing well. denies any pain. \par \par 1/31/2023 OTV 1000cGy/5000cGy to the right chest wall: pt feeling well. Denies any fatigue or any skin issues. Using eucerin 2 x day. BP elevated today on today's visit. She took her BP meds this am. denies any symptoms. She has appointment with cardiologist this Thursday but states each time she goes to her cardiologist it is never this elevated. She is followed by  and Dr Fritz as well.  Will be due for surveillance colonoscopy after her radiation (h/o adenocarcinoma of the sigmoid colon).

## 2023-02-21 NOTE — REVIEW OF SYSTEMS
[Fatigue: Grade 0] : Fatigue: Grade 0 [Breast Pain: Grade 0] : Breast Pain: Grade 0 [Dermatitis Radiation: Grade 1 - Faint erythema or dry desquamation] : Dermatitis Radiation: Grade 1 - Faint erythema or dry desquamation [FreeTextEntry6] : moisturizing

## 2023-02-21 NOTE — DISEASE MANAGEMENT
[Clinical] : TNM Stage: c [IIIA] : IIIA [FreeTextEntry4] : right breast IDC ER positive/ OR negative, Her2 Negative [TTNM] : 3 [NTNM] : 1 [MTNM] : x [de-identified] : 0400cGy [de-identified] : 5000cGy [de-identified] : right chest wall

## 2023-02-21 NOTE — PHYSICAL EXAM
[General Appearance - Well Developed] : well developed [General Appearance - Alert] : alert [General Appearance - In No Acute Distress] : in no acute distress [] : no respiratory distress [Oriented To Time, Place, And Person] : oriented to person, place, and time [de-identified] : slight erythema to right chest wall/no breakdown noted [de-identified] : slight erythema to right chest wall/no breakdown noted

## 2023-02-22 ENCOUNTER — OUTPATIENT (OUTPATIENT)
Dept: OUTPATIENT SERVICES | Facility: HOSPITAL | Age: 73
LOS: 1 days | End: 2023-02-22

## 2023-02-22 ENCOUNTER — APPOINTMENT (OUTPATIENT)
Dept: INFUSION THERAPY | Facility: CLINIC | Age: 73
End: 2023-02-22

## 2023-02-22 ENCOUNTER — OUTPATIENT (OUTPATIENT)
Dept: OUTPATIENT SERVICES | Facility: HOSPITAL | Age: 73
LOS: 1 days | Discharge: HOME | End: 2023-02-22
Payer: MEDICARE

## 2023-02-22 DIAGNOSIS — C50.911 MALIGNANT NEOPLASM OF UNSPECIFIED SITE OF RIGHT FEMALE BREAST: ICD-10-CM

## 2023-02-22 DIAGNOSIS — Z00.8 ENCOUNTER FOR OTHER GENERAL EXAMINATION: ICD-10-CM

## 2023-02-22 DIAGNOSIS — C18.7 MALIGNANT NEOPLASM OF SIGMOID COLON: ICD-10-CM

## 2023-02-22 DIAGNOSIS — Z90.49 ACQUIRED ABSENCE OF OTHER SPECIFIED PARTS OF DIGESTIVE TRACT: Chronic | ICD-10-CM

## 2023-02-22 PROCEDURE — 96523 IRRIG DRUG DELIVERY DEVICE: CPT

## 2023-02-23 ENCOUNTER — OUTPATIENT (OUTPATIENT)
Dept: OUTPATIENT SERVICES | Facility: HOSPITAL | Age: 73
LOS: 1 days | End: 2023-02-23
Payer: MEDICARE

## 2023-02-23 DIAGNOSIS — C18.7 MALIGNANT NEOPLASM OF SIGMOID COLON: ICD-10-CM

## 2023-02-23 DIAGNOSIS — Z90.49 ACQUIRED ABSENCE OF OTHER SPECIFIED PARTS OF DIGESTIVE TRACT: Chronic | ICD-10-CM

## 2023-02-23 PROCEDURE — 77387C: CUSTOM

## 2023-02-24 ENCOUNTER — OUTPATIENT (OUTPATIENT)
Dept: OUTPATIENT SERVICES | Facility: HOSPITAL | Age: 73
LOS: 1 days | End: 2023-02-24
Payer: MEDICARE

## 2023-02-24 DIAGNOSIS — C50.911 MALIGNANT NEOPLASM OF UNSPECIFIED SITE OF RIGHT FEMALE BREAST: ICD-10-CM

## 2023-02-24 DIAGNOSIS — Z00.8 ENCOUNTER FOR OTHER GENERAL EXAMINATION: ICD-10-CM

## 2023-02-24 PROCEDURE — 77387C: CUSTOM

## 2023-02-27 ENCOUNTER — OUTPATIENT (OUTPATIENT)
Dept: OUTPATIENT SERVICES | Facility: HOSPITAL | Age: 73
LOS: 1 days | End: 2023-02-27

## 2023-02-27 DIAGNOSIS — Z90.49 ACQUIRED ABSENCE OF OTHER SPECIFIED PARTS OF DIGESTIVE TRACT: Chronic | ICD-10-CM

## 2023-02-27 DIAGNOSIS — C50.911 MALIGNANT NEOPLASM OF UNSPECIFIED SITE OF RIGHT FEMALE BREAST: ICD-10-CM

## 2023-02-27 DIAGNOSIS — Z00.8 ENCOUNTER FOR OTHER GENERAL EXAMINATION: ICD-10-CM

## 2023-02-27 PROCEDURE — 77387C: CUSTOM

## 2023-02-28 ENCOUNTER — OUTPATIENT (OUTPATIENT)
Dept: OUTPATIENT SERVICES | Facility: HOSPITAL | Age: 73
LOS: 1 days | End: 2023-02-28
Payer: MEDICARE

## 2023-02-28 DIAGNOSIS — N64.1 FAT NECROSIS OF BREAST: ICD-10-CM

## 2023-02-28 DIAGNOSIS — C77.3 SECONDARY AND UNSPECIFIED MALIGNANT NEOPLASM OF AXILLA AND UPPER LIMB LYMPH NODES: ICD-10-CM

## 2023-02-28 DIAGNOSIS — E66.9 OBESITY, UNSPECIFIED: ICD-10-CM

## 2023-02-28 DIAGNOSIS — Z90.49 ACQUIRED ABSENCE OF OTHER SPECIFIED PARTS OF DIGESTIVE TRACT: ICD-10-CM

## 2023-02-28 DIAGNOSIS — C50.911 MALIGNANT NEOPLASM OF UNSPECIFIED SITE OF RIGHT FEMALE BREAST: ICD-10-CM

## 2023-02-28 DIAGNOSIS — Z00.8 ENCOUNTER FOR OTHER GENERAL EXAMINATION: ICD-10-CM

## 2023-02-28 DIAGNOSIS — N60.32 FIBROSCLEROSIS OF LEFT BREAST: ICD-10-CM

## 2023-02-28 DIAGNOSIS — Z79.899 OTHER LONG TERM (CURRENT) DRUG THERAPY: ICD-10-CM

## 2023-02-28 DIAGNOSIS — Z90.49 ACQUIRED ABSENCE OF OTHER SPECIFIED PARTS OF DIGESTIVE TRACT: Chronic | ICD-10-CM

## 2023-02-28 DIAGNOSIS — I10 ESSENTIAL (PRIMARY) HYPERTENSION: ICD-10-CM

## 2023-02-28 DIAGNOSIS — N60.42 MAMMARY DUCT ECTASIA OF LEFT BREAST: ICD-10-CM

## 2023-02-28 DIAGNOSIS — L90.5 SCAR CONDITIONS AND FIBROSIS OF SKIN: ICD-10-CM

## 2023-02-28 PROCEDURE — 77387C: CUSTOM

## 2023-03-01 ENCOUNTER — OUTPATIENT (OUTPATIENT)
Dept: OUTPATIENT SERVICES | Facility: HOSPITAL | Age: 73
LOS: 1 days | End: 2023-03-01
Payer: MEDICARE

## 2023-03-01 ENCOUNTER — NON-APPOINTMENT (OUTPATIENT)
Age: 73
End: 2023-03-01

## 2023-03-01 VITALS
SYSTOLIC BLOOD PRESSURE: 180 MMHG | DIASTOLIC BLOOD PRESSURE: 92 MMHG | TEMPERATURE: 97.3 F | OXYGEN SATURATION: 97 % | WEIGHT: 187.13 LBS | HEART RATE: 109 BPM | BODY MASS INDEX: 31.14 KG/M2 | RESPIRATION RATE: 16 BRPM

## 2023-03-01 DIAGNOSIS — Z90.49 ACQUIRED ABSENCE OF OTHER SPECIFIED PARTS OF DIGESTIVE TRACT: Chronic | ICD-10-CM

## 2023-03-01 PROCEDURE — 77387C: CUSTOM

## 2023-03-01 PROCEDURE — 77387 GUIDANCE FOR RADJ TX DLVR: CPT

## 2023-03-01 PROCEDURE — 77412 RADIATION TX DELIVERY LVL 3: CPT

## 2023-03-01 NOTE — DISEASE MANAGEMENT
[Clinical] : TNM Stage: c [IIIA] : IIIA [FreeTextEntry4] : right breast IDC ER positive/ [place] negative, Her2 Negative [TTNM] : 3 [NTNM] : 1 [MTNM] : x [de-identified] : 5000cGy [de-identified] : 5000cGy [de-identified] : right chest wall

## 2023-03-01 NOTE — REVIEW OF SYSTEMS
[Fatigue: Grade 0] : Fatigue: Grade 0 [Breast Pain: Grade 0] : Breast Pain: Grade 0 [Dermatitis Radiation: Grade 1 - Faint erythema or dry desquamation] : Dermatitis Radiation: Grade 1 - Faint erythema or dry desquamation [FreeTextEntry6] : mild erythema/ no breakdown/ moisturizing

## 2023-03-01 NOTE — HISTORY OF PRESENT ILLNESS
[FreeTextEntry1] : 3/1/2023 OTV. 5000cGy/5000cGy to the right chest wall. Feeling well.Fatigue manageable. Skin slightly red and hyperpigmented at right chest area but no skin breakdown. She is moisturizing daily. Denies any pain or pruritus. She completed all 25 Fx today. Discharge instructions and follow up instructions provided. \par \par \par \par 2/21/2023 OTV 3800cGy/5000cGy to the right chest wall: Patient doing well. Denies any pain or skin breakdown. Moisturizing daily 2 -3 times. Denies fatigue. \par \par 2/14/2023 OTV 3000cGy/5000cGy to the right chest wall: patient doing well.Denies any fatigue. Denies pain or any skin breakdown to right chest area. She uses aquaphor 2-3 times a day.\par \par 2/7/2023 OTV 2000cGy/5000cGy to the right chest wall: she is doing well. denies any pain. \par \par 1/31/2023 OTV 1000cGy/5000cGy to the right chest wall: pt feeling well. Denies any fatigue or any skin issues. Using eucerin 2 x day. BP elevated today on today's visit. She took her BP meds this am. denies any symptoms. She has appointment with cardiologist this Thursday but states each time she goes to her cardiologist it is never this elevated. She is followed by  and Dr Fritz as well.  Will be due for surveillance colonoscopy after her radiation (h/o adenocarcinoma of the sigmoid colon).

## 2023-03-01 NOTE — PHYSICAL EXAM
[General Appearance - Well Developed] : well developed [General Appearance - Alert] : alert [General Appearance - In No Acute Distress] : in no acute distress [] : no respiratory distress [de-identified] : slight erythema to right chest wall/ no breakdown/ [de-identified] : slight erythema to right chest wall/ no breakdown/

## 2023-03-01 NOTE — PHYSICAL EXAM
[General Appearance - Well Developed] : well developed [General Appearance - Alert] : alert [General Appearance - In No Acute Distress] : in no acute distress [] : no respiratory distress [de-identified] : slight erythema to right chest wall/ no breakdown/ [de-identified] : slight erythema to right chest wall/ no breakdown/

## 2023-03-01 NOTE — PHYSICAL EXAM
[General Appearance - Well Developed] : well developed [General Appearance - Alert] : alert [General Appearance - In No Acute Distress] : in no acute distress [] : no respiratory distress [de-identified] : slight erythema to right chest wall/ no breakdown/ [de-identified] : slight erythema to right chest wall/ no breakdown/

## 2023-03-01 NOTE — DISEASE MANAGEMENT
[Clinical] : TNM Stage: c [IIIA] : IIIA [FreeTextEntry4] : right breast IDC ER positive/ [place] negative, Her2 Negative [TTNM] : 3 [NTNM] : 1 [MTNM] : x [de-identified] : 5000cGy [de-identified] : 5000cGy [de-identified] : right chest wall

## 2023-03-01 NOTE — DISEASE MANAGEMENT
[Clinical] : TNM Stage: c [IIIA] : IIIA [FreeTextEntry4] : right breast IDC ER positive/ MD negative, Her2 Negative [TTNM] : 3 [NTNM] : 1 [MTNM] : x [de-identified] : 5000cGy [de-identified] : 5000cGy [de-identified] : right chest wall

## 2023-03-02 ENCOUNTER — OUTPATIENT (OUTPATIENT)
Dept: OUTPATIENT SERVICES | Facility: HOSPITAL | Age: 73
LOS: 1 days | End: 2023-03-02

## 2023-03-02 DIAGNOSIS — Z90.49 ACQUIRED ABSENCE OF OTHER SPECIFIED PARTS OF DIGESTIVE TRACT: Chronic | ICD-10-CM

## 2023-03-03 DIAGNOSIS — Z00.8 ENCOUNTER FOR OTHER GENERAL EXAMINATION: ICD-10-CM

## 2023-03-03 DIAGNOSIS — C50.911 MALIGNANT NEOPLASM OF UNSPECIFIED SITE OF RIGHT FEMALE BREAST: ICD-10-CM

## 2023-03-15 ENCOUNTER — APPOINTMENT (OUTPATIENT)
Dept: SURGERY | Facility: CLINIC | Age: 73
End: 2023-03-15
Payer: MEDICARE

## 2023-03-15 VITALS
SYSTOLIC BLOOD PRESSURE: 140 MMHG | TEMPERATURE: 97.3 F | BODY MASS INDEX: 30.66 KG/M2 | WEIGHT: 184 LBS | OXYGEN SATURATION: 97 % | HEIGHT: 65 IN | HEART RATE: 109 BPM | DIASTOLIC BLOOD PRESSURE: 80 MMHG

## 2023-03-15 DIAGNOSIS — K56.609 UNSPECIFIED INTESTINAL OBSTRUCTION, UNSPECIFIED AS TO PARTIAL VERSUS COMPLETE OBSTRUCTION: ICD-10-CM

## 2023-03-15 PROCEDURE — 99213 OFFICE O/P EST LOW 20 MIN: CPT

## 2023-03-22 ENCOUNTER — APPOINTMENT (OUTPATIENT)
Dept: INFUSION THERAPY | Facility: CLINIC | Age: 73
End: 2023-03-22

## 2023-03-22 ENCOUNTER — OUTPATIENT (OUTPATIENT)
Dept: OUTPATIENT SERVICES | Facility: HOSPITAL | Age: 73
LOS: 1 days | Discharge: HOME | End: 2023-03-22
Payer: MEDICARE

## 2023-03-22 ENCOUNTER — APPOINTMENT (OUTPATIENT)
Dept: HEMATOLOGY ONCOLOGY | Facility: CLINIC | Age: 73
End: 2023-03-22

## 2023-03-22 ENCOUNTER — LABORATORY RESULT (OUTPATIENT)
Age: 73
End: 2023-03-22

## 2023-03-22 ENCOUNTER — APPOINTMENT (OUTPATIENT)
Dept: INFUSION THERAPY | Facility: CLINIC | Age: 73
End: 2023-03-22
Payer: MEDICARE

## 2023-03-22 ENCOUNTER — APPOINTMENT (OUTPATIENT)
Dept: HEMATOLOGY ONCOLOGY | Facility: CLINIC | Age: 73
End: 2023-03-22
Payer: MEDICARE

## 2023-03-22 VITALS
TEMPERATURE: 98.6 F | HEART RATE: 98 BPM | BODY MASS INDEX: 29.25 KG/M2 | SYSTOLIC BLOOD PRESSURE: 158 MMHG | HEIGHT: 66 IN | WEIGHT: 182 LBS | RESPIRATION RATE: 18 BRPM | DIASTOLIC BLOOD PRESSURE: 90 MMHG

## 2023-03-22 DIAGNOSIS — Z45.2 ENCOUNTER FOR ADJUSTMENT AND MANAGEMENT OF VASCULAR ACCESS DEVICE: ICD-10-CM

## 2023-03-22 DIAGNOSIS — C18.7 MALIGNANT NEOPLASM OF SIGMOID COLON: ICD-10-CM

## 2023-03-22 DIAGNOSIS — Z90.49 ACQUIRED ABSENCE OF OTHER SPECIFIED PARTS OF DIGESTIVE TRACT: Chronic | ICD-10-CM

## 2023-03-22 PROCEDURE — 99214 OFFICE O/P EST MOD 30 MIN: CPT

## 2023-03-22 PROCEDURE — 82378 CARCINOEMBRYONIC ANTIGEN: CPT

## 2023-03-22 PROCEDURE — 36415 COLL VENOUS BLD VENIPUNCTURE: CPT

## 2023-03-22 PROCEDURE — 85027 COMPLETE CBC AUTOMATED: CPT

## 2023-03-22 PROCEDURE — 80053 COMPREHEN METABOLIC PANEL: CPT

## 2023-03-22 PROCEDURE — 86300 IMMUNOASSAY TUMOR CA 15-3: CPT

## 2023-03-22 NOTE — PHYSICAL EXAM
[Fully active, able to carry on all pre-disease performance without restriction] : Status 0 - Fully active, able to carry on all pre-disease performance without restriction [Normal] : affect appropriate [de-identified] : right breast s/p mastectomy, s/p radiation with skin reaction. No palpable abnormality in the left breast.

## 2023-03-22 NOTE — RESULTS/DATA
[FreeTextEntry1] : Pathology report from 22:\par Accession:                             [id number]\par \par Collected Date/Time:                   2022 15:48 EST\par Received Date/Time:                    2022 16:23 EST\par \par Addendum Report - Auth (Verified)\par \par Addendum\par 3) HER2 IHC Analysis was performed at [facility].\par Their report is attached below.\par ----------------------------------------------------------\par [facility]\par [address]\par [place], [place]  [address]\par [phone]\par \par INTEGRATED SPECIMEN#: [id number]\par INTEGRATED CASE#: [id number]\par DATE SIGNED OUT: 22 by Celina Pagan M.D. Ph.D\par HOSPITAL ID#: [id number]\par \par BODY SITE: Breast, right\par \par CLINICAL DATA:  Colloid carcinoma, S/P neoadjuvant chemotherapy\par \par MARKER               RESULTS            INTERPRETATION\par HER2 IHC                  0\par Negative\par \par Fixation Information :\par \par Fixative: Formalin\par Time of Fixation: <1 hour\par Duration of Fixation: >9 hour(s)\par Note:  Time to fixation = cold ischemic time\par \par HER2 IHC Results :\par Complete membrane stainin%\par Uniform staining: Absent\par Homogeneous, dark circumferential pattern: Absent\par Sample Adequate for analysis: Yes\par \par This interpretation or diagnosis is contingent on the specimen and\par the clinical information received. This analysis is an adjunct to the\par evaluation of the referring physician and does not represent a final\par diagnosis.\par \par This test was developed and its performance characteristics have been\par determined by [facility].  It has not been\par cleared or approved by the FDA.  The FDA has determined that such\par clearance or approval is not necessary. The laboratory is regulated under\par the Clinical Laboratory Improvement Amendment of 1988 (CLIA) as qualified\par to perform high complexity testing.\par \par [facility] is a business unit of [facility], a wholly-owned subsidiary of [facility] [employer]par \par Complete report is available in the electronic medical record as a\par PATHOLOGY CONSULT REPORT\par \par Verified by: Fabien Elias M.D.\par (Electronic Signature)\par Reported on: 22 12:18 EST, [facility],\par [address] [facility], [place] [address]\par Phone: [phone]   Fax: [phone]\par _________________________________________________________________\par Surgical Pathology Report - Auth (Verified)\par \par Specimen(s) Submitted\par  E 1  Left breast core biopsy anterior\par Time obtained: 3:48 pm\par Cold ischemic time: <1 hour\par 2  Left breast core biopsy posterior\par Time obtained: 3:52 pm\par Cold ischemic time: <1 hour\par 3  Right breast\par Time obtained: 5:33 pm\par Cold ischemic time: <1 hour\par 4  Right axillary fat pad\par Time obtained: 5:38 pm\par Cold ischemic time: <1 hour\par \par \par Final Diagnosis\par 1.  Breast, left anterior core biopsy site, radiofrequency seed localized\par lumpectomy:\par - Radial sclerosing lesion (radial scar) located adjacent to a healing\par prior biopsy site.\par - Benign atrophic breast tissue with fibrocystic changes, both\par proliferative and non-proliferative types, associated with numerous\par microcalcifications.\par - Focal mammary duct ectasia.\par \par 2.  Breast, left posterior core biopsy site, radiofrequency seed localized\par lumpectomy:\par - Radial sclerosing lesion (radial scar).\par - Benign atrophic breast tissue with fibrocystic changes, both\par proliferative and non-proliferative types, associated with\par microcalcifications.\par - Healing prior biopsy site.\par \par 3.  Breast, right, modified radical mastectomy:\par - Large fibrous tumor bed in the upper inner and upper outer quadrants\par with necrosis, chronic inflammation, coarse stromal calcifications, and\par a remote prior biopsy site along with abundant acellular mucin except\par for a 1.4 cm (microscopic measurements) focus of residual invasive well\par differentiated ductal carcinoma (colloid carcinoma).\par - No intraductal component nor lymphovascular invasion is seen.\par - Atrophic fatty breast tissue with a radial sclerosing lesion\par (radial scar), proliferative type fibrocystic changes associated with\par microcalcifications, and diffuse histologic changes consistent with prior\par systemic therapy effect.\par - Unremarkable nipple, skin, and deep fascial margin.  Negative for\par carcinoma.\par - Pending special studies for Her2/SIRI oncoprotein expression and/or gene\par amplification, with the results to follow and be reported as a separate\par addendum.\par

## 2023-03-22 NOTE — ASSESSMENT
[FreeTextEntry1] : 73 yo female has synchronous colon cancer and breast cancer:\par - Invasive moderately differentiated adenocarcinoma of the sigmoid colon, MMR proficient, s/p robotic sigmoidectomy and colostomy. 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 Clinical stage is cT3N1.\par \par Assessment and Plan:\par #  Invasive well to moderately differentiated ductal carcinoma of the right breast, ER positive, MD and Her-2 negative, clinical stage cT3N1.\par -- Completed  AC x 4 and weekly Taxol x 12. \par -- S/P  right MRM and left breast lumpectomy on 12/8/22. The right mastectomy showed 1.4 cm residual invasive well differentiated ductal carcinoma (colloid carcinoma), %, MD neg, KI 67 2% and Her-2 negative. There are metastatic carcinoma present in three of twenty-eight axillary lymph nodes (3/28), the largest focus of which measures 3.5 mm, +JESSICA.  AJCC 8th Edition Pathologic Stage: ypT1c, ypN1a, ypMx, ypPR (pathologic partial response to systemic neoadjuvant chemotherapy). \par -- S/P PMRT completed  treatment  on 03/01/2023. Followup with Dr. Miller.\par -- Continue Letrozole daily ( Start date 09/2022) \par -- Restaging CT C/A/P showed Unchanged bilateral few pulmonary nodules measuring up to 4 mm. Interval decrease in size of previously enlarged right axillary lymph node. Indeterminate hyperenhancing 1.5 cm right hepatic lobe lesion. MRI abdomen showed Hyperenhancing right hepatic lobe lesion, demonstrating restricted diffusion, indeterminate. Metastasis is on differential. On 10/13/22, CT guided bx of the right liver lesion is Negative for malignancy. The pathology showed focally distorted hepatic parenchyma. Differential diagnosis include nodular regenerative hyperplasia, focal nodular hyperplasia and hepatocellular adenoma. Will repeat imaging script given today for CT C/A/P\par -- Will refer her for screening mammo of the left breast. A  script given today\par -- Bone density was normal. Advised to continue calcium and vitamin D and  regular exercise\par -- Port flush today. Will refer her to IR to remove port. \par -- Order blood work: CBC, BMP, LFT, CEA, .\par \par # IIA ([id number]) adenocarcinoma of the sigmoid colon, MMR proficient, s/p robotic sigmoidectomy. \par -- Follow up with GI for surveillance colonoscopy (Dr Fritz). She will follow up GI after completion of breast radiation therapy.\par \par -- RTC  3 months \par

## 2023-03-22 NOTE — HISTORY OF PRESENT ILLNESS
[de-identified] : 70 yo female was referred by Dr. Quinonez for consultation of breast cancer. Jesica initially presented to hospital for constipation and abdominal pain. CT a/p on 11/24/21 showed  large bowel obstruction secondary to a likely malignant stricture in the proximal sigmoid colon, small volume ascites and lobulated, partially visualized right breast mass and left breast nodule. Physical exam correlation and correlation with dedicated breast imaging was recommended as malignancy is of concern.\par \par On 11/26/21, CT chest showed right and left breast masses. The right breast mass measured 6.5 x 3.8 x 7.0 cm and contained small calcifications. The left breast mass measured 3.1 x 2.9 x 2.6 cm.\par \par On 11/29/21, colonoscopy revealed sigmoid colon mass and and biopsy showed invasive adenocarcinoma, moderately differentiated. MMR proteins were all expressed. 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 \par \par On 12/21/21, b/l breast dx mammo and US were performed which showed 10 cm mass at the 11 to 1:00 position 3 to 4 cm from the nipple corresponding to the biopsy-proven right breast carcinoma.  In the right axilla, at the 11:00 position 13 cm from the nipple, there is an abnormal lymph node measuring 2.3 x 1.3 x 0.9 cm. Ultrasound-guided biopsy is recommended. In the left breast, there are scattered similar appearing subcentimeter circumscribed masses seen throughout the left breast. These correspond to mammographically demonstrated masses:\par At the 3:00 position 6 cm from the nipple, there is a mass measuring 0.4 x 0.3 x 0.3 cm. Ultrasound-guided biopsy of this mass is recommended.\par At the 7 to 8:00 position 5 cm from the nipple, there is a mass measuring 0.4 x 0.3 x 0.2 cm.\par At the 8 to 9:00 position 4 cm from the nipple, there is a circumscribed mass measuring 0.4 x 0.4 x 0.3 cm.\par At the 8 to 9:00 position 3 cm from nipple, corresponding to area palpable concern and mammographic findings, there is a heterogeneous mass measuring 2.5 x 2.3 x 2.0 cm. Ultrasound-guided biopsy is recommended.\par \par On 12/30/21, left breast 3 N6 mass, ultrasound guided needle core biopsies revealed radial sclerosing lesion (radial scar) and Benign atrophic breast tissue with proliferative type fibrocystic changes. Left breast 8-9 N3 mass, ultrasound guided needle core biopsies showed single minute fragment of benign atrophic breast tissue.  \par The right axillary mass, ultrasound guided needle core biopsies were positive for moderately differentiated adenocarcinoma, most likely representative of metastasis from the patient's known mammary primary carcinoma to an axillary lymph node with mervat replacement, obliteration, and extensive extracapsular extension.\par \par On 1/19/22, left breast relatively posterior calcifications, stereotactic guided vacuum assisted needle core biopsies showed radial sclerosing lesion (radial scar) and benign atrophic breast tissue with proliferative type fibrocystic changes. Left relatively anterior calcifications, stereotactic guided vacuum assisted needle core biopsies showed benign atrophic breast tissue with proliferative type fibrocystic changes.  \par \par On 1/5/22, she had b/l breast MRI which showed 8 cm biopsy-proven right breast index carcinoma and axillary mervat metastasis. Biopsy-proven left breast radial scar with no additional sites of suspicious enhancement.\par \par On 2/17/22, she underwent robotic sigmoidectomy and colostomy. The pathology revealed Invasive moderately differentiated adenocarcinoma (6 cm in greatest dimension), invading through muscularis propria into pericolorectal tissue (pT3), +LVI.  All margins were negative for invasive carcinoma.  Twenty two lymph nodes were negative for metastatic carcinoma. She recovered well from surgery. \par \par She is here today with her  to discuss breast cancer treatment. She has no family h/o breast cancer or any other kinds of cancer.  [de-identified] : 4/21/22:susanne Mooney is here today for followup and chemotherapy. She has synchronous colon cancer and breast cancer: invasive moderately differentiated adenocarcinoma of the sigmoid colon, MMR proficient, s/p robotic sigmoidectomy and colostomy. 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 Clinical stage is cT3N1. Her staging CT and bone scan did not show evidence of distant mets. She is here today to start neoadjuvant chemotherapy for breast cancer. \par \par 5/9/22:susanne Mooney is here today for followup and chemotherapy. She has synchronous colon cancer and breast cancer: invasive moderately differentiated adenocarcinoma of the sigmoid colon, MMR proficient, s/p robotic sigmoidectomy and colostomy. 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 Clinical stage is cT3N1. Her staging CT and bone scan did not show evidence of distant mets. She started on neoadjuvant chemotherapy for breast cancer. She had 1st cycle AC 2 weeks ago and tolerated well. \par \par 5/23/22susanne Mooney is here today for followup and chemotherapy. She has synchronous colon cancer and breast cancer: invasive moderately differentiated adenocarcinoma of the sigmoid colon, MMR proficient, s/p robotic sigmoidectomy and colostomy. 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 Clinical stage is cT3N1. Her staging CT and bone scan did not show evidence of distant mets. She started on neoadjuvant chemotherapy. To date, she has had 2 cycles and tolerated well. She did not have fever, mouth sore, vomiting or diarrhea. \par \par 6/6/22susanne Mooney is here today for followup and chemotherapy. She has synchronous colon cancer and breast cancer: invasive moderately differentiated adenocarcinoma of the sigmoid colon, MMR proficient, s/p robotic sigmoidectomy and colostomy. 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 Clinical stage is cT3N1. Her staging CT and bone scan did not show evidence of distant mets. She has been on neoadjuvant chemotherapy with dose dense AC. To date, she has had 3 cycles and tolerated well. She did not have fever, mouth sore, vomiting or diarrhea. She does endorse some burning/tearing sensation in the eye. \par \par 6/20/22:susanne Mooney is here today for followup and chemotherapy. She has synchronous colon cancer and breast cancer: invasive moderately differentiated adenocarcinoma of the sigmoid colon, MMR proficient, s/p robotic sigmoidectomy and colostomy. 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 Clinical stage is cT3N1. Her staging CT and bone scan did not show evidence of distant mets. She has been on neoadjuvant chemotherapy. She completed dose dense AC x 4. She feels more fatigue. Her eye burning is getting better.\par \par 7/11/22susanne Mooney is here for follow up and chemotherapy.  She has synchronous colon cancer and breast cancer: invasive moderately differentiated adenocarcinoma of the sigmoid colon, MMR proficient, s/p robotic sigmoidectomy and colostomy. 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 Clinical stage is cT3N1. Her staging CT and bone scan did not show evidence of distant mets. She has been on neoadjuvant chemotherapy. She completed dose dense AC x 4. She completed 3 doses of weekly Taxol so far. She feels well. Her energy level is better and burning of the eyes is resolved. She followed up with Colorectal sx and they want to wait until Taxol is finished for her chemotherapy.\par \par 09/7/22susanne Mooney is here for follow up and chemotherapy.  She has synchronous colon cancer and breast cancer: invasive moderately differentiated adenocarcinoma of the sigmoid colon, MMR proficient, s/p robotic sigmoidectomy and colostomy. 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 Clinical stage is cT3N1. Her staging CT and bone scan did not show evidence of distant mets. She has been on neoadjuvant chemotherapy. She completed dose dense AC x 4. Currently, she is on weekly Taxol. To date, she has had 11 treatments. She tolerated chemotherapy well. \par \par 10/5/22\svitlana Mooney is here for follow up. She has synchronous colon cancer and breast cancer: invasive moderately differentiated adenocarcinoma of the sigmoid colon, MMR proficient, s/p robotic sigmoidectomy and colostomy. 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 Clinical stage is cT3N1. Her staging CT and bone scan did not show evidence of distant mets. She completed neoadjuvant chemotherapy with dose dense AC x 4 and weekly Taxol x 12. She started letrozole on 9/15 and tolerating well. She had breast MRI 9/22 showed biopsy-proven malignant right breast mass with associated skin thickening. Interval reduction in size of prior noted prominent right axillary lymph node likely treatment related. No MRI evidence of malignancy in the left breast. She also had restaging CT C/A/P which showed Unchanged bilateral few pulmonary nodules measuring up to 4 mm. Interval decrease in size of previously enlarged right axillary lymph node. Indeterminate hyperenhancing 1.5 cm right hepatic lobe lesion. MRI abdomen on 9/25/22 showed Hyperenhancing right hepatic lobe lesion, demonstrating restricted diffusion, indeterminate. Metastasis is on differential. She is pending biopsy of liver lesion. \par \par 11/2/22:susanne Mooney is here for follow up. She has synchronous colon cancer and breast cancer: invasive moderately differentiated adenocarcinoma of the sigmoid colon, MMR proficient, s/p robotic sigmoidectomy and colostomy. 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 Clinical stage is cT3N1. She completed neoadjuvant chemotherapy with dose dense AC x 4 and weekly Taxol x 12. She started letrozole on 9/15 and tolerating well.  She had restaging CT C/A/P which showed Unchanged bilateral few pulmonary nodules measuring up to 4 mm. Interval decrease in size of previously enlarged right axillary lymph node. Indeterminate hyperenhancing 1.5 cm right hepatic lobe lesion. MRI abdomen on 9/25/22 showed Hyperenhancing right hepatic lobe lesion, demonstrating restricted diffusion, indeterminate. On 10/13/22, she had CT guided bx of the right liver lesion which is Negative for malignancy. The pathology showed focally distorted hepatic parenchyma. Differential diagnosis include nodular regenerative hyperplasia, focal nodular hyperplasia and hepatocellular adenoma. \par \par \par 12/28/22:\par Jesica is here for follow up. She has synchronous colon cancer and breast cancer: invasive moderately differentiated adenocarcinoma of the sigmoid colon, MMR proficient, s/p robotic sigmoidectomy and colostomy. \par  Patient reports feeling well, Right breast healing very well, no discharges.  \par On 12/8/22 S/P Right Modified radical mastectomy. left breast lumpectomy \par Post surgical Pathology report :\par Final Diagnosis\par 1.  Breast, left anterior core biopsy site, radiofrequency seed localized\par lumpectomy:\par - Radial sclerosing lesion (radial scar) located adjacent to a healing\par prior biopsy site.\par - Benign atrophic breast tissue with fibrocystic changes, both\par proliferative and non-proliferative types, associated with numerous\par microcalcifications.\par \par 2.  Breast, left posterior core biopsy site, radiofrequency seed localized\par lumpectomy:\par - Radial sclerosing lesion (radial scar).\par - Benign atrophic breast tissue with fibrocystic changes, both\par proliferative and non-proliferative types, associated with\par microcalcifications.\par - Healing prior biopsy site.\par \par 01/25/2023:\par Jesica is here for follow up. She has synchronous colon cancer and breast cancer, stage is IIA ([id number]) invasive moderately differentiated adenocarcinoma of the sigmoid colon, MMR proficient, s/p robotic sigmoidectomy and colostomy, and invasive well to moderately differentiated ductal carcinoma of the right breast ER/KS positive and Her-2 negative, Clinical stage is cT3N1, s/p neoadjuvant chemo with AC-T followed right MRM and left breast lumpectomy on 12/8/22. The right mastectomy showed 1.4 cm residual invasive well differentiated ductal carcinoma (colloid carcinoma), %, KS neg, KI 67 2% and Her-2 negative. There are metastatic carcinoma present in three of twenty-eight axillary lymph nodes (3/28), the largest focus of which measures 3.5 mm, +JESSICA.  AJCC 8th Edition Pathologic Stage: ypT1c, ypN1a, ypMx, ypPR (pathologic partial response to systemic neoadjuvant chemotherapy). She has been taking Letrozole daily and tolerated well. She saw Dr. Miller and started on PMRT. \par \par 03/22/2023\svitlana Mooney is here for follow up. She has synchronous colon cancer and breast cancer, stage is IIA ([id number]) invasive moderately differentiated adenocarcinoma of the sigmoid colon, MMR proficient, s/p robotic sigmoidectomy and colostomy, and invasive well to moderately differentiated ductal carcinoma of the right breast ER/KS positive and Her-2 negative, Clinical stage is cT3N1, s/p neoadjuvant chemo with AC-T followed right MRM and left breast lumpectomy on 12/8/22. The right mastectomy showed 1.4 cm residual invasive well differentiated ductal carcinoma (colloid carcinoma), %, KS neg, KI 67 2% and Her-2 negative. three of twenty-eight axillary lymph nodes (3/28) were positive, +JESSICA.  AJCC 8th Edition Pathologic Stage: ypT1c, ypN1a, ypMx, ypPR. She has been taking Letrozole daily and tolerated well. She saw Dr. Miller and completed PMRT on 03/01/2023. She still has skin reaction at radiation site. \par \par

## 2023-03-23 ENCOUNTER — APPOINTMENT (OUTPATIENT)
Dept: SURGERY | Facility: CLINIC | Age: 73
End: 2023-03-23
Payer: MEDICARE

## 2023-03-23 VITALS
TEMPERATURE: 97.6 F | HEIGHT: 66 IN | HEART RATE: 120 BPM | SYSTOLIC BLOOD PRESSURE: 178 MMHG | BODY MASS INDEX: 30.7 KG/M2 | DIASTOLIC BLOOD PRESSURE: 98 MMHG | OXYGEN SATURATION: 97 % | WEIGHT: 191 LBS

## 2023-03-23 DIAGNOSIS — Z51.81 ENCOUNTER FOR THERAPEUTIC DRUG LEVEL MONITORING: ICD-10-CM

## 2023-03-23 DIAGNOSIS — K76.9 LIVER DISEASE, UNSPECIFIED: ICD-10-CM

## 2023-03-23 DIAGNOSIS — Z95.828 PRESENCE OF OTHER VASCULAR IMPLANTS AND GRAFTS: ICD-10-CM

## 2023-03-23 DIAGNOSIS — C50.919 MALIGNANT NEOPLASM OF UNSPECIFIED SITE OF UNSPECIFIED FEMALE BREAST: ICD-10-CM

## 2023-03-23 PROCEDURE — 99212 OFFICE O/P EST SF 10 MIN: CPT

## 2023-03-23 NOTE — HISTORY OF PRESENT ILLNESS
[de-identified] :  the patient returns with her  for further follow-up after her recent right mastectomy.  She remains on letrozole via Dr. AKUA MARCOS, who she saw yesterday.  Postmastectomy radiation completed on March 1 and she has no complaints in that regard.  She was seen by Dr. Fritz last week and will be having a colonoscopy on April 13 with Dr. Bean.  She also reports that CT scans and a repeat left mammogram are pending as per the oncologist.  She is awaiting scheduling of her port removal.\par \par Right MRM and excision of left radial scars December 2022 she had a 14 mm residual area of cancer in the breast and 3 of 28 lymph nodes positive,   Following neoadjuvant therapy.

## 2023-03-23 NOTE — PHYSICAL EXAM
[de-identified] :   Healthy [de-identified] :  no adenopathy [de-identified] :  left breast is free of any discrete masses or suspicious areas.  The incision in the lower outer quadrant continues to heal without problems.  Right chest radiation field shows areas of resolving desquamation, and a 1 cm open area at the lateral aspect of the mastectomy scar near the axilla which is clean and granulating.  This was cleaned with peroxide and a bacitracin dressing was applied.  No new masses noted in the mastectomy site.  No axillary adenopathy bilaterally.  No right upper extremity lymphedema.  Normal range of motion of right upper extremity.

## 2023-03-23 NOTE — ASSESSMENT
[FreeTextEntry1] :   Good progress overall.  Local care instructions were provided for the small open lesion as described above which should heal without problems.  She will also have this reevaluated when she returns to Dr. Miller next month for radiation oncology reevaluation.    She will return here in about 3 months for reexamination or sooner as needed.  If she does not have arrangements made for the Port-A-Cath to be removed by IR, she can contact my office to have this arranged.  All their questions were answered and they are happy with the assessment and plan.

## 2023-03-27 PROBLEM — K56.609 LARGE BOWEL OBSTRUCTION: Status: ACTIVE | Noted: 2021-12-06

## 2023-03-27 NOTE — PHYSICAL EXAM
[No HSM] : no hepatosplenomegaly [Respiratory Effort] : normal respiratory effort [Normal Rate and Rhythm] : normal rate and rhythm [Abdomen Masses] : No abdominal masses [Abdomen Tenderness] : ~T No ~M abdominal tenderness [de-identified] : Soft, non tender, non distended. Well healed incisions. RUQ stoma site healed. No erythema induration or purulence.  [de-identified] : awake, alert and in no acute distress.

## 2023-03-27 NOTE — ADDENDUM
[FreeTextEntry1] : I, Sarahy Infante assisted in documentation on 03/15/2023 acting as a scribe for Dr. Roland Fritz.\par

## 2023-03-27 NOTE — HISTORY OF PRESENT ILLNESS
[FreeTextEntry1] : Patient is a 72F with PMH of right breast mass biopsy proven ductal carcinoma with mucinous features, s/p transverse loop colostomy creation for LBO secondary to obstructing sigmoid adenocarcinoma no presents for follow up s/p robotic sigmoidectomy and colostomy closure. Pathology showed T3N0 (0/22LN) moderately differentiated adenocarcinoma with LVI. She is tolerating a diet and having daily BM. Patient is currently undergoing surveillance of her Colon Cancer. She did not need Adjuvant Chemotherapy. She had a liver lesion on MR. Liver biopsy was negative for malignancy. She presents today for follow-up. Patient is tolerating a diet and having regular bowel movements. Last CT of the chest, abdomen, and pelvis was performed in 9/2022 which showed the indeterminate hepatic lesion, and unchanged bilateral pulmonary nodules up to 4 mm. Patient states she feels well. She is tolerating a diet .Patient denies fevers, chills, nausea, vomiting, abdominal pain, constipation, diarrhea, blood in his stool or unexpected weight loss. Patient denies a family history of colon cancer rectal cancer or inflammatory bowel disease. PT is due for repeat Ct scans in February, as well as a repeat colonoscopy in February or when cleared from her breast CA treatments Patient has not had a full colonoscopy \par  \par

## 2023-03-27 NOTE — ASSESSMENT
[FreeTextEntry1] : 71-year-old female with Concurrent Breast and Obstructing Colon Cancer S/P diverting Loop Colostomy, now S/P Robotic Sigmoidectomy and Colostomy Closure with T3N0 Cancer.\par \par Patient is to follow-up with Dr. Daniel for CT scans and blood work. She will also follow-up with Gastroenterology for Colonoscopy as she has not had a complete Colonoscopy. We will see her back in 3 months.

## 2023-04-03 ENCOUNTER — RESULT REVIEW (OUTPATIENT)
Age: 73
End: 2023-04-03

## 2023-04-03 ENCOUNTER — OUTPATIENT (OUTPATIENT)
Dept: OUTPATIENT SERVICES | Facility: HOSPITAL | Age: 73
LOS: 1 days | End: 2023-04-03
Payer: MEDICARE

## 2023-04-03 DIAGNOSIS — Z85.3 PERSONAL HISTORY OF MALIGNANT NEOPLASM OF BREAST: ICD-10-CM

## 2023-04-03 DIAGNOSIS — Z90.49 ACQUIRED ABSENCE OF OTHER SPECIFIED PARTS OF DIGESTIVE TRACT: Chronic | ICD-10-CM

## 2023-04-03 DIAGNOSIS — Z12.31 ENCOUNTER FOR SCREENING MAMMOGRAM FOR MALIGNANT NEOPLASM OF BREAST: ICD-10-CM

## 2023-04-03 PROCEDURE — G0279: CPT | Mod: 26

## 2023-04-03 PROCEDURE — 77065 DX MAMMO INCL CAD UNI: CPT | Mod: LT

## 2023-04-03 PROCEDURE — 77065 DX MAMMO INCL CAD UNI: CPT | Mod: 26,LT

## 2023-04-03 PROCEDURE — G0279: CPT

## 2023-04-04 DIAGNOSIS — Z85.3 PERSONAL HISTORY OF MALIGNANT NEOPLASM OF BREAST: ICD-10-CM

## 2023-04-04 DIAGNOSIS — Z12.31 ENCOUNTER FOR SCREENING MAMMOGRAM FOR MALIGNANT NEOPLASM OF BREAST: ICD-10-CM

## 2023-04-05 ENCOUNTER — APPOINTMENT (OUTPATIENT)
Dept: RADIATION ONCOLOGY | Facility: HOSPITAL | Age: 73
End: 2023-04-05
Payer: MEDICARE

## 2023-04-05 ENCOUNTER — OUTPATIENT (OUTPATIENT)
Dept: OUTPATIENT SERVICES | Facility: HOSPITAL | Age: 73
LOS: 1 days | End: 2023-04-05
Payer: MEDICARE

## 2023-04-05 VITALS
RESPIRATION RATE: 16 BRPM | BODY MASS INDEX: 30.87 KG/M2 | SYSTOLIC BLOOD PRESSURE: 170 MMHG | DIASTOLIC BLOOD PRESSURE: 82 MMHG | OXYGEN SATURATION: 97 % | TEMPERATURE: 97.7 F | HEART RATE: 112 BPM | WEIGHT: 191.25 LBS

## 2023-04-05 DIAGNOSIS — Z90.49 ACQUIRED ABSENCE OF OTHER SPECIFIED PARTS OF DIGESTIVE TRACT: Chronic | ICD-10-CM

## 2023-04-05 DIAGNOSIS — C50.911 MALIGNANT NEOPLASM OF UNSPECIFIED SITE OF RIGHT FEMALE BREAST: ICD-10-CM

## 2023-04-05 PROCEDURE — 99024 POSTOP FOLLOW-UP VISIT: CPT

## 2023-04-06 ENCOUNTER — RESULT REVIEW (OUTPATIENT)
Age: 73
End: 2023-04-06

## 2023-04-06 ENCOUNTER — OUTPATIENT (OUTPATIENT)
Dept: OUTPATIENT SERVICES | Facility: HOSPITAL | Age: 73
LOS: 1 days | End: 2023-04-06
Payer: MEDICARE

## 2023-04-06 DIAGNOSIS — K76.9 LIVER DISEASE, UNSPECIFIED: ICD-10-CM

## 2023-04-06 DIAGNOSIS — C50.911 MALIGNANT NEOPLASM OF UNSPECIFIED SITE OF RIGHT FEMALE BREAST: ICD-10-CM

## 2023-04-06 DIAGNOSIS — Z00.8 ENCOUNTER FOR OTHER GENERAL EXAMINATION: ICD-10-CM

## 2023-04-06 PROCEDURE — 71260 CT THORAX DX C+: CPT

## 2023-04-06 PROCEDURE — 74177 CT ABD & PELVIS W/CONTRAST: CPT

## 2023-04-06 PROCEDURE — 74177 CT ABD & PELVIS W/CONTRAST: CPT | Mod: 26,MH

## 2023-04-06 PROCEDURE — 71260 CT THORAX DX C+: CPT | Mod: 26,MH

## 2023-04-07 DIAGNOSIS — K76.9 LIVER DISEASE, UNSPECIFIED: ICD-10-CM

## 2023-04-07 DIAGNOSIS — C50.911 MALIGNANT NEOPLASM OF UNSPECIFIED SITE OF RIGHT FEMALE BREAST: ICD-10-CM

## 2023-04-10 NOTE — HISTORY OF PRESENT ILLNESS
[FreeTextEntry1] : 4/5/2023 PTE 5000cGy/5000cGy to the right chest wall: Patient states she is doing well. Denies fatigue or pain at this time. States that she did have red skin to the right breast 1 week post treatment, however that has subsided. She is still moisturizing 2-3 times per day. \par \par 3/1/2023 OTV. 5000cGy/5000cGy to the right chest wall. Feeling well.Fatigue manageable. Skin slightly red and hyperpigmented at right chest area but no skin breakdown. She is moisturizing daily. Denies any pain or pruritus. She completed all 25 Fx today. Discharge instructions and follow up instructions provided. \par \par \par \par 2/21/2023 OTV 3800cGy/5000cGy to the right chest wall: Patient doing well. Denies any pain or skin breakdown. Moisturizing daily 2 -3 times. Denies fatigue. \par \par 2/14/2023 OTV 3000cGy/5000cGy to the right chest wall: patient doing well.Denies any fatigue. Denies pain or any skin breakdown to right chest area. She uses aquaphor 2-3 times a day.\par \par 2/7/2023 OTV 2000cGy/5000cGy to the right chest wall: she is doing well. denies any pain. \par \par 1/31/2023 OTV 1000cGy/5000cGy to the right chest wall: pt feeling well. Denies any fatigue or any skin issues. Using eucerin 2 x day. BP elevated today on today's visit. She took her BP meds this am. denies any symptoms. She has appointment with cardiologist this Thursday but states each time she goes to her cardiologist it is never this elevated. She is followed by  and Dr Fritz as well.  Will be due for surveillance colonoscopy after her radiation (h/o adenocarcinoma of the sigmoid colon).

## 2023-04-10 NOTE — REVIEW OF SYSTEMS
[Fatigue: Grade 0] : Fatigue: Grade 0 [Skin Hyperpigmentation: Grade 1 - Hyperpigmentation covering <10% BSA; no psychosocial impact] : Skin Hyperpigmentation: Grade 1 - Hyperpigmentation covering <10% BSA; no psychosocial impact [Dermatitis Radiation: Grade 0] : Dermatitis Radiation: Grade 0

## 2023-04-10 NOTE — DISEASE MANAGEMENT
[Clinical] : TNM Stage: c [IIIA] : IIIA [FreeTextEntry4] : right breast IDC ER positive/ WI negative, Her2 Negative [TTNM] : 3 [NTNM] : 1 [MTNM] : x [de-identified] : 5000cGy [de-identified] : 5000cGy [de-identified] : right chest wall

## 2023-04-19 ENCOUNTER — OUTPATIENT (OUTPATIENT)
Dept: OUTPATIENT SERVICES | Facility: HOSPITAL | Age: 73
LOS: 1 days | Discharge: HOME | End: 2023-04-19
Payer: MEDICARE

## 2023-04-19 ENCOUNTER — APPOINTMENT (OUTPATIENT)
Dept: INFUSION THERAPY | Facility: CLINIC | Age: 73
End: 2023-04-19

## 2023-04-19 DIAGNOSIS — Z51.81 ENCOUNTER FOR THERAPEUTIC DRUG LEVEL MONITORING: ICD-10-CM

## 2023-04-19 DIAGNOSIS — K76.9 LIVER DISEASE, UNSPECIFIED: ICD-10-CM

## 2023-04-19 DIAGNOSIS — C18.7 MALIGNANT NEOPLASM OF SIGMOID COLON: ICD-10-CM

## 2023-04-19 DIAGNOSIS — Z90.49 ACQUIRED ABSENCE OF OTHER SPECIFIED PARTS OF DIGESTIVE TRACT: Chronic | ICD-10-CM

## 2023-04-19 DIAGNOSIS — C50.919 MALIGNANT NEOPLASM OF UNSPECIFIED SITE OF UNSPECIFIED FEMALE BREAST: ICD-10-CM

## 2023-04-19 PROCEDURE — 96523 IRRIG DRUG DELIVERY DEVICE: CPT

## 2023-05-08 ENCOUNTER — OUTPATIENT (OUTPATIENT)
Dept: OUTPATIENT SERVICES | Facility: HOSPITAL | Age: 73
LOS: 1 days | End: 2023-05-08
Payer: MEDICARE

## 2023-05-08 VITALS
TEMPERATURE: 98 F | HEIGHT: 65 IN | OXYGEN SATURATION: 98 % | DIASTOLIC BLOOD PRESSURE: 80 MMHG | RESPIRATION RATE: 18 BRPM | WEIGHT: 184.97 LBS | SYSTOLIC BLOOD PRESSURE: 160 MMHG

## 2023-05-08 DIAGNOSIS — Z90.11 ACQUIRED ABSENCE OF RIGHT BREAST AND NIPPLE: Chronic | ICD-10-CM

## 2023-05-08 DIAGNOSIS — C18.7 MALIGNANT NEOPLASM OF SIGMOID COLON: ICD-10-CM

## 2023-05-08 DIAGNOSIS — Z95.828 PRESENCE OF OTHER VASCULAR IMPLANTS AND GRAFTS: Chronic | ICD-10-CM

## 2023-05-08 DIAGNOSIS — Z90.49 ACQUIRED ABSENCE OF OTHER SPECIFIED PARTS OF DIGESTIVE TRACT: Chronic | ICD-10-CM

## 2023-05-08 DIAGNOSIS — Z01.818 ENCOUNTER FOR OTHER PREPROCEDURAL EXAMINATION: ICD-10-CM

## 2023-05-08 LAB
ALBUMIN SERPL ELPH-MCNC: 4.7 G/DL — SIGNIFICANT CHANGE UP (ref 3.5–5.2)
ALP SERPL-CCNC: 91 U/L — SIGNIFICANT CHANGE UP (ref 30–115)
ALT FLD-CCNC: 14 U/L — SIGNIFICANT CHANGE UP (ref 0–41)
ANION GAP SERPL CALC-SCNC: 15 MMOL/L — HIGH (ref 7–14)
APTT BLD: 34.5 SEC — SIGNIFICANT CHANGE UP (ref 27–39.2)
AST SERPL-CCNC: 18 U/L — SIGNIFICANT CHANGE UP (ref 0–41)
BASOPHILS # BLD AUTO: 0.03 K/UL — SIGNIFICANT CHANGE UP (ref 0–0.2)
BASOPHILS NFR BLD AUTO: 0.6 % — SIGNIFICANT CHANGE UP (ref 0–1)
BILIRUB SERPL-MCNC: 0.8 MG/DL — SIGNIFICANT CHANGE UP (ref 0.2–1.2)
BUN SERPL-MCNC: 18 MG/DL — SIGNIFICANT CHANGE UP (ref 10–20)
CALCIUM SERPL-MCNC: 10.2 MG/DL — SIGNIFICANT CHANGE UP (ref 8.4–10.5)
CHLORIDE SERPL-SCNC: 101 MMOL/L — SIGNIFICANT CHANGE UP (ref 98–110)
CO2 SERPL-SCNC: 25 MMOL/L — SIGNIFICANT CHANGE UP (ref 17–32)
CREAT SERPL-MCNC: 0.8 MG/DL — SIGNIFICANT CHANGE UP (ref 0.7–1.5)
EGFR: 78 ML/MIN/1.73M2 — SIGNIFICANT CHANGE UP
EOSINOPHIL # BLD AUTO: 0.13 K/UL — SIGNIFICANT CHANGE UP (ref 0–0.7)
EOSINOPHIL NFR BLD AUTO: 2.4 % — SIGNIFICANT CHANGE UP (ref 0–8)
GLUCOSE SERPL-MCNC: 102 MG/DL — HIGH (ref 70–99)
HCT VFR BLD CALC: 44.8 % — SIGNIFICANT CHANGE UP (ref 37–47)
HGB BLD-MCNC: 15.5 G/DL — SIGNIFICANT CHANGE UP (ref 12–16)
IMM GRANULOCYTES NFR BLD AUTO: 0.6 % — HIGH (ref 0.1–0.3)
INR BLD: 0.97 RATIO — SIGNIFICANT CHANGE UP (ref 0.65–1.3)
LYMPHOCYTES # BLD AUTO: 0.48 K/UL — LOW (ref 1.2–3.4)
LYMPHOCYTES # BLD AUTO: 9 % — LOW (ref 20.5–51.1)
MCHC RBC-ENTMCNC: 30 PG — SIGNIFICANT CHANGE UP (ref 27–31)
MCHC RBC-ENTMCNC: 34.6 G/DL — SIGNIFICANT CHANGE UP (ref 32–37)
MCV RBC AUTO: 86.8 FL — SIGNIFICANT CHANGE UP (ref 81–99)
MONOCYTES # BLD AUTO: 0.44 K/UL — SIGNIFICANT CHANGE UP (ref 0.1–0.6)
MONOCYTES NFR BLD AUTO: 8.2 % — SIGNIFICANT CHANGE UP (ref 1.7–9.3)
NEUTROPHILS # BLD AUTO: 4.24 K/UL — SIGNIFICANT CHANGE UP (ref 1.4–6.5)
NEUTROPHILS NFR BLD AUTO: 79.2 % — HIGH (ref 42.2–75.2)
NRBC # BLD: 0 /100 WBCS — SIGNIFICANT CHANGE UP (ref 0–0)
PLATELET # BLD AUTO: 206 K/UL — SIGNIFICANT CHANGE UP (ref 130–400)
PMV BLD: 10.4 FL — SIGNIFICANT CHANGE UP (ref 7.4–10.4)
POTASSIUM SERPL-MCNC: 3.9 MMOL/L — SIGNIFICANT CHANGE UP (ref 3.5–5)
POTASSIUM SERPL-SCNC: 3.9 MMOL/L — SIGNIFICANT CHANGE UP (ref 3.5–5)
PROT SERPL-MCNC: 6.9 G/DL — SIGNIFICANT CHANGE UP (ref 6–8)
PROTHROM AB SERPL-ACNC: 11.1 SEC — SIGNIFICANT CHANGE UP (ref 9.95–12.87)
RBC # BLD: 5.16 M/UL — SIGNIFICANT CHANGE UP (ref 4.2–5.4)
RBC # FLD: 13.4 % — SIGNIFICANT CHANGE UP (ref 11.5–14.5)
SODIUM SERPL-SCNC: 141 MMOL/L — SIGNIFICANT CHANGE UP (ref 135–146)
WBC # BLD: 5.35 K/UL — SIGNIFICANT CHANGE UP (ref 4.8–10.8)
WBC # FLD AUTO: 5.35 K/UL — SIGNIFICANT CHANGE UP (ref 4.8–10.8)

## 2023-05-08 PROCEDURE — 93005 ELECTROCARDIOGRAM TRACING: CPT

## 2023-05-08 PROCEDURE — 36415 COLL VENOUS BLD VENIPUNCTURE: CPT

## 2023-05-08 PROCEDURE — 85025 COMPLETE CBC W/AUTO DIFF WBC: CPT

## 2023-05-08 PROCEDURE — 85610 PROTHROMBIN TIME: CPT

## 2023-05-08 PROCEDURE — 80053 COMPREHEN METABOLIC PANEL: CPT

## 2023-05-08 PROCEDURE — 99214 OFFICE O/P EST MOD 30 MIN: CPT | Mod: 25

## 2023-05-08 PROCEDURE — 85730 THROMBOPLASTIN TIME PARTIAL: CPT

## 2023-05-08 PROCEDURE — 93010 ELECTROCARDIOGRAM REPORT: CPT

## 2023-05-08 RX ORDER — AMLODIPINE BESYLATE 2.5 MG/1
1 TABLET ORAL
Refills: 0 | DISCHARGE

## 2023-05-08 RX ORDER — CALCIUM CARBONATE 500(1250)
1 TABLET ORAL
Qty: 0 | Refills: 0 | DISCHARGE

## 2023-05-08 RX ORDER — LETROZOLE 2.5 MG/1
1 TABLET, FILM COATED ORAL
Qty: 0 | Refills: 0 | DISCHARGE

## 2023-05-08 RX ORDER — CHOLECALCIFEROL (VITAMIN D3) 125 MCG
1 CAPSULE ORAL
Qty: 0 | Refills: 0 | DISCHARGE

## 2023-05-08 RX ORDER — LETROZOLE 2.5 MG/1
1 TABLET, FILM COATED ORAL
Refills: 0 | DISCHARGE

## 2023-05-08 RX ORDER — METOPROLOL TARTRATE 50 MG
1 TABLET ORAL
Qty: 0 | Refills: 0 | DISCHARGE

## 2023-05-08 RX ORDER — CHLORTHALIDONE 50 MG
1 TABLET ORAL
Refills: 0 | DISCHARGE

## 2023-05-08 NOTE — H&P PST ADULT - NSICDXPASTSURGICALHX_GEN_ALL_CORE_FT
PAST SURGICAL HISTORY:  H/O right mastectomy     History of colon resection 2/17/2022  Resection trans colon  resection of sigmoid colon (  colostomy reversed 2/17/2022)    Port-A-Cath in place

## 2023-05-08 NOTE — H&P PST ADULT - REASON FOR ADMISSION
71 Y/O F PMH right breast ca s/p R mastectomy with chemo and radiation now . SCHEDULED FOR PAST FOR  Port removal ON 5/12/23 WITH DR MURPHY UNDER [facility]. Pt states had port placed on 4/21. Pt reports completed 20 rounds of chemo. Last chemo 09/2022 and last radiation 03/2023.

## 2023-05-08 NOTE — H&P PST ADULT - HISTORY OF PRESENT ILLNESS
CURRENTLY  DENIES ANY CP, SOB,PALPITATIONS,COUGH OR DYSURIA  EXERCISE TOLERANCE 2 FOS WITHOUT SOB    AS PER PATIENT  this is his/her complete medical history including medications - PRESCRIPTIONS  OVER THE COUNTER MEDS    pt denies any covid s/s, or 11/2022  tested positive in the past.  Received covid vaccinex 2 doses  pt advised self quarantine till day of procedure    Anesthesia Alert  NO--Difficult Airway  NO--History of neck surgery or radiation  NO--Limited ROM of neck  NO--History of Malignant hyperthermia  NO--No personal or family history of Pseudocholinesterase deficiency.  NO--Prior Anesthesia Complication  NO--Latex Allergy  NO--Loose teeth  NO--History of Rheumatoid Arthritis  NO--DON  NO--Bleeding risk  NO--Other_____  Class II   Pt has right arm precautions  Patient verbalized understanding of instructions and was given the opportunity to ask questions and have them answered.

## 2023-05-08 NOTE — H&P PST ADULT - NSICDXFAMILYHX_GEN_ALL_CORE_FT
FAMILY HISTORY:  Mother  Still living? No  Family history of COPD (chronic obstructive pulmonary disease), Age at diagnosis: Age Unknown    Child  Still living? Unknown  FH: thyroid cancer, Age at diagnosis: Age Unknown

## 2023-05-08 NOTE — H&P PST ADULT - NS PRO FEM REPRO HEALTH SCREEN
Subjective





- Date & Time of Evaluation


Date of Evaluation: 08/01/17


Time of Evaluation: 07:14





- Subjective


Subjective: 





S/O no complaints/distress, no f/c n/v/d. pending placement


A/P cont current plan





Objective





- Vital Signs/Intake and Output


Vital Signs (last 24 hours): 


 











Temp Pulse Resp BP Pulse Ox


 


 98.2 F   70   18   109/69   98 


 


 08/01/17 00:38  08/01/17 00:38  08/01/17 00:38  08/01/17 00:38  08/01/17 00:38











- Medications


Medications: 


 Current Medications





Atorvastatin Calcium (Lipitor)  20 mg PO HS [facility]


   Last Admin: 07/31/17 21:48 Dose:  20 mg


Divalproex Sodium (Depakote Dr(*Bid*))  500 mg PO BID [facility]


   Last Admin: 07/31/17 17:06 Dose:  500 mg


Enalapril Maleate (Vasotec)  10 mg PO DAILY [facility]


   Last Admin: 07/31/17 08:40 Dose:  10 mg


Fenofibrate (Tricor)  145 mg PO DAILY [facility]


   Last Admin: 07/31/17 08:40 Dose:  145 mg


Metoprolol Tartrate (Lopressor)  50 mg PO Q12 [facility]


   Last Admin: 07/31/17 21:30 Dose:  50 mg


Quetiapine Fumarate (Seroquel)  25 mg PO HS [facility]


   Last Admin: 07/31/17 21:48 Dose:  25 mg











- Labs


Labs: 


 





 07/13/17 04:20 





 07/13/17 04:20 





 











PT  11.2 SECONDS (9.4-12.0)   12/14/15  05:20    


 


INR  1.05  (0.89-1.20)   12/14/15  05:20    


 


APTT  36.2 SECONDS (23.1-32.0)  H  12/14/15  05:20    














- Constitutional


Appears: Well, Non-toxic, No Acute Distress





- Head Exam


Head Exam: ATRAUMATIC, NORMAL INSPECTION, NORMOCEPHALIC





- Eye Exam


Eye Exam: EOMI, Normal appearance, PERRL


Pupil Exam: NORMAL ACCOMODATION, PERRL





- ENT Exam


ENT Exam: Mucous Membranes Moist, Normal Exam





- Neck Exam


Neck Exam: Full ROM, Normal Inspection.  absent: Lymphadenopathy





- Respiratory Exam


Respiratory Exam: Clear to Ausculation Bilateral, NORMAL BREATHING PATTERN





- Cardiovascular Exam


Cardiovascular Exam: REGULAR RHYTHM, RRR, +S1, +S2.  absent: Murmur





- GI/Abdominal Exam


GI & Abdominal Exam: Soft, Normal Bowel Sounds.  absent: Tenderness





- Extremities Exam


Extremities Exam: Full ROM, Normal Capillary Refill, Normal Inspection.  absent

: Joint Swelling, Pedal Edema





- Back Exam


Back Exam: NORMAL INSPECTION





- Neurological Exam


Neurological Exam: Alert, Awake, CN II-XII Intact, Normal Gait, Oriented x3





- Psychiatric Exam


Psychiatric exam: Normal Affect, Normal Mood





- Skin


Skin Exam: Dry, Intact, Normal Color, Warm





Assessment and Plan


(1) DVT prophylaxis


Status: Chronic   





(2) Scrotal edema


Status: Chronic   





(3) CAD (coronary artery disease)


Status: Chronic   





(4) Smoking


Status: Chronic   





(5) Low back pain


Status: Chronic   





(6) Prediabetes


Status: Chronic   





(7) Neurocognitive disorder


Status: Chronic mammogram

## 2023-05-09 DIAGNOSIS — Z01.818 ENCOUNTER FOR OTHER PREPROCEDURAL EXAMINATION: ICD-10-CM

## 2023-05-09 DIAGNOSIS — C18.7 MALIGNANT NEOPLASM OF SIGMOID COLON: ICD-10-CM

## 2023-05-12 ENCOUNTER — TRANSCRIPTION ENCOUNTER (OUTPATIENT)
Age: 73
End: 2023-05-12

## 2023-05-12 ENCOUNTER — RESULT REVIEW (OUTPATIENT)
Age: 73
End: 2023-05-12

## 2023-05-12 ENCOUNTER — OUTPATIENT (OUTPATIENT)
Dept: OUTPATIENT SERVICES | Facility: HOSPITAL | Age: 73
LOS: 1 days | Discharge: HOME | End: 2023-05-12
Payer: MEDICARE

## 2023-05-12 VITALS
HEIGHT: 66 IN | HEART RATE: 96 BPM | DIASTOLIC BLOOD PRESSURE: 86 MMHG | SYSTOLIC BLOOD PRESSURE: 181 MMHG | RESPIRATION RATE: 16 BRPM | OXYGEN SATURATION: 98 % | TEMPERATURE: 97 F | WEIGHT: 184.97 LBS

## 2023-05-12 VITALS
SYSTOLIC BLOOD PRESSURE: 147 MMHG | OXYGEN SATURATION: 99 % | HEART RATE: 80 BPM | RESPIRATION RATE: 18 BRPM | DIASTOLIC BLOOD PRESSURE: 67 MMHG

## 2023-05-12 DIAGNOSIS — Z90.11 ACQUIRED ABSENCE OF RIGHT BREAST AND NIPPLE: Chronic | ICD-10-CM

## 2023-05-12 DIAGNOSIS — Z90.49 ACQUIRED ABSENCE OF OTHER SPECIFIED PARTS OF DIGESTIVE TRACT: Chronic | ICD-10-CM

## 2023-05-12 DIAGNOSIS — Z95.828 PRESENCE OF OTHER VASCULAR IMPLANTS AND GRAFTS: Chronic | ICD-10-CM

## 2023-05-12 DIAGNOSIS — Z45.2 ENCOUNTER FOR ADJUSTMENT AND MANAGEMENT OF VASCULAR ACCESS DEVICE: ICD-10-CM

## 2023-05-12 DIAGNOSIS — C18.7 MALIGNANT NEOPLASM OF SIGMOID COLON: ICD-10-CM

## 2023-05-12 PROCEDURE — 77001 FLUOROGUIDE FOR VEIN DEVICE: CPT | Mod: 26

## 2023-05-12 PROCEDURE — 36590 REMOVAL TUNNELED CV CATH: CPT

## 2023-05-12 PROCEDURE — 77001 FLUOROGUIDE FOR VEIN DEVICE: CPT

## 2023-05-12 NOTE — ASU DISCHARGE PLAN (ADULT/PEDIATRIC) - CALL YOUR DOCTOR IF YOU HAVE ANY OF THE FOLLOWING:
Bleeding that does not stop/Swelling that gets worse/Pain not relieved by Medications/Fever greater than (need to indicate Fahrenheit or Celsius)/Wound/Surgical Site with redness, or foul smelling discharge or pus
10-Roni-2022

## 2023-05-12 NOTE — PROGRESS NOTE ADULT - SUBJECTIVE AND OBJECTIVE BOX
Interventional Radiology Outpatient Documentation    PREOPERATIVE DAY OF PROCEDURE EVALUATION:     I have personally seen and examined this patient. I agree with the history and physical which I have reviewed and noted any changes below:     Plan is for chemotherapy port removal (Signed electronically Kailash Pérez MD) 05-12-23 @ 4424    Procedure/ risks/ benefits/ goals/ alternatives were explained. All questions answered. Informed content obtained from patient. Consent placed in chart.     POSTOPERATIVE PROCEDURAL EVALUATION:     Procedure: chemotherapy port removal, left side    Pre-Op Diagnosis: patient with malignancy, no longer requiring chemotherapy    Post-Op Diagnosis: same    Attending: Beto  Resident: Ros    Anesthesia (type):  [ ] General Anesthesia  [ x] Sedation: 0.5 mg versed, 25 mcg fentanyl  [ ] Spinal Anesthesia  [ x] Local/Regional    Contrast: None    Estimated Blood Loss: Minimal, < 5 cc    Condition:   [ ] Critical  [ ] Serious  [ ] Fair   [x ] Good    Findings/Follow up Plan of Care: Removal of chemotherapy port. Post procedure XR was unremarkable.    See full report in pacs    Specimens Removed: None    Implants: None    Complications: None    Disposition: Recovery, then home

## 2023-05-17 ENCOUNTER — APPOINTMENT (OUTPATIENT)
Dept: INFUSION THERAPY | Facility: CLINIC | Age: 73
End: 2023-05-17

## 2023-05-25 ENCOUNTER — APPOINTMENT (OUTPATIENT)
Dept: SURGERY | Facility: CLINIC | Age: 73
End: 2023-05-25
Payer: MEDICARE

## 2023-05-25 VITALS
BODY MASS INDEX: 30.7 KG/M2 | SYSTOLIC BLOOD PRESSURE: 144 MMHG | DIASTOLIC BLOOD PRESSURE: 92 MMHG | HEART RATE: 102 BPM | HEIGHT: 66 IN | TEMPERATURE: 97.3 F | OXYGEN SATURATION: 99 % | WEIGHT: 191 LBS

## 2023-05-25 PROCEDURE — 99212 OFFICE O/P EST SF 10 MIN: CPT

## 2023-05-25 NOTE — PHYSICAL EXAM
[de-identified] :  the patient looks and feels well, she appears healthy and her alopecia has resolved. [de-identified] :   No adenopathy [de-identified] :  right mastectomy site is clean and dry, no evidence of infection or local recurrence.  There is hyperpigmentation of the radiation field as expected.  A small area of desquamation 1 cm in diameter is noted in the lower outer area but is not problematic.  Left breast reveals no new masses or suspicious areas, no nipple discharge or suspicious skin changes.  No axillary adenopathy bilaterally, no right upper extremity lymphedema

## 2023-05-25 NOTE — HISTORY OF PRESENT ILLNESS
[de-identified] : The patient returns with her  for her scheduled breast cancer surveillance examination.  She reports a negative colonoscopy recently by Dr. Bean, and completed her right chest wall radiotherapy in March.  She looks and feels well and continues to follow regularly with Dr. Fritz also.\par \par Right MRM and excision of left breast radial scar December 2022 with postoperative RT as noted above.  She had a small area of residual disease in the right breast and 3 of 28 lymph nodes were positive, following neoadjuvant chemotherapy via Dr. AKUA MARCOS.    Her Port-A-Cath was removed recently by interventional radiology.

## 2023-05-25 NOTE — ASSESSMENT
[FreeTextEntry1] :  the patient has done very well after all of her treatment regimens and appears healthy.  Clinically no evidence of recurrent disease, no new lesions in the left breast.  She will return in about 3 to 4 months for reexam or sooner as needed local care instructions were given for the area of desquamation on the right chest wall.  All of their questions were answered and they are happy with the assessment and plan.

## 2023-06-02 LAB
ALBUMIN SERPL ELPH-MCNC: 4.1 G/DL
ALP BLD-CCNC: 84 U/L
ALT SERPL-CCNC: 13 U/L
ANION GAP SERPL CALC-SCNC: 13 MMOL/L
AST SERPL-CCNC: 18 U/L
BILIRUB SERPL-MCNC: 0.5 MG/DL
BUN SERPL-MCNC: 16 MG/DL
CALCIUM SERPL-MCNC: 9.5 MG/DL
CANCER AG15-3 SERPL-ACNC: 20 U/ML
CEA SERPL-MCNC: 2.9 NG/ML
CHLORIDE SERPL-SCNC: 103 MMOL/L
CO2 SERPL-SCNC: 24 MMOL/L
CREAT SERPL-MCNC: 0.8 MG/DL
EGFR: 78 ML/MIN/1.73M2
GLUCOSE SERPL-MCNC: 99 MG/DL
HCT VFR BLD CALC: 43.5 %
HGB BLD-MCNC: 14.9 G/DL
MCHC RBC-ENTMCNC: 29.5 PG
MCHC RBC-ENTMCNC: 34.3 G/DL
MCV RBC AUTO: 86.1 FL
PLATELET # BLD AUTO: 195 K/UL
PMV BLD: 9.9 FL
POTASSIUM SERPL-SCNC: 3.8 MMOL/L
PROT SERPL-MCNC: 6.4 G/DL
RBC # BLD: 5.05 M/UL
RBC # FLD: 13.2 %
SODIUM SERPL-SCNC: 140 MMOL/L
WBC # FLD AUTO: 5.6 K/UL

## 2023-06-14 ENCOUNTER — APPOINTMENT (OUTPATIENT)
Dept: INFUSION THERAPY | Facility: CLINIC | Age: 73
End: 2023-06-14
Payer: MEDICARE

## 2023-06-14 ENCOUNTER — OUTPATIENT (OUTPATIENT)
Dept: OUTPATIENT SERVICES | Facility: HOSPITAL | Age: 73
LOS: 1 days | End: 2023-06-14
Payer: MEDICARE

## 2023-06-14 ENCOUNTER — LABORATORY RESULT (OUTPATIENT)
Age: 73
End: 2023-06-14

## 2023-06-14 ENCOUNTER — APPOINTMENT (OUTPATIENT)
Dept: HEMATOLOGY ONCOLOGY | Facility: CLINIC | Age: 73
End: 2023-06-14
Payer: MEDICARE

## 2023-06-14 VITALS
HEIGHT: 66 IN | BODY MASS INDEX: 30.86 KG/M2 | SYSTOLIC BLOOD PRESSURE: 148 MMHG | DIASTOLIC BLOOD PRESSURE: 86 MMHG | HEART RATE: 99 BPM | WEIGHT: 192 LBS | RESPIRATION RATE: 18 BRPM

## 2023-06-14 DIAGNOSIS — Z90.49 ACQUIRED ABSENCE OF OTHER SPECIFIED PARTS OF DIGESTIVE TRACT: Chronic | ICD-10-CM

## 2023-06-14 DIAGNOSIS — C50.919 MALIGNANT NEOPLASM OF UNSPECIFIED SITE OF UNSPECIFIED FEMALE BREAST: ICD-10-CM

## 2023-06-14 DIAGNOSIS — Z90.11 ACQUIRED ABSENCE OF RIGHT BREAST AND NIPPLE: Chronic | ICD-10-CM

## 2023-06-14 DIAGNOSIS — R23.4 CHANGES IN SKIN TEXTURE: ICD-10-CM

## 2023-06-14 DIAGNOSIS — K76.9 LIVER DISEASE, UNSPECIFIED: ICD-10-CM

## 2023-06-14 DIAGNOSIS — Z95.828 PRESENCE OF OTHER VASCULAR IMPLANTS AND GRAFTS: Chronic | ICD-10-CM

## 2023-06-14 PROCEDURE — 99214 OFFICE O/P EST MOD 30 MIN: CPT

## 2023-06-14 PROCEDURE — 80076 HEPATIC FUNCTION PANEL: CPT

## 2023-06-14 PROCEDURE — 80048 BASIC METABOLIC PNL TOTAL CA: CPT

## 2023-06-14 PROCEDURE — 85027 COMPLETE CBC AUTOMATED: CPT

## 2023-06-14 PROCEDURE — 86300 IMMUNOASSAY TUMOR CA 15-3: CPT

## 2023-06-14 PROCEDURE — 82378 CARCINOEMBRYONIC ANTIGEN: CPT

## 2023-06-14 NOTE — RESULTS/DATA
[FreeTextEntry1] : Pathology report from 22:\par Accession:                             [id number]\par \par Collected Date/Time:                   2022 15:48 EST\par Received Date/Time:                    2022 16:23 EST\par \par Addendum Report - Auth (Verified)\par \par Addendum\par 3) HER2 IHC Analysis was performed at [facility].\par Their report is attached below.\par ----------------------------------------------------------\par [facility]\par [address]\par [place], [place]  [address]\par [phone]\par \par INTEGRATED SPECIMEN#: [id number]\par INTEGRATED CASE#: [id number]\par DATE SIGNED OUT: 22 by Celina Pagan M.D. Ph.D\par HOSPITAL ID#: [id number]\par \par BODY SITE: Breast, right\par \par CLINICAL DATA:  Colloid carcinoma, S/P neoadjuvant chemotherapy\par \par MARKER               RESULTS            INTERPRETATION\par HER2 IHC                  0\par Negative\par \par Fixation Information :\par \par Fixative: Formalin\par Time of Fixation: <1 hour\par Duration of Fixation: >9 hour(s)\par Note:  Time to fixation = cold ischemic time\par \par HER2 IHC Results :\par Complete membrane stainin%\par Uniform staining: Absent\par Homogeneous, dark circumferential pattern: Absent\par Sample Adequate for analysis: Yes\par \par This interpretation or diagnosis is contingent on the specimen and\par the clinical information received. This analysis is an adjunct to the\par evaluation of the referring physician and does not represent a final\par diagnosis.\par \par This test was developed and its performance characteristics have been\par determined by [facility].  It has not been\par cleared or approved by the FDA.  The FDA has determined that such\par clearance or approval is not necessary. The laboratory is regulated under\par the Clinical Laboratory Improvement Amendment of 1988 (CLIA) as qualified\par to perform high complexity testing.\par \par [facility] is a business unit of [employer], a wholly-owned subsidiary of [employer].\par \par Complete report is available in the electronic medical record as a\par PATHOLOGY CONSULT REPORT\par \par Verified by: Fabien Elias M.D.\par (Electronic Signature)\par Reported on: 22 12:18 EST, [facility],\par [address], [place] [address]\par Phone: [phone]   Fax: [phone]\par _________________________________________________________________\par Surgical Pathology Report - Auth (Verified)\par \par Specimen(s) Submitted\par  E 1  Left breast core biopsy anterior\par Time obtained: 3:48 pm\par Cold ischemic time: <1 hour\par 2  Left breast core biopsy posterior\par Time obtained: 3:52 pm\par Cold ischemic time: <1 hour\par 3  Right breast\par Time obtained: 5:33 pm\par Cold ischemic time: <1 hour\par 4  Right axillary fat pad\par Time obtained: 5:38 pm\par Cold ischemic time: <1 hour\par \par \par Final Diagnosis\par 1.  Breast, left anterior core biopsy site, radiofrequency seed localized\par lumpectomy:\par - Radial sclerosing lesion (radial scar) located adjacent to a healing\par prior biopsy site.\par - Benign atrophic breast tissue with fibrocystic changes, both\par proliferative and non-proliferative types, associated with numerous\par microcalcifications.\par - Focal mammary duct ectasia.\par \par 2.  Breast, left posterior core biopsy site, radiofrequency seed localized\par lumpectomy:\par - Radial sclerosing lesion (radial scar).\par - Benign atrophic breast tissue with fibrocystic changes, both\par proliferative and non-proliferative types, associated with\par microcalcifications.\par - Healing prior biopsy site.\par \par 3.  Breast, right, modified radical mastectomy:\par - Large fibrous tumor bed in the upper inner and upper outer quadrants\par with necrosis, chronic inflammation, coarse stromal calcifications, and\par a remote prior biopsy site along with abundant acellular mucin except\par for a 1.4 cm (microscopic measurements) focus of residual invasive well\par differentiated ductal carcinoma (colloid carcinoma).\par - No intraductal component nor lymphovascular invasion is seen.\par - Atrophic fatty breast tissue with a radial sclerosing lesion\par (radial scar), proliferative type fibrocystic changes associated with\par microcalcifications, and diffuse histologic changes consistent with prior\par systemic therapy effect.\par - Unremarkable nipple, skin, and deep fascial margin.  Negative for\par carcinoma.\par - Pending special studies for Her2/SIRI oncoprotein expression and/or gene\par amplification, with the results to follow and be reported as a separate\par addendum.\par

## 2023-06-14 NOTE — ASSESSMENT
[FreeTextEntry1] : 73 yo female has synchronous colon cancer and breast cancer:\par - Invasive moderately differentiated adenocarcinoma of the sigmoid colon, MMR proficient, s/p robotic sigmoidectomy and colostomy. 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 Clinical stage is cT3N1.\par \par Assessment and Plan:\par #  Invasive well to moderately differentiated ductal carcinoma of the right breast, ER positive, CA and Her-2 negative, clinical stage cT3N1.\par -- Completed  AC x 4 and weekly Taxol x 12. \par -- S/P  right MRM and left breast lumpectomy on 12/8/22. The right mastectomy showed 1.4 cm residual invasive well differentiated ductal carcinoma (colloid carcinoma), %, CA neg, KI 67 2% and Her-2 negative. There are metastatic carcinoma present in three of twenty-eight axillary lymph nodes (3/28), the largest focus of which measures 3.5 mm, +JESSICA.  AJCC 8th Edition Pathologic Stage: ypT1c, ypN1a, ypMx, ypPR (pathologic partial response to systemic neoadjuvant chemotherapy). \par -- S/P PMRT completed  treatment  on 03/01/2023. Followup with Dr. Miller.\par -- Continue Letrozole daily ( Start date 09/2022) \par -- Restaging CT C/A/P in 4/2023 showed stable pulmonary nodules, largest within the right lower lobe measuring up to 4 mm.  No definite sites of abdominopelvic metastatic disease. Previously described indeterminate hyperenhancing liver segment VIII lesion is less conspicuous, and may have decreased in size, now measuring up to 1 cm, previously 1.5 cm. Will order f/u CT in 9/20203. \par -- Screening mammo of the left breast showed Postsurgical changes of the left breast. Stable benign masses. \par -- Right chest wall skin desquamation, she saw Dr Vance who recommend to apply Neosporin to area. \par -- Bone density was normal. Advised to continue calcium and vitamin D and  regular exercise\par -- Port removed 5/2023.\par -- Order blood work: CBC, BMP, LFT, CEA, .\par \par # IIA ([id number]) adenocarcinoma of the sigmoid colon, MMR proficient, s/p robotic sigmoidectomy. \par -- Follow up with GI. Up to date with surveillance colonoscopy 4/2023. Next due in 3 years. \par \par -- RTC  3 months \par \par Case was seen and discussed with Dr. Daniel who agreed with the assessment and plan.\par \par

## 2023-06-14 NOTE — HISTORY OF PRESENT ILLNESS
[de-identified] : 70 yo female was referred by Dr. Quinonez for consultation of breast cancer. Jesica initially presented to hospital for constipation and abdominal pain. CT a/p on 11/24/21 showed  large bowel obstruction secondary to a likely malignant stricture in the proximal sigmoid colon, small volume ascites and lobulated, partially visualized right breast mass and left breast nodule. Physical exam correlation and correlation with dedicated breast imaging was recommended as malignancy is of concern.\par \par On 11/26/21, CT chest showed right and left breast masses. The right breast mass measured 6.5 x 3.8 x 7.0 cm and contained small calcifications. The left breast mass measured 3.1 x 2.9 x 2.6 cm.\par \par On 11/29/21, colonoscopy revealed sigmoid colon mass and and biopsy showed invasive adenocarcinoma, moderately differentiated. MMR proteins were all expressed. 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 \par \par On 12/21/21, b/l breast dx mammo and US were performed which showed 10 cm mass at the 11 to 1:00 position 3 to 4 cm from the nipple corresponding to the biopsy-proven right breast carcinoma.  In the right axilla, at the 11:00 position 13 cm from the nipple, there is an abnormal lymph node measuring 2.3 x 1.3 x 0.9 cm. Ultrasound-guided biopsy is recommended. In the left breast, there are scattered similar appearing subcentimeter circumscribed masses seen throughout the left breast. These correspond to mammographically demonstrated masses:\par At the 3:00 position 6 cm from the nipple, there is a mass measuring 0.4 x 0.3 x 0.3 cm. Ultrasound-guided biopsy of this mass is recommended.\par At the 7 to 8:00 position 5 cm from the nipple, there is a mass measuring 0.4 x 0.3 x 0.2 cm.\par At the 8 to 9:00 position 4 cm from the nipple, there is a circumscribed mass measuring 0.4 x 0.4 x 0.3 cm.\par At the 8 to 9:00 position 3 cm from nipple, corresponding to area palpable concern and mammographic findings, there is a heterogeneous mass measuring 2.5 x 2.3 x 2.0 cm. Ultrasound-guided biopsy is recommended.\par \par On 12/30/21, left breast 3 N6 mass, ultrasound guided needle core biopsies revealed radial sclerosing lesion (radial scar) and Benign atrophic breast tissue with proliferative type fibrocystic changes. Left breast 8-9 N3 mass, ultrasound guided needle core biopsies showed single minute fragment of benign atrophic breast tissue.  \par The right axillary mass, ultrasound guided needle core biopsies were positive for moderately differentiated adenocarcinoma, most likely representative of metastasis from the patient's known mammary primary carcinoma to an axillary lymph node with mervat replacement, obliteration, and extensive extracapsular extension.\par \par On 1/19/22, left breast relatively posterior calcifications, stereotactic guided vacuum assisted needle core biopsies showed radial sclerosing lesion (radial scar) and benign atrophic breast tissue with proliferative type fibrocystic changes. Left relatively anterior calcifications, stereotactic guided vacuum assisted needle core biopsies showed benign atrophic breast tissue with proliferative type fibrocystic changes.  \par \par On 1/5/22, she had b/l breast MRI which showed 8 cm biopsy-proven right breast index carcinoma and axillary mervat metastasis. Biopsy-proven left breast radial scar with no additional sites of suspicious enhancement.\par \par On 2/17/22, she underwent robotic sigmoidectomy and colostomy. The pathology revealed Invasive moderately differentiated adenocarcinoma (6 cm in greatest dimension), invading through muscularis propria into pericolorectal tissue (pT3), +LVI.  All margins were negative for invasive carcinoma.  Twenty two lymph nodes were negative for metastatic carcinoma. She recovered well from surgery. \par \par She is here today with her  to discuss breast cancer treatment. She has no family h/o breast cancer or any other kinds of cancer.  [de-identified] : 4/21/22:susanne Mooney is here today for followup and chemotherapy. She has synchronous colon cancer and breast cancer: invasive moderately differentiated adenocarcinoma of the sigmoid colon, MMR proficient, s/p robotic sigmoidectomy and colostomy. 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 Clinical stage is cT3N1. Her staging CT and bone scan did not show evidence of distant mets. She is here today to start neoadjuvant chemotherapy for breast cancer. \par \par 5/9/22:susanne Mooney is here today for followup and chemotherapy. She has synchronous colon cancer and breast cancer: invasive moderately differentiated adenocarcinoma of the sigmoid colon, MMR proficient, s/p robotic sigmoidectomy and colostomy. 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 Clinical stage is cT3N1. Her staging CT and bone scan did not show evidence of distant mets. She started on neoadjuvant chemotherapy for breast cancer. She had 1st cycle AC 2 weeks ago and tolerated well. \par \par 5/23/22susanne Mooney is here today for followup and chemotherapy. She has synchronous colon cancer and breast cancer: invasive moderately differentiated adenocarcinoma of the sigmoid colon, MMR proficient, s/p robotic sigmoidectomy and colostomy. 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 Clinical stage is cT3N1. Her staging CT and bone scan did not show evidence of distant mets. She started on neoadjuvant chemotherapy. To date, she has had 2 cycles and tolerated well. She did not have fever, mouth sore, vomiting or diarrhea. \par \par 6/6/22susanne Mooney is here today for followup and chemotherapy. She has synchronous colon cancer and breast cancer: invasive moderately differentiated adenocarcinoma of the sigmoid colon, MMR proficient, s/p robotic sigmoidectomy and colostomy. 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 Clinical stage is cT3N1. Her staging CT and bone scan did not show evidence of distant mets. She has been on neoadjuvant chemotherapy with dose dense AC. To date, she has had 3 cycles and tolerated well. She did not have fever, mouth sore, vomiting or diarrhea. She does endorse some burning/tearing sensation in the eye. \par \par 6/20/22:susanne Mooney is here today for followup and chemotherapy. She has synchronous colon cancer and breast cancer: invasive moderately differentiated adenocarcinoma of the sigmoid colon, MMR proficient, s/p robotic sigmoidectomy and colostomy. 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 Clinical stage is cT3N1. Her staging CT and bone scan did not show evidence of distant mets. She has been on neoadjuvant chemotherapy. She completed dose dense AC x 4. She feels more fatigue. Her eye burning is getting better.\par \par 7/11/22susanne Mooney is here for follow up and chemotherapy.  She has synchronous colon cancer and breast cancer: invasive moderately differentiated adenocarcinoma of the sigmoid colon, MMR proficient, s/p robotic sigmoidectomy and colostomy. 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 Clinical stage is cT3N1. Her staging CT and bone scan did not show evidence of distant mets. She has been on neoadjuvant chemotherapy. She completed dose dense AC x 4. She completed 3 doses of weekly Taxol so far. She feels well. Her energy level is better and burning of the eyes is resolved. She followed up with [facility] and they want to wait until Taxol is finished for her chemotherapy.\par \par 09/7/22susanne Mooney is here for follow up and chemotherapy.  She has synchronous colon cancer and breast cancer: invasive moderately differentiated adenocarcinoma of the sigmoid colon, MMR proficient, s/p robotic sigmoidectomy and colostomy. 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 Clinical stage is cT3N1. Her staging CT and bone scan did not show evidence of distant mets. She has been on neoadjuvant chemotherapy. She completed dose dense AC x 4. Currently, she is on weekly Taxol. To date, she has had 11 treatments. She tolerated chemotherapy well. \par \par 10/5/22\svitlana Mooney is here for follow up. She has synchronous colon cancer and breast cancer: invasive moderately differentiated adenocarcinoma of the sigmoid colon, MMR proficient, s/p robotic sigmoidectomy and colostomy. 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 Clinical stage is cT3N1. Her staging CT and bone scan did not show evidence of distant mets. She completed neoadjuvant chemotherapy with dose dense AC x 4 and weekly Taxol x 12. She started letrozole on 9/15 and tolerating well. She had breast MRI 9/22 showed biopsy-proven malignant right breast mass with associated skin thickening. Interval reduction in size of prior noted prominent right axillary lymph node likely treatment related. No MRI evidence of malignancy in the left breast. She also had restaging CT C/A/P which showed Unchanged bilateral few pulmonary nodules measuring up to 4 mm. Interval decrease in size of previously enlarged right axillary lymph node. Indeterminate hyperenhancing 1.5 cm right hepatic lobe lesion. MRI abdomen on 9/25/22 showed Hyperenhancing right hepatic lobe lesion, demonstrating restricted diffusion, indeterminate. Metastasis is on differential. She is pending biopsy of liver lesion. \par \par 11/2/22:susanne Mooney is here for follow up. She has synchronous colon cancer and breast cancer: invasive moderately differentiated adenocarcinoma of the sigmoid colon, MMR proficient, s/p robotic sigmoidectomy and colostomy. 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 Clinical stage is cT3N1. She completed neoadjuvant chemotherapy with dose dense AC x 4 and weekly Taxol x 12. She started letrozole on 9/15 and tolerating well.  She had restaging CT C/A/P which showed Unchanged bilateral few pulmonary nodules measuring up to 4 mm. Interval decrease in size of previously enlarged right axillary lymph node. Indeterminate hyperenhancing 1.5 cm right hepatic lobe lesion. MRI abdomen on 9/25/22 showed Hyperenhancing right hepatic lobe lesion, demonstrating restricted diffusion, indeterminate. On 10/13/22, she had CT guided bx of the right liver lesion which is Negative for malignancy. The pathology showed focally distorted hepatic parenchyma. Differential diagnosis include nodular regenerative hyperplasia, focal nodular hyperplasia and hepatocellular adenoma. \par \par \par 12/28/22:\par Jesica is here for follow up. She has synchronous colon cancer and breast cancer: invasive moderately differentiated adenocarcinoma of the sigmoid colon, MMR proficient, s/p robotic sigmoidectomy and colostomy. \par  Patient reports feeling well, Right breast healing very well, no discharges.  \par On 12/8/22 S/P Right Modified radical mastectomy. left breast lumpectomy \par Post surgical Pathology report :\par Final Diagnosis\par 1.  Breast, left anterior core biopsy site, radiofrequency seed localized\par lumpectomy:\par - Radial sclerosing lesion (radial scar) located adjacent to a healing\par prior biopsy site.\par - Benign atrophic breast tissue with fibrocystic changes, both\par proliferative and non-proliferative types, associated with numerous\par microcalcifications.\par \par 2.  Breast, left posterior core biopsy site, radiofrequency seed localized\par lumpectomy:\par - Radial sclerosing lesion (radial scar).\par - Benign atrophic breast tissue with fibrocystic changes, both\par proliferative and non-proliferative types, associated with\par microcalcifications.\par - Healing prior biopsy site.\par \par 01/25/2023:\par Jesica is here for follow up. She has synchronous colon cancer and breast cancer, stage is IIA ([id number]) invasive moderately differentiated adenocarcinoma of the sigmoid colon, MMR proficient, s/p robotic sigmoidectomy and colostomy, and invasive well to moderately differentiated ductal carcinoma of the right breast ER/ID positive and Her-2 negative, Clinical stage is cT3N1, s/p neoadjuvant chemo with AC-T followed right MRM and left breast lumpectomy on 12/8/22. The right mastectomy showed 1.4 cm residual invasive well differentiated ductal carcinoma (colloid carcinoma), %, ID neg, KI 67 2% and Her-2 negative. There are metastatic carcinoma present in three of twenty-eight axillary lymph nodes (3/28), the largest focus of which measures 3.5 mm, +JESSICA.  AJCC 8th Edition Pathologic Stage: ypT1c, ypN1a, ypMx, ypPR (pathologic partial response to systemic neoadjuvant chemotherapy). She has been taking Letrozole daily and tolerated well. She saw Dr. Miller and started on PMRT. \par \par 03/22/2023susanne Moonye is here for follow up. She has synchronous colon cancer and breast cancer, stage is IIA ([id number]) invasive moderately differentiated adenocarcinoma of the sigmoid colon, MMR proficient, s/p robotic sigmoidectomy and colostomy, and invasive well to moderately differentiated ductal carcinoma of the right breast ER/ID positive and Her-2 negative, Clinical stage is cT3N1, s/p neoadjuvant chemo with AC-T followed right MRM and left breast lumpectomy on 12/8/22. The right mastectomy showed 1.4 cm residual invasive well differentiated ductal carcinoma (colloid carcinoma), %, ID neg, KI 67 2% and Her-2 negative. three of twenty-eight axillary lymph nodes (3/28) were positive, +JESSICA.  AJCC 8th Edition Pathologic Stage: ypT1c, ypN1a, ypMx, ypPR. She has been taking Letrozole daily and tolerated well. She saw Dr. Miller and completed PMRT on 03/01/2023. She still has skin reaction at radiation site. \par \par 06/14/2023susanne Mooney is here for follow up. She has synchronous colon cancer and breast cancer, stage is IIA (sT3P6F1) invasive moderately differentiated adenocarcinoma of the sigmoid colon, MMR proficient, s/p robotic sigmoidectomy and colostomy, and invasive well to moderately differentiated ductal carcinoma of the right breast ER/ID positive and Her-2 negative, Clinical stage is cT3N1, s/p neoadjuvant chemo with AC-T followed right MRM and left breast lumpectomy on 12/8/22. The right mastectomy showed 1.4 cm residual invasive well differentiated ductal carcinoma (colloid carcinoma), %, ID neg, KI 67 2% and Her-2 negative. three of twenty-eight axillary lymph nodes (3/28) were positive, +JESSICA.  AJCC 8th Edition Pathologic Stage: ypT1c, ypN1a, ypMx, ypPR. She has been taking Letrozole daily since 9/2022 and tolerated well. She saw Dr. Miller and completed PMRT on 03/01/2023. She had CT CAP in 4/2023 which showed stable pulmonary nodules, largest within the right lower lobe measuring up to 4 mm.  No definite sites of abdominopelvic metastatic disease. Previously described indeterminate hyperenhancing liver segment VIII lesion is less conspicuous, and may have decreased in size, now measuring up to 1 cm, previously 1.5 cm. She had Left breast mammogram 4/2023 Postsurgical changes of the left breast. Stable benign masses. She had surveillance colonoscopy in 4/2023 polypectomy which was benign, next colonoscopy due in 3 years. She had port removed 5/2023.\par

## 2023-06-14 NOTE — PHYSICAL EXAM
[Fully active, able to carry on all pre-disease performance without restriction] : Status 0 - Fully active, able to carry on all pre-disease performance without restriction [Normal] : affect appropriate [de-identified] : right breast s/p mastectomy, s/p radiation with skin reaction. Has area of desquamation to right chest wall. No palpable abnormality in the left breast.

## 2023-06-15 DIAGNOSIS — C50.911 MALIGNANT NEOPLASM OF UNSPECIFIED SITE OF RIGHT FEMALE BREAST: ICD-10-CM

## 2023-06-15 DIAGNOSIS — Z51.81 ENCOUNTER FOR THERAPEUTIC DRUG LEVEL MONITORING: ICD-10-CM

## 2023-06-15 DIAGNOSIS — C50.919 MALIGNANT NEOPLASM OF UNSPECIFIED SITE OF UNSPECIFIED FEMALE BREAST: ICD-10-CM

## 2023-06-15 DIAGNOSIS — K76.9 LIVER DISEASE, UNSPECIFIED: ICD-10-CM

## 2023-06-15 DIAGNOSIS — C18.7 MALIGNANT NEOPLASM OF SIGMOID COLON: ICD-10-CM

## 2023-07-28 ENCOUNTER — APPOINTMENT (OUTPATIENT)
Dept: CARDIOLOGY | Facility: CLINIC | Age: 73
End: 2023-07-28
Payer: MEDICARE

## 2023-07-28 VITALS — SYSTOLIC BLOOD PRESSURE: 130 MMHG | DIASTOLIC BLOOD PRESSURE: 80 MMHG | RESPIRATION RATE: 18 BRPM

## 2023-07-28 VITALS — HEIGHT: 66 IN | HEART RATE: 87 BPM | BODY MASS INDEX: 31.5 KG/M2 | WEIGHT: 196 LBS

## 2023-07-28 PROCEDURE — 99214 OFFICE O/P EST MOD 30 MIN: CPT

## 2023-07-28 PROCEDURE — 93000 ELECTROCARDIOGRAM COMPLETE: CPT

## 2023-07-28 RX ORDER — CHLORTHALIDONE 25 MG/1
25 TABLET ORAL DAILY
Qty: 90 | Refills: 3 | Status: ACTIVE | COMMUNITY
Start: 1900-01-01 | End: 1900-01-01

## 2023-07-28 NOTE — PHYSICAL EXAM

## 2023-09-13 ENCOUNTER — APPOINTMENT (OUTPATIENT)
Dept: SURGERY | Facility: CLINIC | Age: 73
End: 2023-09-13
Payer: MEDICARE

## 2023-09-13 VITALS
DIASTOLIC BLOOD PRESSURE: 80 MMHG | HEART RATE: 86 BPM | TEMPERATURE: 97.2 F | BODY MASS INDEX: 31.34 KG/M2 | SYSTOLIC BLOOD PRESSURE: 124 MMHG | OXYGEN SATURATION: 96 % | HEIGHT: 66 IN | WEIGHT: 195 LBS

## 2023-09-13 PROCEDURE — 99213 OFFICE O/P EST LOW 20 MIN: CPT

## 2023-09-18 ENCOUNTER — APPOINTMENT (OUTPATIENT)
Dept: SURGERY | Facility: CLINIC | Age: 73
End: 2023-09-18
Payer: MEDICARE

## 2023-09-18 VITALS
DIASTOLIC BLOOD PRESSURE: 80 MMHG | BODY MASS INDEX: 31.34 KG/M2 | TEMPERATURE: 97 F | HEART RATE: 124 BPM | HEIGHT: 66 IN | WEIGHT: 195 LBS | SYSTOLIC BLOOD PRESSURE: 130 MMHG | OXYGEN SATURATION: 97 %

## 2023-09-18 PROCEDURE — 99212 OFFICE O/P EST SF 10 MIN: CPT

## 2023-09-20 ENCOUNTER — APPOINTMENT (OUTPATIENT)
Dept: HEMATOLOGY ONCOLOGY | Facility: CLINIC | Age: 73
End: 2023-09-20
Payer: MEDICARE

## 2023-09-20 ENCOUNTER — LABORATORY RESULT (OUTPATIENT)
Age: 73
End: 2023-09-20

## 2023-09-20 ENCOUNTER — OUTPATIENT (OUTPATIENT)
Dept: OUTPATIENT SERVICES | Facility: HOSPITAL | Age: 73
LOS: 1 days | End: 2023-09-20
Payer: MEDICARE

## 2023-09-20 VITALS
HEART RATE: 98 BPM | WEIGHT: 195 LBS | BODY MASS INDEX: 31.34 KG/M2 | DIASTOLIC BLOOD PRESSURE: 84 MMHG | HEIGHT: 66 IN | SYSTOLIC BLOOD PRESSURE: 140 MMHG

## 2023-09-20 DIAGNOSIS — C18.7 MALIGNANT NEOPLASM OF SIGMOID COLON: ICD-10-CM

## 2023-09-20 DIAGNOSIS — Z90.49 ACQUIRED ABSENCE OF OTHER SPECIFIED PARTS OF DIGESTIVE TRACT: Chronic | ICD-10-CM

## 2023-09-20 DIAGNOSIS — Z90.11 ACQUIRED ABSENCE OF RIGHT BREAST AND NIPPLE: Chronic | ICD-10-CM

## 2023-09-20 DIAGNOSIS — Z95.828 PRESENCE OF OTHER VASCULAR IMPLANTS AND GRAFTS: Chronic | ICD-10-CM

## 2023-09-20 DIAGNOSIS — C50.911 MALIGNANT NEOPLASM OF UNSPECIFIED SITE OF RIGHT FEMALE BREAST: ICD-10-CM

## 2023-09-20 DIAGNOSIS — Z51.81 ENCOUNTER FOR THERAPEUTIC DRUG LEVEL MONITORING: ICD-10-CM

## 2023-09-20 DIAGNOSIS — C50.919 MALIGNANT NEOPLASM OF UNSPECIFIED SITE OF UNSPECIFIED FEMALE BREAST: ICD-10-CM

## 2023-09-20 DIAGNOSIS — K76.9 LIVER DISEASE, UNSPECIFIED: ICD-10-CM

## 2023-09-20 PROCEDURE — 82378 CARCINOEMBRYONIC ANTIGEN: CPT

## 2023-09-20 PROCEDURE — 86300 IMMUNOASSAY TUMOR CA 15-3: CPT

## 2023-09-20 PROCEDURE — 80048 BASIC METABOLIC PNL TOTAL CA: CPT

## 2023-09-20 PROCEDURE — 99214 OFFICE O/P EST MOD 30 MIN: CPT

## 2023-09-20 PROCEDURE — 85027 COMPLETE CBC AUTOMATED: CPT

## 2023-09-20 PROCEDURE — 80076 HEPATIC FUNCTION PANEL: CPT

## 2023-09-24 PROBLEM — C18.7 PRIMARY MALIGNANT NEOPLASM OF SIGMOID COLON: Status: ACTIVE | Noted: 2021-12-06

## 2023-10-05 ENCOUNTER — OUTPATIENT (OUTPATIENT)
Dept: OUTPATIENT SERVICES | Facility: HOSPITAL | Age: 73
LOS: 1 days | End: 2023-10-05
Payer: MEDICARE

## 2023-10-05 ENCOUNTER — APPOINTMENT (OUTPATIENT)
Dept: RADIATION ONCOLOGY | Facility: HOSPITAL | Age: 73
End: 2023-10-05

## 2023-10-05 ENCOUNTER — APPOINTMENT (OUTPATIENT)
Dept: RADIATION ONCOLOGY | Facility: HOSPITAL | Age: 73
End: 2023-10-05
Payer: MEDICARE

## 2023-10-05 VITALS
RESPIRATION RATE: 16 BRPM | SYSTOLIC BLOOD PRESSURE: 152 MMHG | HEART RATE: 96 BPM | BODY MASS INDEX: 31.68 KG/M2 | WEIGHT: 196.25 LBS | TEMPERATURE: 98.1 F | DIASTOLIC BLOOD PRESSURE: 86 MMHG | OXYGEN SATURATION: 96 %

## 2023-10-05 DIAGNOSIS — Z90.49 ACQUIRED ABSENCE OF OTHER SPECIFIED PARTS OF DIGESTIVE TRACT: Chronic | ICD-10-CM

## 2023-10-05 DIAGNOSIS — Z90.11 ACQUIRED ABSENCE OF RIGHT BREAST AND NIPPLE: Chronic | ICD-10-CM

## 2023-10-05 DIAGNOSIS — Z95.828 PRESENCE OF OTHER VASCULAR IMPLANTS AND GRAFTS: Chronic | ICD-10-CM

## 2023-10-05 DIAGNOSIS — C50.911 MALIGNANT NEOPLASM OF UNSPECIFIED SITE OF RIGHT FEMALE BREAST: ICD-10-CM

## 2023-10-05 PROCEDURE — 99212 OFFICE O/P EST SF 10 MIN: CPT

## 2023-10-05 RX ORDER — LEUCOVORIN CALCIUM 10 MG/ML
10 INJECTION INTRAMUSCULAR; INTRAVENOUS
Refills: 0 | Status: ACTIVE | COMMUNITY

## 2023-10-05 RX ORDER — UBIDECARENONE/VIT E ACET 100MG-5
CAPSULE ORAL
Refills: 0 | Status: ACTIVE | COMMUNITY

## 2023-10-09 ENCOUNTER — RESULT REVIEW (OUTPATIENT)
Age: 73
End: 2023-10-09

## 2023-10-09 ENCOUNTER — OUTPATIENT (OUTPATIENT)
Dept: OUTPATIENT SERVICES | Facility: HOSPITAL | Age: 73
LOS: 1 days | End: 2023-10-09
Payer: MEDICARE

## 2023-10-09 DIAGNOSIS — K76.9 LIVER DISEASE, UNSPECIFIED: ICD-10-CM

## 2023-10-09 DIAGNOSIS — Z90.49 ACQUIRED ABSENCE OF OTHER SPECIFIED PARTS OF DIGESTIVE TRACT: Chronic | ICD-10-CM

## 2023-10-09 DIAGNOSIS — Z90.11 ACQUIRED ABSENCE OF RIGHT BREAST AND NIPPLE: Chronic | ICD-10-CM

## 2023-10-09 DIAGNOSIS — R91.8 OTHER NONSPECIFIC ABNORMAL FINDING OF LUNG FIELD: ICD-10-CM

## 2023-10-09 DIAGNOSIS — Z95.828 PRESENCE OF OTHER VASCULAR IMPLANTS AND GRAFTS: Chronic | ICD-10-CM

## 2023-10-09 PROCEDURE — 71250 CT THORAX DX C-: CPT | Mod: 26,MH

## 2023-10-09 PROCEDURE — 71250 CT THORAX DX C-: CPT

## 2023-10-09 PROCEDURE — 74177 CT ABD & PELVIS W/CONTRAST: CPT

## 2023-10-09 PROCEDURE — 74177 CT ABD & PELVIS W/CONTRAST: CPT | Mod: 26,MH

## 2023-10-10 DIAGNOSIS — K76.9 LIVER DISEASE, UNSPECIFIED: ICD-10-CM

## 2023-10-10 DIAGNOSIS — R91.8 OTHER NONSPECIFIC ABNORMAL FINDING OF LUNG FIELD: ICD-10-CM

## 2023-10-10 DIAGNOSIS — C50.911 MALIGNANT NEOPLASM OF UNSPECIFIED SITE OF RIGHT FEMALE BREAST: ICD-10-CM

## 2023-10-23 ENCOUNTER — RX RENEWAL (OUTPATIENT)
Age: 73
End: 2023-10-23

## 2023-11-28 RX ORDER — AMLODIPINE BESYLATE 2.5 MG/1
2.5 TABLET ORAL
Qty: 90 | Refills: 2 | Status: ACTIVE | COMMUNITY
Start: 2022-11-17 | End: 1900-01-01

## 2023-11-30 RX ORDER — AMLODIPINE BESYLATE 5 MG/1
5 TABLET ORAL DAILY
Qty: 90 | Refills: 3 | Status: ACTIVE | COMMUNITY
Start: 2021-12-28 | End: 1900-01-01

## 2023-12-04 ENCOUNTER — RX RENEWAL (OUTPATIENT)
Age: 73
End: 2023-12-04

## 2023-12-20 ENCOUNTER — APPOINTMENT (OUTPATIENT)
Dept: HEMATOLOGY ONCOLOGY | Facility: CLINIC | Age: 73
End: 2023-12-20

## 2023-12-27 ENCOUNTER — LABORATORY RESULT (OUTPATIENT)
Age: 73
End: 2023-12-27

## 2023-12-27 ENCOUNTER — OUTPATIENT (OUTPATIENT)
Dept: OUTPATIENT SERVICES | Facility: HOSPITAL | Age: 73
LOS: 1 days | End: 2023-12-27
Payer: MEDICARE

## 2023-12-27 ENCOUNTER — APPOINTMENT (OUTPATIENT)
Dept: HEMATOLOGY ONCOLOGY | Facility: CLINIC | Age: 73
End: 2023-12-27
Payer: MEDICARE

## 2023-12-27 VITALS
DIASTOLIC BLOOD PRESSURE: 98 MMHG | HEART RATE: 100 BPM | OXYGEN SATURATION: 98 % | HEIGHT: 66 IN | WEIGHT: 196 LBS | SYSTOLIC BLOOD PRESSURE: 160 MMHG | BODY MASS INDEX: 31.5 KG/M2 | TEMPERATURE: 98.1 F | RESPIRATION RATE: 19 BRPM

## 2023-12-27 DIAGNOSIS — Z90.49 ACQUIRED ABSENCE OF OTHER SPECIFIED PARTS OF DIGESTIVE TRACT: Chronic | ICD-10-CM

## 2023-12-27 DIAGNOSIS — C50.919 MALIGNANT NEOPLASM OF UNSPECIFIED SITE OF UNSPECIFIED FEMALE BREAST: ICD-10-CM

## 2023-12-27 DIAGNOSIS — Z90.11 ACQUIRED ABSENCE OF RIGHT BREAST AND NIPPLE: Chronic | ICD-10-CM

## 2023-12-27 DIAGNOSIS — Z95.828 PRESENCE OF OTHER VASCULAR IMPLANTS AND GRAFTS: Chronic | ICD-10-CM

## 2023-12-27 PROCEDURE — 86300 IMMUNOASSAY TUMOR CA 15-3: CPT

## 2023-12-27 PROCEDURE — 85027 COMPLETE CBC AUTOMATED: CPT

## 2023-12-27 PROCEDURE — 80048 BASIC METABOLIC PNL TOTAL CA: CPT

## 2023-12-27 PROCEDURE — 99214 OFFICE O/P EST MOD 30 MIN: CPT

## 2023-12-27 PROCEDURE — 82378 CARCINOEMBRYONIC ANTIGEN: CPT

## 2023-12-27 PROCEDURE — 80076 HEPATIC FUNCTION PANEL: CPT

## 2023-12-27 RX ORDER — LETROZOLE TABLETS 2.5 MG/1
2.5 TABLET, FILM COATED ORAL
Qty: 90 | Refills: 3 | Status: ACTIVE | COMMUNITY
Start: 2022-09-07 | End: 1900-01-01

## 2023-12-27 NOTE — ASSESSMENT
[FreeTextEntry1] : 71 yo female has synchronous colon cancer and breast cancer: - Invasive moderately differentiated adenocarcinoma of the sigmoid colon, MMR proficient, s/p robotic sigmoidectomy and colostomy. The pathologic stage is IIA ([id number]). 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 Clinical stage is cT3N1.  Assessment and Plan: # Invasive well to moderately differentiated ductal carcinoma of the right breast, ER positive, MS and Her-2 negative, clinical stage cT3N1. -- S/P AC x 4 and weekly Taxol x 12. -- S/P right MRM and left breast lumpectomy on 12/8/22. The right mastectomy showed 1.4 cm residual invasive well differentiated ductal carcinoma (colloid carcinoma), %, MS neg, KI 67 2% and Her-2 negative. There are metastatic carcinoma present in three of twenty-eight axillary lymph nodes (3/28), the largest focus of which measures 3.5 mm, +JESSICA. AJCC 8th Edition Pathologic Stage: ypT1c, ypN1a, ypMx, ypPR (pathologic partial response to systemic neoadjuvant chemotherapy). -- S/P PMRT completed treatment on 03/01/2023. Followup with Dr. Miller. -- Breast exam did not reveal palpable abnormality. She will have left breast screening mammo in 4/2024. Referral provided today. -- Continue Letrozole daily ( Start date 09/2022) -- CT C/A/P in 10/9/23 Since  April 6, 2023 Interval development of peripheral 1.1 x 8 mm pleural-based opacity , right upper lobe (302/118). Follow-up CT scan 3 months time recommended. Multiple stable micronodules are noted best seen on MIP imaging.1.  Since April 6, 2023, previously noted enhancing right hepatic lesion is not definitively visualized.2.  Post sigmoid colectomy with unremarkable appearance of the colonic anastomosis; no definite evidence of metastatic disease in the abdomen or pelvis.3.  Additional incidental findings as above. Will refer her for Ct chest without contrast for reevaluation of pleural based opacity 1/2024.  -- Bone density was normal. Advised to continue calcium and vitamin D and regular exercise -- Order blood work: CBC, BMP, LFT, CEA, .  # IIA ([id number]) adenocarcinoma of the sigmoid colon, MMR proficient, s/p robotic sigmoidectomy. -- Follow up with GI. Up to date with surveillance colonoscopy 4/2023. Next due in 3 years. -- CT A/P 10/9/2023 1.  Since April 6, 2023, previously noted enhancing right hepatic lesion is not definitively visualized.2.  Post sigmoid colectomy with unremarkable appearance of the colonic anastomosis; no definite evidence of metastatic disease in the abdomen or pelvis.3.  Additional incidental findings as above.Ventral abdominal wall hernia containing fat. Well-circumscribed lucent lesion with a sclerotic rim in the right proximal femur, likely benign.  RTO for f/u in 3 months with Dr Daniel.

## 2023-12-27 NOTE — PHYSICAL EXAM
[Fully active, able to carry on all pre-disease performance without restriction] : Status 0 - Fully active, able to carry on all pre-disease performance without restriction [Normal] : affect appropriate [de-identified] : right breast s/p mastectomy, s/p radiation with skin reaction. Has area of desquamation to right chest wall. No palpable abnormality in the left breast.

## 2023-12-27 NOTE — HISTORY OF PRESENT ILLNESS
[de-identified] : 72 yo female was referred by Dr. Quinonez for consultation of breast cancer. Jesica initially presented to hospital for constipation and abdominal pain. CT a/p on 11/24/21 showed  large bowel obstruction secondary to a likely malignant stricture in the proximal sigmoid colon, small volume ascites and lobulated, partially visualized right breast mass and left breast nodule. Physical exam correlation and correlation with dedicated breast imaging was recommended as malignancy is of concern.\par  \par  On 11/26/21, CT chest showed right and left breast masses. The right breast mass measured 6.5 x 3.8 x 7.0 cm and contained small calcifications. The left breast mass measured 3.1 x 2.9 x 2.6 cm.\par  \par  On 11/29/21, colonoscopy revealed sigmoid colon mass and and biopsy showed invasive adenocarcinoma, moderately differentiated. MMR proteins were all expressed. 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 \par  \par  On 12/21/21, b/l breast dx mammo and US were performed which showed 10 cm mass at the 11 to 1:00 position 3 to 4 cm from the nipple corresponding to the biopsy-proven right breast carcinoma.  In the right axilla, at the 11:00 position 13 cm from the nipple, there is an abnormal lymph node measuring 2.3 x 1.3 x 0.9 cm. Ultrasound-guided biopsy is recommended. In the left breast, there are scattered similar appearing subcentimeter circumscribed masses seen throughout the left breast. These correspond to mammographically demonstrated masses:\par  At the 3:00 position 6 cm from the nipple, there is a mass measuring 0.4 x 0.3 x 0.3 cm. Ultrasound-guided biopsy of this mass is recommended.\par  At the 7 to 8:00 position 5 cm from the nipple, there is a mass measuring 0.4 x 0.3 x 0.2 cm.\par  At the 8 to 9:00 position 4 cm from the nipple, there is a circumscribed mass measuring 0.4 x 0.4 x 0.3 cm.\par  At the 8 to 9:00 position 3 cm from nipple, corresponding to area palpable concern and mammographic findings, there is a heterogeneous mass measuring 2.5 x 2.3 x 2.0 cm. Ultrasound-guided biopsy is recommended.\par  \par  On 12/30/21, left breast 3 N6 mass, ultrasound guided needle core biopsies revealed radial sclerosing lesion (radial scar) and Benign atrophic breast tissue with proliferative type fibrocystic changes. Left breast 8-9 N3 mass, ultrasound guided needle core biopsies showed single minute fragment of benign atrophic breast tissue.  \par  The right axillary mass, ultrasound guided needle core biopsies were positive for moderately differentiated adenocarcinoma, most likely representative of metastasis from the patient's known mammary primary carcinoma to an axillary lymph node with mervat replacement, obliteration, and extensive extracapsular extension.\par  \par  On 1/19/22, left breast relatively posterior calcifications, stereotactic guided vacuum assisted needle core biopsies showed radial sclerosing lesion (radial scar) and benign atrophic breast tissue with proliferative type fibrocystic changes. Left relatively anterior calcifications, stereotactic guided vacuum assisted needle core biopsies showed benign atrophic breast tissue with proliferative type fibrocystic changes.  \par  \par  On 1/5/22, she had b/l breast MRI which showed 8 cm biopsy-proven right breast index carcinoma and axillary mervat metastasis. Biopsy-proven left breast radial scar with no additional sites of suspicious enhancement.\par  \par  On 2/17/22, she underwent robotic sigmoidectomy and colostomy. The pathology revealed Invasive moderately differentiated adenocarcinoma (6 cm in greatest dimension), invading through muscularis propria into pericolorectal tissue (pT3), +LVI.  All margins were negative for invasive carcinoma.  Twenty two lymph nodes were negative for metastatic carcinoma. She recovered well from surgery. \par  \par  She is here today with her  to discuss breast cancer treatment. She has no family h/o breast cancer or any other kinds of cancer.  [de-identified] : 4/21/22: Jesica is here today for followup and chemotherapy. She has synchronous colon cancer and breast cancer: invasive moderately differentiated adenocarcinoma of the sigmoid colon, MMR proficient, s/p robotic sigmoidectomy and colostomy. 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 Clinical stage is cT3N1. Her staging CT and bone scan did not show evidence of distant mets. She is here today to start neoadjuvant chemotherapy for breast cancer.   5/9/22: Jesica is here today for followup and chemotherapy. She has synchronous colon cancer and breast cancer: invasive moderately differentiated adenocarcinoma of the sigmoid colon, MMR proficient, s/p robotic sigmoidectomy and colostomy. 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 Clinical stage is cT3N1. Her staging CT and bone scan did not show evidence of distant mets. She started on neoadjuvant chemotherapy for breast cancer. She had 1st cycle AC 2 weeks ago and tolerated well.   5/23/22 Jesica is here today for followup and chemotherapy. She has synchronous colon cancer and breast cancer: invasive moderately differentiated adenocarcinoma of the sigmoid colon, MMR proficient, s/p robotic sigmoidectomy and colostomy. 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 Clinical stage is cT3N1. Her staging CT and bone scan did not show evidence of distant mets. She started on neoadjuvant chemotherapy. To date, she has had 2 cycles and tolerated well. She did not have fever, mouth sore, vomiting or diarrhea.   6/6/22 Jesica is here today for followup and chemotherapy. She has synchronous colon cancer and breast cancer: invasive moderately differentiated adenocarcinoma of the sigmoid colon, MMR proficient, s/p robotic sigmoidectomy and colostomy. 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 Clinical stage is cT3N1. Her staging CT and bone scan did not show evidence of distant mets. She has been on neoadjuvant chemotherapy with dose dense AC. To date, she has had 3 cycles and tolerated well. She did not have fever, mouth sore, vomiting or diarrhea. She does endorse some burning/tearing sensation in the eye.   6/20/22: Jesica is here today for followup and chemotherapy. She has synchronous colon cancer and breast cancer: invasive moderately differentiated adenocarcinoma of the sigmoid colon, MMR proficient, s/p robotic sigmoidectomy and colostomy. 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 Clinical stage is cT3N1. Her staging CT and bone scan did not show evidence of distant mets. She has been on neoadjuvant chemotherapy. She completed dose dense AC x 4. She feels more fatigue. Her eye burning is getting better.  7/11/22 Jesica is here for follow up and chemotherapy.  She has synchronous colon cancer and breast cancer: invasive moderately differentiated adenocarcinoma of the sigmoid colon, MMR proficient, s/p robotic sigmoidectomy and colostomy. 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 Clinical stage is cT3N1. Her staging CT and bone scan did not show evidence of distant mets. She has been on neoadjuvant chemotherapy. She completed dose dense AC x 4. She completed 3 doses of weekly Taxol so far. She feels well. Her energy level is better and burning of the eyes is resolved. She followed up with Colorectal sx and they want to wait until Taxol is finished for her chemotherapy.  09/7/22 Jesica is here for follow up and chemotherapy.  She has synchronous colon cancer and breast cancer: invasive moderately differentiated adenocarcinoma of the sigmoid colon, MMR proficient, s/p robotic sigmoidectomy and colostomy. 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 Clinical stage is cT3N1. Her staging CT and bone scan did not show evidence of distant mets. She has been on neoadjuvant chemotherapy. She completed dose dense AC x 4. Currently, she is on weekly Taxol. To date, she has had 11 treatments. She tolerated chemotherapy well.   10/5/22 Jesica is here for follow up. She has synchronous colon cancer and breast cancer: invasive moderately differentiated adenocarcinoma of the sigmoid colon, MMR proficient, s/p robotic sigmoidectomy and colostomy. 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 Clinical stage is cT3N1. Her staging CT and bone scan did not show evidence of distant mets. She completed neoadjuvant chemotherapy with dose dense AC x 4 and weekly Taxol x 12. She started letrozole on 9/15 and tolerating well. She had breast MRI 9/22 showed biopsy-proven malignant right breast mass with associated skin thickening. Interval reduction in size of prior noted prominent right axillary lymph node likely treatment related. No MRI evidence of malignancy in the left breast. She also had restaging CT C/A/P which showed Unchanged bilateral few pulmonary nodules measuring up to 4 mm. Interval decrease in size of previously enlarged right axillary lymph node. Indeterminate hyperenhancing 1.5 cm right hepatic lobe lesion. MRI abdomen on 9/25/22 showed Hyperenhancing right hepatic lobe lesion, demonstrating restricted diffusion, indeterminate. Metastasis is on differential. She is pending biopsy of liver lesion.   11/2/22: Jesica is here for follow up. She has synchronous colon cancer and breast cancer: invasive moderately differentiated adenocarcinoma of the sigmoid colon, MMR proficient, s/p robotic sigmoidectomy and colostomy. 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 Clinical stage is cT3N1. She completed neoadjuvant chemotherapy with dose dense AC x 4 and weekly Taxol x 12. She started letrozole on 9/15 and tolerating well.  She had restaging CT C/A/P which showed Unchanged bilateral few pulmonary nodules measuring up to 4 mm. Interval decrease in size of previously enlarged right axillary lymph node. Indeterminate hyperenhancing 1.5 cm right hepatic lobe lesion. MRI abdomen on 9/25/22 showed Hyperenhancing right hepatic lobe lesion, demonstrating restricted diffusion, indeterminate. On 10/13/22, she had CT guided bx of the right liver lesion which is Negative for malignancy. The pathology showed focally distorted hepatic parenchyma. Differential diagnosis include nodular regenerative hyperplasia, focal nodular hyperplasia and hepatocellular adenoma.    12/28/22: Jesica is here for follow up. She has synchronous colon cancer and breast cancer: invasive moderately differentiated adenocarcinoma of the sigmoid colon, MMR proficient, s/p robotic sigmoidectomy and colostomy.   Patient reports feeling well, Right breast healing very well, no discharges.   On 12/8/22 S/P Right Modified radical mastectomy. left breast lumpectomy  Post surgical Pathology report : Final Diagnosis 1.  Breast, left anterior core biopsy site, radiofrequency seed localized lumpectomy: - Radial sclerosing lesion (radial scar) located adjacent to a healing prior biopsy site. - Benign atrophic breast tissue with fibrocystic changes, both proliferative and non-proliferative types, associated with numerous microcalcifications.  2.  Breast, left posterior core biopsy site, radiofrequency seed localized lumpectomy: - Radial sclerosing lesion (radial scar). - Benign atrophic breast tissue with fibrocystic changes, both proliferative and non-proliferative types, associated with microcalcifications. - Healing prior biopsy site.  01/25/2023: Jesica is here for follow up. She has synchronous colon cancer and breast cancer, stage is IIA ([id number]) invasive moderately differentiated adenocarcinoma of the sigmoid colon, MMR proficient, s/p robotic sigmoidectomy and colostomy, and invasive well to moderately differentiated ductal carcinoma of the right breast ER/NJ positive and Her-2 negative, Clinical stage is cT3N1, s/p neoadjuvant chemo with AC-T followed right MRM and left breast lumpectomy on 12/8/22. The right mastectomy showed 1.4 cm residual invasive well differentiated ductal carcinoma (colloid carcinoma), %, NJ neg, KI 67 2% and Her-2 negative. There are metastatic carcinoma present in three of twenty-eight axillary lymph nodes (3/28), the largest focus of which measures 3.5 mm, +JESSICA.  AJCC 8th Edition Pathologic Stage: ypT1c, ypN1a, ypMx, ypPR (pathologic partial response to systemic neoadjuvant chemotherapy). She has been taking Letrozole daily and tolerated well. She saw Dr. Miller and started on PMRT.   03/22/2023 Jesica is here for follow up. She has synchronous colon cancer and breast cancer, stage is IIA ([id number]) invasive moderately differentiated adenocarcinoma of the sigmoid colon, MMR proficient, s/p robotic sigmoidectomy and colostomy, and invasive well to moderately differentiated ductal carcinoma of the right breast ER/NJ positive and Her-2 negative, Clinical stage is cT3N1, s/p neoadjuvant chemo with AC-T followed right MRM and left breast lumpectomy on 12/8/22. The right mastectomy showed 1.4 cm residual invasive well differentiated ductal carcinoma (colloid carcinoma), %, NJ neg, KI 67 2% and Her-2 negative. three of twenty-eight axillary lymph nodes (3/28) were positive, +JESSICA.  AJCC 8th Edition Pathologic Stage: ypT1c, ypN1a, ypMx, ypPR. She has been taking Letrozole daily and tolerated well. She saw Dr. Miller and completed PMRT on 03/01/2023. She still has skin reaction at radiation site.   06/14/2023 Jesica is here for follow up. She has synchronous colon cancer and breast cancer, stage is IIA ([id number]) invasive moderately differentiated adenocarcinoma of the sigmoid colon, MMR proficient, s/p robotic sigmoidectomy and colostomy, and invasive well to moderately differentiated ductal carcinoma of the right breast ER/NJ positive and Her-2 negative, Clinical stage is cT3N1, s/p neoadjuvant chemo with AC-T followed right MRM and left breast lumpectomy on 12/8/22. The right mastectomy showed 1.4 cm residual invasive well differentiated ductal carcinoma (colloid carcinoma), %, NJ neg, KI 67 2% and Her-2 negative. three of twenty-eight axillary lymph nodes (3/28) were positive, +JESSICA.  AJCC 8th Edition Pathologic Stage: ypT1c, ypN1a, ypMx, ypPR. She has been taking Letrozole daily since 9/2022 and tolerated well. She saw Dr. Miller and completed PMRT on 03/01/2023. She had CT CAP in 4/2023 which showed stable pulmonary nodules, largest within the right lower lobe measuring up to 4 mm.  No definite sites of abdominopelvic metastatic disease. Previously described indeterminate hyperenhancing liver segment VIII lesion is less conspicuous, and may have decreased in size, now measuring up to 1 cm, previously 1.5 cm. She had Left breast mammogram 4/2023 Postsurgical changes of the left breast. Stable benign masses. She had surveillance colonoscopy in 4/2023 polypectomy which was benign, next colonoscopy due in 3 years. She had port removed 5/2023.  9/20/23: Jesica is here for follow up. She has synchronous colon cancer and breast cancer, stage is IIA ([id number]) invasive moderately differentiated adenocarcinoma of the sigmoid colon, MMR proficient, s/p robotic sigmoidectomy and colostomy, and invasive well to moderately differentiated ductal carcinoma of the right breast ER/NJ positive and Her-2 negative, Clinical stage is cT3N1, s/p neoadjuvant chemo with AC-T followed right MRM and left breast lumpectomy on 12/8/22. The right mastectomy showed 1.4 cm residual invasive well differentiated ductal carcinoma (colloid carcinoma), %, NJ neg, KI 67 2% and Her-2 negative. three of twenty-eight axillary lymph nodes (3/28) were positive, +JESSICA.  AJCC 8th Edition Pathologic Stage: ypT1c, ypN1a, ypMx, ypPR. She has been taking Letrozole daily since 9/2022 and tolerated well. She saw Dr. Miller and completed PMRT on 03/01/2023. She had CT CAP in 4/2023 which showed stable pulmonary nodules, largest within the right lower lobe measuring up to 4 mm.  No definite sites of abdominopelvic metastatic disease. Previously described indeterminate hyperenhancing liver segment VIII lesion is less conspicuous, and may have decreased in size, now measuring up to 1 cm, previously 1.5 cm. She had Left breast mammogram 4/2023 which showed postsurgical changes of the left breast. Stable benign masses. She had surveillance colonoscopy in 4/2023 polypectomy which was benign, next colonoscopy due in 3 years. She is feeling well and does not have new complains.   12/27/23 Jesica is here for follow up. She has synchronous colon cancer and breast cancer, stage is IIA ([id number]) invasive moderately differentiated adenocarcinoma of the sigmoid colon, MMR proficient, s/p robotic sigmoidectomy and colostomy, and invasive well to moderately differentiated ductal carcinoma of the right breast ER/NJ positive and Her-2 negative, Clinical stage is cT3N1, s/p neoadjuvant chemo with AC-T followed right MRM and left breast lumpectomy on 12/8/22. The right mastectomy showed 1.4 cm residual invasive well differentiated ductal carcinoma (colloid carcinoma), %, NJ neg, KI 67 2% and Her-2 negative. three of twenty-eight axillary lymph nodes (3/28) were positive, +JESSICA.  AJCC 8th Edition Pathologic Stage: ypT1c, ypN1a, ypMx, ypPR. She has been taking Letrozole daily since 9/2022 and tolerated well. She saw Dr. Miller and completed PMRT on 03/01/2023. She had Left breast mammogram 4/2023 which showed postsurgical changes of the left breast. Stable benign masses. She had surveillance colonoscopy in 4/2023 polypectomy which was benign, next colonoscopy due in 3 years. She had CT C/A/P on 10/9/23 Since  April 6, 2023 showed Interval development of peripheral 1.1 x 8 mm pleural-based opacity , right upper lobe (302/118). Follow-up CT scan 3 months time recommended. Multiple stable micronodules are noted best seen on MIP imaging. 1.  Since April 6, 2023, previously noted enhancing right hepatic lesion is not definitively visualized. 2.  Post sigmoid colectomy with unremarkable appearance of the colonic anastomosis; no definite evidence of metastatic disease in the abdomen or pelvis. She is feeling well and does not have new complains.

## 2023-12-27 NOTE — CONSULT LETTER
[Dear  ___] : Dear  [unfilled], [Courtesy Letter:] : I had the pleasure of seeing your patient, [unfilled], in my office today. [Please see my note below.] : Please see my note below. [Sincerely,] : Sincerely, [DrLexis  ___] : Dr. SALES [DrLexis ___] : Dr. SALES [FreeTextEntry3] : Yogesh Daniel MD

## 2023-12-28 DIAGNOSIS — C50.919 MALIGNANT NEOPLASM OF UNSPECIFIED SITE OF UNSPECIFIED FEMALE BREAST: ICD-10-CM

## 2024-01-08 ENCOUNTER — APPOINTMENT (OUTPATIENT)
Dept: SURGERY | Facility: CLINIC | Age: 74
End: 2024-01-08

## 2024-01-08 ENCOUNTER — APPOINTMENT (OUTPATIENT)
Dept: SURGERY | Facility: CLINIC | Age: 74
End: 2024-01-08
Payer: MEDICARE

## 2024-01-08 VITALS
BODY MASS INDEX: 31.5 KG/M2 | TEMPERATURE: 97.1 F | OXYGEN SATURATION: 98 % | WEIGHT: 196 LBS | SYSTOLIC BLOOD PRESSURE: 150 MMHG | HEART RATE: 110 BPM | HEIGHT: 66 IN | DIASTOLIC BLOOD PRESSURE: 90 MMHG

## 2024-01-08 PROCEDURE — 99213 OFFICE O/P EST LOW 20 MIN: CPT

## 2024-01-24 ENCOUNTER — RESULT REVIEW (OUTPATIENT)
Age: 74
End: 2024-01-24

## 2024-01-24 ENCOUNTER — OUTPATIENT (OUTPATIENT)
Dept: OUTPATIENT SERVICES | Facility: HOSPITAL | Age: 74
LOS: 1 days | End: 2024-01-24
Payer: MEDICARE

## 2024-01-24 DIAGNOSIS — Z90.11 ACQUIRED ABSENCE OF RIGHT BREAST AND NIPPLE: Chronic | ICD-10-CM

## 2024-01-24 DIAGNOSIS — C18.7 MALIGNANT NEOPLASM OF SIGMOID COLON: ICD-10-CM

## 2024-01-24 DIAGNOSIS — Z90.49 ACQUIRED ABSENCE OF OTHER SPECIFIED PARTS OF DIGESTIVE TRACT: Chronic | ICD-10-CM

## 2024-01-24 DIAGNOSIS — Z95.828 PRESENCE OF OTHER VASCULAR IMPLANTS AND GRAFTS: Chronic | ICD-10-CM

## 2024-01-24 PROCEDURE — 71250 CT THORAX DX C-: CPT

## 2024-01-24 PROCEDURE — 71250 CT THORAX DX C-: CPT | Mod: 26,MH

## 2024-01-25 DIAGNOSIS — C18.7 MALIGNANT NEOPLASM OF SIGMOID COLON: ICD-10-CM

## 2024-02-20 ENCOUNTER — RESULT CHARGE (OUTPATIENT)
Age: 74
End: 2024-02-20

## 2024-02-20 ENCOUNTER — APPOINTMENT (OUTPATIENT)
Dept: CARDIOLOGY | Facility: CLINIC | Age: 74
End: 2024-02-20
Payer: MEDICARE

## 2024-02-20 VITALS — DIASTOLIC BLOOD PRESSURE: 80 MMHG | SYSTOLIC BLOOD PRESSURE: 138 MMHG | RESPIRATION RATE: 18 BRPM

## 2024-02-20 VITALS — HEART RATE: 100 BPM | HEIGHT: 66 IN | BODY MASS INDEX: 31.18 KG/M2 | WEIGHT: 194 LBS

## 2024-02-20 DIAGNOSIS — R73.03 PREDIABETES.: ICD-10-CM

## 2024-02-20 DIAGNOSIS — C50.919 MALIGNANT NEOPLASM OF UNSPECIFIED SITE OF UNSPECIFIED FEMALE BREAST: ICD-10-CM

## 2024-02-20 DIAGNOSIS — E78.2 MIXED HYPERLIPIDEMIA: ICD-10-CM

## 2024-02-20 DIAGNOSIS — R00.0 TACHYCARDIA, UNSPECIFIED: ICD-10-CM

## 2024-02-20 DIAGNOSIS — I10 ESSENTIAL (PRIMARY) HYPERTENSION: ICD-10-CM

## 2024-02-20 PROCEDURE — 99214 OFFICE O/P EST MOD 30 MIN: CPT

## 2024-02-20 PROCEDURE — 93000 ELECTROCARDIOGRAM COMPLETE: CPT

## 2024-03-20 ENCOUNTER — APPOINTMENT (OUTPATIENT)
Dept: SURGERY | Facility: CLINIC | Age: 74
End: 2024-03-20
Payer: MEDICARE

## 2024-03-20 VITALS
BODY MASS INDEX: 31.18 KG/M2 | DIASTOLIC BLOOD PRESSURE: 86 MMHG | SYSTOLIC BLOOD PRESSURE: 140 MMHG | WEIGHT: 194 LBS | HEART RATE: 96 BPM | TEMPERATURE: 97 F | OXYGEN SATURATION: 98 % | HEIGHT: 66 IN

## 2024-03-20 PROCEDURE — 99213 OFFICE O/P EST LOW 20 MIN: CPT

## 2024-03-28 ENCOUNTER — APPOINTMENT (OUTPATIENT)
Dept: HEMATOLOGY ONCOLOGY | Facility: CLINIC | Age: 74
End: 2024-03-28

## 2024-04-01 NOTE — ADDENDUM
[FreeTextEntry1] : I, Jessica Rivera ([facility]) assisted in filling out this chart under the dictation of Dr. Roland Fritz on 03/20/2024.

## 2024-04-01 NOTE — HISTORY OF PRESENT ILLNESS
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 Pathology showed T3N0 (0/22LN) moderately differentiated adenocarcinoma with LVI. She is tolerating a diet and having daily BM. Patient is currently undergoing surveillance of her Colon Cancer. She did not need adjuvant chemotherapy. Patient's most recent CEA level in December 2023 was 1.6. Patient's most recent CT of the chest, abdomen, and pelvis in January 2024 showed no evidence of metastatic disease. Patient's most recent CT of the abdomen pelvis in October 2023 showed no evidence of recurrent or metastatic disease. Last CT of the chest abdomen pelvis was April 2023 which showed stable pulmonary nodules and no evidence of metastatic disease. Last CEA level in June 2023 was 2.5. Patient states she feels well. She is tolerating a diet. Patient denies fevers, chills, nausea, vomiting, abdominal pain, constipation, diarrhea, blood in his stool or unexpected weight loss. Patient denies a family history of colon cancer rectal cancer or inflammatory bowel disease. Patient's most recent colonoscopy was April 13th, 2023 by Dr. Bean which showed hemorrhoids and a small tubular adenoma.

## 2024-04-01 NOTE — ASSESSMENT
[FreeTextEntry1] : 73-year-old female with concurrent breast and obstructing colon cancer, s/p diverting loop colostomy, now s/p robotic sigmoidectomy colostomy closure.   The patient continues to do well with no evidence of disease. She will follow up with Dr. Daniel for images and blood work. She will follow up with Dr. Bean for future colonoscopies. We will see her back in six months.

## 2024-04-01 NOTE — PHYSICAL EXAM
[FreeTextEntry1] : Gastrointestinal: No abdominal masses. No abdominal tenderness. Soft, non tender, non distended. Well healed incisions. RUQ stoma site healed. No erythema induration or purulence. Liver: no hepatosplenomegaly. General Appearance: awake, alert and in no acute distress. Respiratory: normal respiratory effort. Cardiovascular: normal rate and rhythm.

## 2024-04-05 ENCOUNTER — RESULT REVIEW (OUTPATIENT)
Age: 74
End: 2024-04-05

## 2024-04-05 ENCOUNTER — OUTPATIENT (OUTPATIENT)
Dept: OUTPATIENT SERVICES | Facility: HOSPITAL | Age: 74
LOS: 1 days | End: 2024-04-05
Payer: MEDICARE

## 2024-04-05 DIAGNOSIS — Z95.828 PRESENCE OF OTHER VASCULAR IMPLANTS AND GRAFTS: Chronic | ICD-10-CM

## 2024-04-05 DIAGNOSIS — Z12.31 ENCOUNTER FOR SCREENING MAMMOGRAM FOR MALIGNANT NEOPLASM OF BREAST: ICD-10-CM

## 2024-04-05 DIAGNOSIS — Z90.49 ACQUIRED ABSENCE OF OTHER SPECIFIED PARTS OF DIGESTIVE TRACT: Chronic | ICD-10-CM

## 2024-04-05 DIAGNOSIS — Z90.11 ACQUIRED ABSENCE OF RIGHT BREAST AND NIPPLE: Chronic | ICD-10-CM

## 2024-04-05 PROCEDURE — 77067 SCR MAMMO BI INCL CAD: CPT | Mod: 26,52,LT

## 2024-04-05 PROCEDURE — 77067 SCR MAMMO BI INCL CAD: CPT | Mod: 52

## 2024-04-05 PROCEDURE — 77063 BREAST TOMOSYNTHESIS BI: CPT | Mod: 52

## 2024-04-06 DIAGNOSIS — Z12.31 ENCOUNTER FOR SCREENING MAMMOGRAM FOR MALIGNANT NEOPLASM OF BREAST: ICD-10-CM

## 2024-04-10 ENCOUNTER — APPOINTMENT (OUTPATIENT)
Age: 74
End: 2024-04-10
Payer: MEDICARE

## 2024-04-10 ENCOUNTER — OUTPATIENT (OUTPATIENT)
Dept: OUTPATIENT SERVICES | Facility: HOSPITAL | Age: 74
LOS: 1 days | End: 2024-04-10
Payer: MEDICARE

## 2024-04-10 ENCOUNTER — LABORATORY RESULT (OUTPATIENT)
Age: 74
End: 2024-04-10

## 2024-04-10 VITALS
HEART RATE: 121 BPM | WEIGHT: 190 LBS | DIASTOLIC BLOOD PRESSURE: 74 MMHG | HEIGHT: 66 IN | SYSTOLIC BLOOD PRESSURE: 198 MMHG | TEMPERATURE: 98 F | BODY MASS INDEX: 30.53 KG/M2

## 2024-04-10 DIAGNOSIS — Z90.49 ACQUIRED ABSENCE OF OTHER SPECIFIED PARTS OF DIGESTIVE TRACT: Chronic | ICD-10-CM

## 2024-04-10 DIAGNOSIS — Z79.811 ENCOUNTER FOR THERAPEUTIC DRUG LVL MONITORING: ICD-10-CM

## 2024-04-10 DIAGNOSIS — Z90.11 ACQUIRED ABSENCE OF RIGHT BREAST AND NIPPLE: Chronic | ICD-10-CM

## 2024-04-10 DIAGNOSIS — C18.7 MALIGNANT NEOPLASM OF SIGMOID COLON: ICD-10-CM

## 2024-04-10 DIAGNOSIS — Z95.828 PRESENCE OF OTHER VASCULAR IMPLANTS AND GRAFTS: Chronic | ICD-10-CM

## 2024-04-10 DIAGNOSIS — Z51.81 ENCOUNTER FOR THERAPEUTIC DRUG LVL MONITORING: ICD-10-CM

## 2024-04-10 DIAGNOSIS — Z17.0 MALIGNANT NEOPLASM OF UNSPECIFIED SITE OF RIGHT FEMALE BREAST: ICD-10-CM

## 2024-04-10 DIAGNOSIS — D64.9 ANEMIA, UNSPECIFIED: ICD-10-CM

## 2024-04-10 DIAGNOSIS — C50.911 MALIGNANT NEOPLASM OF UNSPECIFIED SITE OF RIGHT FEMALE BREAST: ICD-10-CM

## 2024-04-10 DIAGNOSIS — R91.8 OTHER NONSPECIFIC ABNORMAL FINDING OF LUNG FIELD: ICD-10-CM

## 2024-04-10 LAB
HCT VFR BLD CALC: 45.2 %
HGB BLD-MCNC: 15.7 G/DL
MCHC RBC-ENTMCNC: 30 PG
MCHC RBC-ENTMCNC: 34.7 G/DL
MCV RBC AUTO: 86.4 FL
PLATELET # BLD AUTO: 217 K/UL
PMV BLD: 10.1 FL
RBC # BLD: 5.23 M/UL
RBC # FLD: 13.3 %
WBC # FLD AUTO: 5.76 K/UL

## 2024-04-10 PROCEDURE — 99214 OFFICE O/P EST MOD 30 MIN: CPT

## 2024-04-10 PROCEDURE — 82378 CARCINOEMBRYONIC ANTIGEN: CPT

## 2024-04-10 PROCEDURE — 85027 COMPLETE CBC AUTOMATED: CPT

## 2024-04-10 PROCEDURE — 80048 BASIC METABOLIC PNL TOTAL CA: CPT

## 2024-04-10 PROCEDURE — 86300 IMMUNOASSAY TUMOR CA 15-3: CPT

## 2024-04-10 PROCEDURE — 80076 HEPATIC FUNCTION PANEL: CPT

## 2024-04-10 NOTE — HISTORY OF PRESENT ILLNESS
[de-identified] : 70 yo female was referred by Dr. Quinonez for consultation of breast cancer. Jesica initially presented to hospital for constipation and abdominal pain. CT a/p on 11/24/21 showed  large bowel obstruction secondary to a likely malignant stricture in the proximal sigmoid colon, small volume ascites and lobulated, partially visualized right breast mass and left breast nodule. Physical exam correlation and correlation with dedicated breast imaging was recommended as malignancy is of concern.\par  \par  On 11/26/21, CT chest showed right and left breast masses. The right breast mass measured 6.5 x 3.8 x 7.0 cm and contained small calcifications. The left breast mass measured 3.1 x 2.9 x 2.6 cm.\par  \par  On 11/29/21, colonoscopy revealed sigmoid colon mass and and biopsy showed invasive adenocarcinoma, moderately differentiated. MMR proteins were all expressed. 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 \par  \par  On 12/21/21, b/l breast dx mammo and US were performed which showed 10 cm mass at the 11 to 1:00 position 3 to 4 cm from the nipple corresponding to the biopsy-proven right breast carcinoma.  In the right axilla, at the 11:00 position 13 cm from the nipple, there is an abnormal lymph node measuring 2.3 x 1.3 x 0.9 cm. Ultrasound-guided biopsy is recommended. In the left breast, there are scattered similar appearing subcentimeter circumscribed masses seen throughout the left breast. These correspond to mammographically demonstrated masses:\par  At the 3:00 position 6 cm from the nipple, there is a mass measuring 0.4 x 0.3 x 0.3 cm. Ultrasound-guided biopsy of this mass is recommended.\par  At the 7 to 8:00 position 5 cm from the nipple, there is a mass measuring 0.4 x 0.3 x 0.2 cm.\par  At the 8 to 9:00 position 4 cm from the nipple, there is a circumscribed mass measuring 0.4 x 0.4 x 0.3 cm.\par  At the 8 to 9:00 position 3 cm from nipple, corresponding to area palpable concern and mammographic findings, there is a heterogeneous mass measuring 2.5 x 2.3 x 2.0 cm. Ultrasound-guided biopsy is recommended.\par  \par  On 12/30/21, left breast 3 N6 mass, ultrasound guided needle core biopsies revealed radial sclerosing lesion (radial scar) and Benign atrophic breast tissue with proliferative type fibrocystic changes. Left breast 8-9 N3 mass, ultrasound guided needle core biopsies showed single minute fragment of benign atrophic breast tissue.  \par  The right axillary mass, ultrasound guided needle core biopsies were positive for moderately differentiated adenocarcinoma, most likely representative of metastasis from the patient's known mammary primary carcinoma to an axillary lymph node with mervat replacement, obliteration, and extensive extracapsular extension.\par  \par  On 1/19/22, left breast relatively posterior calcifications, stereotactic guided vacuum assisted needle core biopsies showed radial sclerosing lesion (radial scar) and benign atrophic breast tissue with proliferative type fibrocystic changes. Left relatively anterior calcifications, stereotactic guided vacuum assisted needle core biopsies showed benign atrophic breast tissue with proliferative type fibrocystic changes.  \par  \par  On 1/5/22, she had b/l breast MRI which showed 8 cm biopsy-proven right breast index carcinoma and axillary mervat metastasis. Biopsy-proven left breast radial scar with no additional sites of suspicious enhancement.\par  \par  On 2/17/22, she underwent robotic sigmoidectomy and colostomy. The pathology revealed Invasive moderately differentiated adenocarcinoma (6 cm in greatest dimension), invading through muscularis propria into pericolorectal tissue (pT3), +LVI.  All margins were negative for invasive carcinoma.  Twenty two lymph nodes were negative for metastatic carcinoma. She recovered well from surgery. \par  \par  She is here today with her  to discuss breast cancer treatment. She has no family h/o breast cancer or any other kinds of cancer.  [de-identified] : 4/21/22: Jesica is here today for followup and chemotherapy. She has synchronous colon cancer and breast cancer: invasive moderately differentiated adenocarcinoma of the sigmoid colon, MMR proficient, s/p robotic sigmoidectomy and colostomy. 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 Clinical stage is cT3N1. Her staging CT and bone scan did not show evidence of distant mets. She is here today to start neoadjuvant chemotherapy for breast cancer.   5/9/22: Jesica is here today for followup and chemotherapy. She has synchronous colon cancer and breast cancer: invasive moderately differentiated adenocarcinoma of the sigmoid colon, MMR proficient, s/p robotic sigmoidectomy and colostomy. 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 Clinical stage is cT3N1. Her staging CT and bone scan did not show evidence of distant mets. She started on neoadjuvant chemotherapy for breast cancer. She had 1st cycle AC 2 weeks ago and tolerated well.   5/23/22 Jesica is here today for followup and chemotherapy. She has synchronous colon cancer and breast cancer: invasive moderately differentiated adenocarcinoma of the sigmoid colon, MMR proficient, s/p robotic sigmoidectomy and colostomy. 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 Clinical stage is cT3N1. Her staging CT and bone scan did not show evidence of distant mets. She started on neoadjuvant chemotherapy. To date, she has had 2 cycles and tolerated well. She did not have fever, mouth sore, vomiting or diarrhea.   6/6/22 Jesica is here today for followup and chemotherapy. She has synchronous colon cancer and breast cancer: invasive moderately differentiated adenocarcinoma of the sigmoid colon, MMR proficient, s/p robotic sigmoidectomy and colostomy. 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 Clinical stage is cT3N1. Her staging CT and bone scan did not show evidence of distant mets. She has been on neoadjuvant chemotherapy with dose dense AC. To date, she has had 3 cycles and tolerated well. She did not have fever, mouth sore, vomiting or diarrhea. She does endorse some burning/tearing sensation in the eye.   6/20/22: Jesica is here today for followup and chemotherapy. She has synchronous colon cancer and breast cancer: invasive moderately differentiated adenocarcinoma of the sigmoid colon, MMR proficient, s/p robotic sigmoidectomy and colostomy. 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 Clinical stage is cT3N1. Her staging CT and bone scan did not show evidence of distant mets. She has been on neoadjuvant chemotherapy. She completed dose dense AC x 4. She feels more fatigue. Her eye burning is getting better.  7/11/22 Jesica is here for follow up and chemotherapy.  She has synchronous colon cancer and breast cancer: invasive moderately differentiated adenocarcinoma of the sigmoid colon, MMR proficient, s/p robotic sigmoidectomy and colostomy. 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 Clinical stage is cT3N1. Her staging CT and bone scan did not show evidence of distant mets. She has been on neoadjuvant chemotherapy. She completed dose dense AC x 4. She completed 3 doses of weekly Taxol so far. She feels well. Her energy level is better and burning of the eyes is resolved. She followed up with Colorectal sx and they want to wait until Taxol is finished for her chemotherapy.  09/7/22 Jesica is here for follow up and chemotherapy.  She has synchronous colon cancer and breast cancer: invasive moderately differentiated adenocarcinoma of the sigmoid colon, MMR proficient, s/p robotic sigmoidectomy and colostomy. 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 Clinical stage is cT3N1. Her staging CT and bone scan did not show evidence of distant mets. She has been on neoadjuvant chemotherapy. She completed dose dense AC x 4. Currently, she is on weekly Taxol. To date, she has had 11 treatments. She tolerated chemotherapy well.   10/5/22 Jesica is here for follow up. She has synchronous colon cancer and breast cancer: invasive moderately differentiated adenocarcinoma of the sigmoid colon, MMR proficient, s/p robotic sigmoidectomy and colostomy. 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 Clinical stage is cT3N1. Her staging CT and bone scan did not show evidence of distant mets. She completed neoadjuvant chemotherapy with dose dense AC x 4 and weekly Taxol x 12. She started letrozole on 9/15 and tolerating well. She had breast MRI 9/22 showed biopsy-proven malignant right breast mass with associated skin thickening. Interval reduction in size of prior noted prominent right axillary lymph node likely treatment related. No MRI evidence of malignancy in the left breast. She also had restaging CT C/A/P which showed Unchanged bilateral few pulmonary nodules measuring up to 4 mm. Interval decrease in size of previously enlarged right axillary lymph node. Indeterminate hyperenhancing 1.5 cm right hepatic lobe lesion. MRI abdomen on 9/25/22 showed Hyperenhancing right hepatic lobe lesion, demonstrating restricted diffusion, indeterminate. Metastasis is on differential. She is pending biopsy of liver lesion.   11/2/22: Jesica is here for follow up. She has synchronous colon cancer and breast cancer: invasive moderately differentiated adenocarcinoma of the sigmoid colon, MMR proficient, s/p robotic sigmoidectomy and colostomy. 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 Clinical stage is cT3N1. She completed neoadjuvant chemotherapy with dose dense AC x 4 and weekly Taxol x 12. She started letrozole on 9/15 and tolerating well.  She had restaging CT C/A/P which showed Unchanged bilateral few pulmonary nodules measuring up to 4 mm. Interval decrease in size of previously enlarged right axillary lymph node. Indeterminate hyperenhancing 1.5 cm right hepatic lobe lesion. MRI abdomen on 9/25/22 showed Hyperenhancing right hepatic lobe lesion, demonstrating restricted diffusion, indeterminate. On 10/13/22, she had CT guided bx of the right liver lesion which is Negative for malignancy. The pathology showed focally distorted hepatic parenchyma. Differential diagnosis include nodular regenerative hyperplasia, focal nodular hyperplasia and hepatocellular adenoma.   12/28/22: Jesica is here for follow up. She has synchronous colon cancer and breast cancer: invasive moderately differentiated adenocarcinoma of the sigmoid colon, MMR proficient, s/p robotic sigmoidectomy and colostomy.   Patient reports feeling well, Right breast healing very well, no discharges.   On 12/8/22 S/P Right Modified radical mastectomy. left breast lumpectomy  Post surgical Pathology report : Final Diagnosis 1.  Breast, left anterior core biopsy site, radiofrequency seed localized lumpectomy: - Radial sclerosing lesion (radial scar) located adjacent to a healing prior biopsy site. - Benign atrophic breast tissue with fibrocystic changes, both proliferative and non-proliferative types, associated with numerous microcalcifications.  2.  Breast, left posterior core biopsy site, radiofrequency seed localized lumpectomy: - Radial sclerosing lesion (radial scar). - Benign atrophic breast tissue with fibrocystic changes, both proliferative and non-proliferative types, associated with microcalcifications. - Healing prior biopsy site.  01/25/2023: Jesica is here for follow up. She has synchronous colon cancer and breast cancer, stage is IIA ([id number]) invasive moderately differentiated adenocarcinoma of the sigmoid colon, MMR proficient, s/p robotic sigmoidectomy and colostomy, and invasive well to moderately differentiated ductal carcinoma of the right breast ER/KS positive and Her-2 negative, Clinical stage is cT3N1, s/p neoadjuvant chemo with AC-T followed right MRM and left breast lumpectomy on 12/8/22. The right mastectomy showed 1.4 cm residual invasive well differentiated ductal carcinoma (colloid carcinoma), %, KS neg, KI 67 2% and Her-2 negative. There are metastatic carcinoma present in three of twenty-eight axillary lymph nodes (3/28), the largest focus of which measures 3.5 mm, +JESSICA.  AJCC 8th Edition Pathologic Stage: ypT1c, ypN1a, ypMx, ypPR (pathologic partial response to systemic neoadjuvant chemotherapy). She has been taking Letrozole daily and tolerated well. She saw Dr. Miller and started on PMRT.   03/22/2023 Jesica is here for follow up. She has synchronous colon cancer and breast cancer, stage is IIA ([id number]) invasive moderately differentiated adenocarcinoma of the sigmoid colon, MMR proficient, s/p robotic sigmoidectomy and colostomy, and invasive well to moderately differentiated ductal carcinoma of the right breast ER/KS positive and Her-2 negative, Clinical stage is cT3N1, s/p neoadjuvant chemo with AC-T followed right MRM and left breast lumpectomy on 12/8/22. The right mastectomy showed 1.4 cm residual invasive well differentiated ductal carcinoma (colloid carcinoma), %, KS neg, KI 67 2% and Her-2 negative. three of twenty-eight axillary lymph nodes (3/28) were positive, +JESSICA.  AJCC 8th Edition Pathologic Stage: ypT1c, ypN1a, ypMx, ypPR. She has been taking Letrozole daily and tolerated well. She saw Dr. Miller and completed PMRT on 03/01/2023. She still has skin reaction at radiation site.   06/14/2023 Jesica is here for follow up. She has synchronous colon cancer and breast cancer, stage is IIA ([id number]) invasive moderately differentiated adenocarcinoma of the sigmoid colon, MMR proficient, s/p robotic sigmoidectomy and colostomy, and invasive well to moderately differentiated ductal carcinoma of the right breast ER/KS positive and Her-2 negative, Clinical stage is cT3N1, s/p neoadjuvant chemo with AC-T followed right MRM and left breast lumpectomy on 12/8/22. The right mastectomy showed 1.4 cm residual invasive well differentiated ductal carcinoma (colloid carcinoma), %, KS neg, KI 67 2% and Her-2 negative. three of twenty-eight axillary lymph nodes (3/28) were positive, +JESSICA.  AJCC 8th Edition Pathologic Stage: ypT1c, ypN1a, ypMx, ypPR. She has been taking Letrozole daily since 9/2022 and tolerated well. She saw Dr. Miller and completed PMRT on 03/01/2023. She had CT CAP in 4/2023 which showed stable pulmonary nodules, largest within the right lower lobe measuring up to 4 mm.  No definite sites of abdominopelvic metastatic disease. Previously described indeterminate hyperenhancing liver segment VIII lesion is less conspicuous, and may have decreased in size, now measuring up to 1 cm, previously 1.5 cm. She had Left breast mammogram 4/2023 Postsurgical changes of the left breast. Stable benign masses. She had surveillance colonoscopy in 4/2023 polypectomy which was benign, next colonoscopy due in 3 years. She had port removed 5/2023.  9/20/23: Jesica is here for follow up. She has synchronous colon cancer and breast cancer, stage is IIA ([id number]) invasive moderately differentiated adenocarcinoma of the sigmoid colon, MMR proficient, s/p robotic sigmoidectomy and colostomy, and invasive well to moderately differentiated ductal carcinoma of the right breast ER/KS positive and Her-2 negative, Clinical stage is cT3N1, s/p neoadjuvant chemo with AC-T followed right MRM and left breast lumpectomy on 12/8/22. The right mastectomy showed 1.4 cm residual invasive well differentiated ductal carcinoma (colloid carcinoma), %, KS neg, KI 67 2% and Her-2 negative. three of twenty-eight axillary lymph nodes (3/28) were positive, +JESSICA.  AJCC 8th Edition Pathologic Stage: ypT1c, ypN1a, ypMx, ypPR. She has been taking Letrozole daily since 9/2022 and tolerated well. She saw Dr. Miller and completed PMRT on 03/01/2023. She had CT CAP in 4/2023 which showed stable pulmonary nodules, largest within the right lower lobe measuring up to 4 mm.  No definite sites of abdominopelvic metastatic disease. Previously described indeterminate hyperenhancing liver segment VIII lesion is less conspicuous, and may have decreased in size, now measuring up to 1 cm, previously 1.5 cm. She had Left breast mammogram 4/2023 which showed postsurgical changes of the left breast. Stable benign masses. She had surveillance colonoscopy in 4/2023 polypectomy which was benign, next colonoscopy due in 3 years. She is feeling well and does not have new complains.   4/10/24 Jesica is here for follow up, accopanied by spouse. She has synchronous colon cancer and breast cancer, stage is IIA ([id number]) invasive moderately differentiated adenocarcinoma of the sigmoid colon, MMR proficient, s/p robotic sigmoidectomy and colostomy, and invasive well to moderately differentiated ductal carcinoma of the right breast ER/KS positive and Her-2 negative, Clinical stage is cT3N1, s/p neoadjuvant chemo with AC-T followed right MRM and left breast lumpectomy on 12/8/22. The right mastectomy showed 1.4 cm residual invasive well differentiated ductal carcinoma (colloid carcinoma), %, KS neg, KI 67 2% and Her-2 negative. three of twenty-eight axillary lymph nodes (3/28) were positive, +JESSICA.  AJCC 8th Edition Pathologic Stage: ypT1c, ypN1a, ypMx, ypPR. She has been taking Letrozole daily since 9/2022, tolerating well. She completed PMRT on 03/01/2023. She had CT CAP in 4/2023 which showed stable pulmonary nodules, largest within the right lower lobe measuring up to 4 mm.  No definite sites of abdominopelvic metastatic disease. Previously described indeterminate hyperenhancing liver segment VIII lesion is less conspicuous, and may have decreased in size, now measuring up to 1 cm, previously 1.5 cm. CT CAP on 10/9/23 Interval development of peripheral 1.1 x 8 mm pleural-based opacity , right upper lobe (302/118). Follow-up CT scan 3 months time recommended. Multiple stable micronodules. Since April 6, 2023, previously noted enhancing right hepatic lesion is not definitively visualized. 2.  Post sigmoid colectomy with unremarkable appearance of the colonic anastomosis; no definite evidence of metastatic disease in the abdomen or pelvis. Most recent CT chest on 1/24/24 Interval resolution of prior right upper lobe pleural-based opacity. Scattered micronodules are essentially unchanged including largest right lower lobe 4 mm and left upper lobe 3 mm pulmonary nodules. She had Left breast mammogram 4/2023 which showed postsurgical changes of the left breast. Stable benign masses. She had surveillance colonoscopy in 4/2023 with polypectomy which was benign, next colonoscopy due in 2025. She is feeling well and does not have new complaints.

## 2024-04-10 NOTE — ASSESSMENT
[FreeTextEntry1] : 72 yo female has synchronous colon cancer and breast cancer: - Invasive moderately differentiated adenocarcinoma of the sigmoid colon, MMR proficient, s/p robotic sigmoidectomy and colostomy. The pathologic stage is IIA ([id number]). 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 Clinical stage is cT3N1.  Assessment and Plan: #  Invasive well to moderately differentiated ductal carcinoma of the right breast, ER positive, MI and Her-2 negative, clinical stage cT3N1. -- S/P  AC x 4 and weekly Taxol x 12.  -- S/P  right MRM and left breast lumpectomy on 12/8/22. The right mastectomy showed 1.4 cm residual invasive well differentiated ductal carcinoma (colloid carcinoma), %, MI neg, KI 67 2% and Her-2 negative. There are metastatic carcinoma present in three of twenty-eight axillary lymph nodes (3/28), the largest focus of which measures 3.5 mm, +JESSICA.  AJCC 8th Edition Pathologic Stage: ypT1c, ypN1a, ypMx, ypPR (pathologic partial response to systemic neoadjuvant chemotherapy).  -- S/P PMRT completed  treatment  on 03/01/2023. Followup with Dr. Miller. -- Breast exam did not reveal palpable abnormality. Left breast screening mammo in 4/5/2024 showed no mammographic evidence of malignancy. -- Continue Letrozole daily (Start on 9/2022), pt does not need refills at this time. -- Bone density on 10/5/2022 was normal, She is due for repeat bone density, script given. Advised to continue calcium and vitamin D and regular exercise. -- CT C/A/P in 4/2023 showed stable pulmonary nodules, largest within the right lower lobe measuring up to 4 mm.  No definite sites of abdominopelvic metastatic disease. Previously described indeterminate hyperenhancing liver segment VIII lesion is less conspicuous, and may have decreased in size, now measuring up to 1 cm, previously 1.5 cm.  CT CAP on 10/9/23 Interval development of peripheral 1.1 x 8 mm pleural-based opacity , right upper lobe (302/118). Multiple stable micronodules. Since April 6, 2023, previously noted enhancing right hepatic lesion is not definitively visualized. Post sigmoid colectomy with unremarkable appearance of the colonic anastomosis; no definite evidence of metastatic disease in the abdomen or pelvis. Most recent CT chest on 1/24/24 Interval resolution of prior right upper lobe pleural-based opacity. Scattered micronodules are essentially unchanged including largest right lower lobe 4 mm and left upper lobe 3 mm pulmonary nodules.  -- Order blood work: CBC, BMP, LFT, CEA, .  # IIA ([id number]) adenocarcinoma of the sigmoid colon, MMR proficient, s/p robotic sigmoidectomy.  -- Follow up with GI. Up to date with surveillance colonoscopy 4/2023. Next due in 2025.  -- Order repeat CT A/P, script given.  RTO for f/u in 3 months to see Dr Daniel.

## 2024-04-10 NOTE — PHYSICAL EXAM
[Fully active, able to carry on all pre-disease performance without restriction] : Status 0 - Fully active, able to carry on all pre-disease performance without restriction [Normal] : affect appropriate [de-identified] : Right breast s/p mastectomy. Left breast s/p lumpectomy. Chest and breast exam today is unrevelaing. No palpable abnormality noted. [de-identified] : + Ventral hernia.

## 2024-04-11 DIAGNOSIS — D64.9 ANEMIA, UNSPECIFIED: ICD-10-CM

## 2024-04-11 LAB
ALBUMIN SERPL ELPH-MCNC: 4.5 G/DL
ALBUMIN SERPL ELPH-MCNC: 4.6 G/DL
ALBUMIN SERPL ELPH-MCNC: 4.6 G/DL
ALBUMIN SERPL ELPH-MCNC: 4.7 G/DL
ALP BLD-CCNC: 86 U/L
ALP BLD-CCNC: 90 U/L
ALP BLD-CCNC: 91 U/L
ALP BLD-CCNC: 93 U/L
ALT SERPL-CCNC: 12 U/L
ALT SERPL-CCNC: 12 U/L
ALT SERPL-CCNC: 13 U/L
ALT SERPL-CCNC: 14 U/L
ANION GAP SERPL CALC-SCNC: 13 MMOL/L
ANION GAP SERPL CALC-SCNC: 13 MMOL/L
ANION GAP SERPL CALC-SCNC: 14 MMOL/L
ANION GAP SERPL CALC-SCNC: 14 MMOL/L
AST SERPL-CCNC: 18 U/L
AST SERPL-CCNC: 19 U/L
AST SERPL-CCNC: 20 U/L
AST SERPL-CCNC: 21 U/L
BILIRUB DIRECT SERPL-MCNC: 0.2 MG/DL
BILIRUB INDIRECT SERPL-MCNC: 0.4 MG/DL
BILIRUB INDIRECT SERPL-MCNC: 0.4 MG/DL
BILIRUB INDIRECT SERPL-MCNC: 0.5 MG/DL
BILIRUB INDIRECT SERPL-MCNC: 0.5 MG/DL
BILIRUB SERPL-MCNC: 0.6 MG/DL
BILIRUB SERPL-MCNC: 0.6 MG/DL
BILIRUB SERPL-MCNC: 0.7 MG/DL
BILIRUB SERPL-MCNC: 0.7 MG/DL
BUN SERPL-MCNC: 12 MG/DL
BUN SERPL-MCNC: 15 MG/DL
BUN SERPL-MCNC: 19 MG/DL
BUN SERPL-MCNC: 21 MG/DL
CALCIUM SERPL-MCNC: 10 MG/DL
CALCIUM SERPL-MCNC: 10.2 MG/DL
CALCIUM SERPL-MCNC: 9.6 MG/DL
CALCIUM SERPL-MCNC: 9.9 MG/DL
CANCER AG15-3 SERPL-ACNC: 23.3 U/ML
CANCER AG15-3 SERPL-ACNC: 25.8 U/ML
CANCER AG15-3 SERPL-ACNC: 26.5 U/ML
CANCER AG15-3 SERPL-ACNC: 28 U/ML
CEA SERPL-MCNC: 1.6 NG/ML
CEA SERPL-MCNC: 1.8 NG/ML
CEA SERPL-MCNC: 2.1 NG/ML
CEA SERPL-MCNC: 2.5 NG/ML
CHLORIDE SERPL-SCNC: 100 MMOL/L
CHLORIDE SERPL-SCNC: 102 MMOL/L
CHLORIDE SERPL-SCNC: 102 MMOL/L
CHLORIDE SERPL-SCNC: 99 MMOL/L
CO2 SERPL-SCNC: 25 MMOL/L
CO2 SERPL-SCNC: 29 MMOL/L
CREAT SERPL-MCNC: 0.8 MG/DL
CREAT SERPL-MCNC: 0.9 MG/DL
EGFR: 68 ML/MIN/1.73M2
EGFR: 78 ML/MIN/1.73M2
GLUCOSE SERPL-MCNC: 111 MG/DL
GLUCOSE SERPL-MCNC: 114 MG/DL
GLUCOSE SERPL-MCNC: 115 MG/DL
GLUCOSE SERPL-MCNC: 95 MG/DL
HCT VFR BLD CALC: 43.5 %
HCT VFR BLD CALC: 45 %
HCT VFR BLD CALC: 46.2 %
HGB BLD-MCNC: 15.4 G/DL
HGB BLD-MCNC: 15.6 G/DL
HGB BLD-MCNC: 15.7 G/DL
MCHC RBC-ENTMCNC: 29.4 PG
MCHC RBC-ENTMCNC: 29.8 PG
MCHC RBC-ENTMCNC: 30.6 PG
MCHC RBC-ENTMCNC: 34 G/DL
MCHC RBC-ENTMCNC: 34.2 G/DL
MCHC RBC-ENTMCNC: 35.9 G/DL
MCV RBC AUTO: 85.5 FL
MCV RBC AUTO: 86.5 FL
MCV RBC AUTO: 87.2 FL
PLATELET # BLD AUTO: 195 K/UL
PLATELET # BLD AUTO: 208 K/UL
PLATELET # BLD AUTO: 211 K/UL
PMV BLD: 10 FL
PMV BLD: 9.7 FL
PMV BLD: 9.9 FL
POTASSIUM SERPL-SCNC: 3.7 MMOL/L
POTASSIUM SERPL-SCNC: 3.9 MMOL/L
POTASSIUM SERPL-SCNC: 4.1 MMOL/L
POTASSIUM SERPL-SCNC: 4.3 MMOL/L
PROT SERPL-MCNC: 7 G/DL
PROT SERPL-MCNC: 7.1 G/DL
RBC # BLD: 5.09 M/UL
RBC # BLD: 5.16 M/UL
RBC # BLD: 5.34 M/UL
RBC # FLD: 13 %
RBC # FLD: 13.2 %
RBC # FLD: 13.2 %
SODIUM SERPL-SCNC: 137 MMOL/L
SODIUM SERPL-SCNC: 141 MMOL/L
SODIUM SERPL-SCNC: 141 MMOL/L
SODIUM SERPL-SCNC: 142 MMOL/L
WBC # FLD AUTO: 5.43 K/UL
WBC # FLD AUTO: 5.91 K/UL
WBC # FLD AUTO: 6.56 K/UL

## 2024-05-13 ENCOUNTER — APPOINTMENT (OUTPATIENT)
Dept: SURGERY | Facility: CLINIC | Age: 74
End: 2024-05-13
Payer: MEDICARE

## 2024-05-13 VITALS
TEMPERATURE: 97 F | WEIGHT: 190 LBS | DIASTOLIC BLOOD PRESSURE: 78 MMHG | HEIGHT: 66 IN | SYSTOLIC BLOOD PRESSURE: 136 MMHG | HEART RATE: 100 BPM | OXYGEN SATURATION: 98 % | BODY MASS INDEX: 30.53 KG/M2

## 2024-05-13 DIAGNOSIS — Z17.0 MALIGNANT NEOPLASM OF UPPER-INNER QUADRANT OF RIGHT FEMALE BREAST: ICD-10-CM

## 2024-05-13 DIAGNOSIS — C50.211 MALIGNANT NEOPLASM OF UPPER-INNER QUADRANT OF RIGHT FEMALE BREAST: ICD-10-CM

## 2024-05-13 DIAGNOSIS — N64.89 OTHER SPECIFIED DISORDERS OF BREAST: ICD-10-CM

## 2024-05-13 PROCEDURE — 99213 OFFICE O/P EST LOW 20 MIN: CPT

## 2024-05-14 PROBLEM — N64.89 RADIAL SCAR OF LEFT BREAST: Status: ACTIVE | Noted: 2022-09-21

## 2024-05-14 PROBLEM — C50.211 MALIGNANT NEOPLASM OF UPPER-INNER QUADRANT OF RIGHT BREAST IN FEMALE, ESTROGEN RECEPTOR POSITIVE: Status: ACTIVE | Noted: 2021-12-21

## 2024-05-14 NOTE — DATA REVIEWED
[FreeTextEntry1] : Reviewed prior office notes and breast imaging. Left screening mammogram from April 2024 was benign.

## 2024-05-14 NOTE — ASSESSMENT
[FreeTextEntry1] : Ms. Jesica Gonzalez is a 73-year-old female who returns to the office with her  for her scheduled breast cancer surveillance examination. Benign breast examination. She continues to do well. Her next left screening mammogram will be done in April 2025. She will return to the office in 4 months for re-exam, or sooner as needed. She is happy with the assessment and plan. All of her questions were answered.

## 2024-05-14 NOTE — PHYSICAL EXAM
[de-identified] : Healthy [de-identified] : No adenopathy [de-identified] : Right breast surgically absent, right chest wall is soft and flat with all wounds dry and healed and radiation related skin changes as expected.  Left breast is large and pendulous, with no nipple discharge, nipple retraction, or suspicious skin change.  LIQ incision is clean and dry, no new masses or suspicious areas noted.  No axillary adenopathy bilaterally. No upper extremity lymphedema noted bilaterally.

## 2024-05-14 NOTE — HISTORY OF PRESENT ILLNESS
[de-identified] : The patient returns to the office with her  for her scheduled breast cancer surveillance examination. She notes no new symptoms or changes in either breast. She remains on Letrozole with Dr. Daniel. She continues to follow regularly with Dr. Fritz from a colorectal surgery standpoint.  Right MRM and excision of left breast radial scar December 2022, following NAC per Dr. Daniel, and with postoperative right chest wall RT.

## 2024-05-22 ENCOUNTER — RESULT REVIEW (OUTPATIENT)
Age: 74
End: 2024-05-22

## 2024-05-22 ENCOUNTER — OUTPATIENT (OUTPATIENT)
Dept: OUTPATIENT SERVICES | Facility: HOSPITAL | Age: 74
LOS: 1 days | End: 2024-05-22
Payer: MEDICARE

## 2024-05-22 DIAGNOSIS — Z90.11 ACQUIRED ABSENCE OF RIGHT BREAST AND NIPPLE: Chronic | ICD-10-CM

## 2024-05-22 DIAGNOSIS — C18.7 MALIGNANT NEOPLASM OF SIGMOID COLON: ICD-10-CM

## 2024-05-22 DIAGNOSIS — Z00.8 ENCOUNTER FOR OTHER GENERAL EXAMINATION: ICD-10-CM

## 2024-05-22 DIAGNOSIS — Z95.828 PRESENCE OF OTHER VASCULAR IMPLANTS AND GRAFTS: Chronic | ICD-10-CM

## 2024-05-22 DIAGNOSIS — Z90.49 ACQUIRED ABSENCE OF OTHER SPECIFIED PARTS OF DIGESTIVE TRACT: Chronic | ICD-10-CM

## 2024-05-22 PROCEDURE — 74160 CT ABDOMEN W/CONTRAST: CPT

## 2024-05-22 PROCEDURE — 74177 CT ABD & PELVIS W/CONTRAST: CPT | Mod: 26,MH

## 2024-05-22 PROCEDURE — 74177 CT ABD & PELVIS W/CONTRAST: CPT

## 2024-05-23 DIAGNOSIS — C18.7 MALIGNANT NEOPLASM OF SIGMOID COLON: ICD-10-CM

## 2024-05-28 RX ORDER — METOPROLOL SUCCINATE 50 MG/1
50 TABLET, EXTENDED RELEASE ORAL DAILY
Qty: 90 | Refills: 2 | Status: ACTIVE | COMMUNITY
Start: 2021-12-22 | End: 1900-01-01

## 2024-06-17 NOTE — PHYSICAL THERAPY INITIAL EVALUATION ADULT - DISCHARGE DISPOSITION, PT EVAL
Please call pharmacy. PA encounter 6/6/24 indicates lorazepam 0.5 mg tabs was approved.     Chanel Main BSN, RN    no skilled PT needs

## 2024-07-09 ENCOUNTER — RESULT REVIEW (OUTPATIENT)
Age: 74
End: 2024-07-09

## 2024-07-09 ENCOUNTER — OUTPATIENT (OUTPATIENT)
Dept: OUTPATIENT SERVICES | Facility: HOSPITAL | Age: 74
LOS: 1 days | End: 2024-07-09
Payer: MEDICARE

## 2024-07-09 DIAGNOSIS — C18.7 MALIGNANT NEOPLASM OF SIGMOID COLON: ICD-10-CM

## 2024-07-09 DIAGNOSIS — Z95.828 PRESENCE OF OTHER VASCULAR IMPLANTS AND GRAFTS: Chronic | ICD-10-CM

## 2024-07-09 DIAGNOSIS — Z90.11 ACQUIRED ABSENCE OF RIGHT BREAST AND NIPPLE: Chronic | ICD-10-CM

## 2024-07-09 DIAGNOSIS — Z90.49 ACQUIRED ABSENCE OF OTHER SPECIFIED PARTS OF DIGESTIVE TRACT: Chronic | ICD-10-CM

## 2024-07-09 PROCEDURE — 74181 MRI ABDOMEN W/O CONTRAST: CPT

## 2024-07-09 PROCEDURE — 74181 MRI ABDOMEN W/O CONTRAST: CPT | Mod: 26,MH

## 2024-07-10 DIAGNOSIS — C18.7 MALIGNANT NEOPLASM OF SIGMOID COLON: ICD-10-CM

## 2024-07-24 ENCOUNTER — APPOINTMENT (OUTPATIENT)
Age: 74
End: 2024-07-24
Payer: MEDICARE

## 2024-07-24 ENCOUNTER — LABORATORY RESULT (OUTPATIENT)
Age: 74
End: 2024-07-24

## 2024-07-24 ENCOUNTER — OUTPATIENT (OUTPATIENT)
Dept: OUTPATIENT SERVICES | Facility: HOSPITAL | Age: 74
LOS: 1 days | End: 2024-07-24
Payer: MEDICARE

## 2024-07-24 VITALS
WEIGHT: 192 LBS | DIASTOLIC BLOOD PRESSURE: 130 MMHG | HEART RATE: 108 BPM | OXYGEN SATURATION: 97 % | HEIGHT: 66 IN | TEMPERATURE: 98.1 F | BODY MASS INDEX: 30.86 KG/M2 | RESPIRATION RATE: 16 BRPM | SYSTOLIC BLOOD PRESSURE: 180 MMHG

## 2024-07-24 DIAGNOSIS — D64.9 ANEMIA, UNSPECIFIED: ICD-10-CM

## 2024-07-24 DIAGNOSIS — C18.7 MALIGNANT NEOPLASM OF SIGMOID COLON: ICD-10-CM

## 2024-07-24 DIAGNOSIS — Z95.828 PRESENCE OF OTHER VASCULAR IMPLANTS AND GRAFTS: Chronic | ICD-10-CM

## 2024-07-24 DIAGNOSIS — Z90.49 ACQUIRED ABSENCE OF OTHER SPECIFIED PARTS OF DIGESTIVE TRACT: Chronic | ICD-10-CM

## 2024-07-24 DIAGNOSIS — Z90.11 ACQUIRED ABSENCE OF RIGHT BREAST AND NIPPLE: Chronic | ICD-10-CM

## 2024-07-24 DIAGNOSIS — Z51.81 ENCOUNTER FOR THERAPEUTIC DRUG LVL MONITORING: ICD-10-CM

## 2024-07-24 DIAGNOSIS — C50.211 MALIGNANT NEOPLASM OF UPPER-INNER QUADRANT OF RIGHT FEMALE BREAST: ICD-10-CM

## 2024-07-24 DIAGNOSIS — Z79.811 ENCOUNTER FOR THERAPEUTIC DRUG LVL MONITORING: ICD-10-CM

## 2024-07-24 DIAGNOSIS — Z17.0 MALIGNANT NEOPLASM OF UPPER-INNER QUADRANT OF RIGHT FEMALE BREAST: ICD-10-CM

## 2024-07-24 PROCEDURE — 85027 COMPLETE CBC AUTOMATED: CPT

## 2024-07-24 PROCEDURE — 99214 OFFICE O/P EST MOD 30 MIN: CPT

## 2024-07-24 PROCEDURE — 80048 BASIC METABOLIC PNL TOTAL CA: CPT

## 2024-07-24 PROCEDURE — 80076 HEPATIC FUNCTION PANEL: CPT

## 2024-07-24 PROCEDURE — 86300 IMMUNOASSAY TUMOR CA 15-3: CPT

## 2024-07-24 PROCEDURE — 82378 CARCINOEMBRYONIC ANTIGEN: CPT

## 2024-07-24 NOTE — HISTORY OF PRESENT ILLNESS
[de-identified] : 72 yo female was referred by Dr. Quinonez for consultation of breast cancer. Jesica initially presented to hospital for constipation and abdominal pain. CT a/p on 11/24/21 showed  large bowel obstruction secondary to a likely malignant stricture in the proximal sigmoid colon, small volume ascites and lobulated, partially visualized right breast mass and left breast nodule. Physical exam correlation and correlation with dedicated breast imaging was recommended as malignancy is of concern.\par  \par  On 11/26/21, CT chest showed right and left breast masses. The right breast mass measured 6.5 x 3.8 x 7.0 cm and contained small calcifications. The left breast mass measured 3.1 x 2.9 x 2.6 cm.\par  \par  On 11/29/21, colonoscopy revealed sigmoid colon mass and and biopsy showed invasive adenocarcinoma, moderately differentiated. MMR proteins were all expressed. 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 \par  \par  On 12/21/21, b/l breast dx mammo and US were performed which showed 10 cm mass at the 11 to 1:00 position 3 to 4 cm from the nipple corresponding to the biopsy-proven right breast carcinoma.  In the right axilla, at the 11:00 position 13 cm from the nipple, there is an abnormal lymph node measuring 2.3 x 1.3 x 0.9 cm. Ultrasound-guided biopsy is recommended. In the left breast, there are scattered similar appearing subcentimeter circumscribed masses seen throughout the left breast. These correspond to mammographically demonstrated masses:\par  At the 3:00 position 6 cm from the nipple, there is a mass measuring 0.4 x 0.3 x 0.3 cm. Ultrasound-guided biopsy of this mass is recommended.\par  At the 7 to 8:00 position 5 cm from the nipple, there is a mass measuring 0.4 x 0.3 x 0.2 cm.\par  At the 8 to 9:00 position 4 cm from the nipple, there is a circumscribed mass measuring 0.4 x 0.4 x 0.3 cm.\par  At the 8 to 9:00 position 3 cm from nipple, corresponding to area palpable concern and mammographic findings, there is a heterogeneous mass measuring 2.5 x 2.3 x 2.0 cm. Ultrasound-guided biopsy is recommended.\par  \par  On 12/30/21, left breast 3 N6 mass, ultrasound guided needle core biopsies revealed radial sclerosing lesion (radial scar) and Benign atrophic breast tissue with proliferative type fibrocystic changes. Left breast 8-9 N3 mass, ultrasound guided needle core biopsies showed single minute fragment of benign atrophic breast tissue.  \par  The right axillary mass, ultrasound guided needle core biopsies were positive for moderately differentiated adenocarcinoma, most likely representative of metastasis from the patient's known mammary primary carcinoma to an axillary lymph node with mervat replacement, obliteration, and extensive extracapsular extension.\par  \par  On 1/19/22, left breast relatively posterior calcifications, stereotactic guided vacuum assisted needle core biopsies showed radial sclerosing lesion (radial scar) and benign atrophic breast tissue with proliferative type fibrocystic changes. Left relatively anterior calcifications, stereotactic guided vacuum assisted needle core biopsies showed benign atrophic breast tissue with proliferative type fibrocystic changes.  \par  \par  On 1/5/22, she had b/l breast MRI which showed 8 cm biopsy-proven right breast index carcinoma and axillary mervat metastasis. Biopsy-proven left breast radial scar with no additional sites of suspicious enhancement.\par  \par  On 2/17/22, she underwent robotic sigmoidectomy and colostomy. The pathology revealed Invasive moderately differentiated adenocarcinoma (6 cm in greatest dimension), invading through muscularis propria into pericolorectal tissue (pT3), +LVI.  All margins were negative for invasive carcinoma.  Twenty two lymph nodes were negative for metastatic carcinoma. She recovered well from surgery. \par  \par  She is here today with her  to discuss breast cancer treatment. She has no family h/o breast cancer or any other kinds of cancer.  [de-identified] : 4/21/22: Jesica is here today for followup and chemotherapy. She has synchronous colon cancer and breast cancer: invasive moderately differentiated adenocarcinoma of the sigmoid colon, MMR proficient, s/p robotic sigmoidectomy and colostomy. 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 Clinical stage is cT3N1. Her staging CT and bone scan did not show evidence of distant mets. She is here today to start neoadjuvant chemotherapy for breast cancer.   5/9/22: Jesica is here today for followup and chemotherapy. She has synchronous colon cancer and breast cancer: invasive moderately differentiated adenocarcinoma of the sigmoid colon, MMR proficient, s/p robotic sigmoidectomy and colostomy. 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 Clinical stage is cT3N1. Her staging CT and bone scan did not show evidence of distant mets. She started on neoadjuvant chemotherapy for breast cancer. She had 1st cycle AC 2 weeks ago and tolerated well.   5/23/22 Jesica is here today for followup and chemotherapy. She has synchronous colon cancer and breast cancer: invasive moderately differentiated adenocarcinoma of the sigmoid colon, MMR proficient, s/p robotic sigmoidectomy and colostomy. 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 Clinical stage is cT3N1. Her staging CT and bone scan did not show evidence of distant mets. She started on neoadjuvant chemotherapy. To date, she has had 2 cycles and tolerated well. She did not have fever, mouth sore, vomiting or diarrhea.   6/6/22 Jesica is here today for followup and chemotherapy. She has synchronous colon cancer and breast cancer: invasive moderately differentiated adenocarcinoma of the sigmoid colon, MMR proficient, s/p robotic sigmoidectomy and colostomy. 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 Clinical stage is cT3N1. Her staging CT and bone scan did not show evidence of distant mets. She has been on neoadjuvant chemotherapy with dose dense AC. To date, she has had 3 cycles and tolerated well. She did not have fever, mouth sore, vomiting or diarrhea. She does endorse some burning/tearing sensation in the eye.   6/20/22: Jesica is here today for followup and chemotherapy. She has synchronous colon cancer and breast cancer: invasive moderately differentiated adenocarcinoma of the sigmoid colon, MMR proficient, s/p robotic sigmoidectomy and colostomy. 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 Clinical stage is cT3N1. Her staging CT and bone scan did not show evidence of distant mets. She has been on neoadjuvant chemotherapy. She completed dose dense AC x 4. She feels more fatigue. Her eye burning is getting better.  7/11/22 Jesica is here for follow up and chemotherapy.  She has synchronous colon cancer and breast cancer: invasive moderately differentiated adenocarcinoma of the sigmoid colon, MMR proficient, s/p robotic sigmoidectomy and colostomy. 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 Clinical stage is cT3N1. Her staging CT and bone scan did not show evidence of distant mets. She has been on neoadjuvant chemotherapy. She completed dose dense AC x 4. She completed 3 doses of weekly Taxol so far. She feels well. Her energy level is better and burning of the eyes is resolved. She followed up with Colorectal sx and they want to wait until Taxol is finished for her chemotherapy.  09/7/22 Jesica is here for follow up and chemotherapy.  She has synchronous colon cancer and breast cancer: invasive moderately differentiated adenocarcinoma of the sigmoid colon, MMR proficient, s/p robotic sigmoidectomy and colostomy. 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 Clinical stage is cT3N1. Her staging CT and bone scan did not show evidence of distant mets. She has been on neoadjuvant chemotherapy. She completed dose dense AC x 4. Currently, she is on weekly Taxol. To date, she has had 11 treatments. She tolerated chemotherapy well.   10/5/22 Jesica is here for follow up. She has synchronous colon cancer and breast cancer: invasive moderately differentiated adenocarcinoma of the sigmoid colon, MMR proficient, s/p robotic sigmoidectomy and colostomy. 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 Clinical stage is cT3N1. Her staging CT and bone scan did not show evidence of distant mets. She completed neoadjuvant chemotherapy with dose dense AC x 4 and weekly Taxol x 12. She started letrozole on 9/15 and tolerating well. She had breast MRI 9/22 showed biopsy-proven malignant right breast mass with associated skin thickening. Interval reduction in size of prior noted prominent right axillary lymph node likely treatment related. No MRI evidence of malignancy in the left breast. She also had restaging CT C/A/P which showed Unchanged bilateral few pulmonary nodules measuring up to 4 mm. Interval decrease in size of previously enlarged right axillary lymph node. Indeterminate hyperenhancing 1.5 cm right hepatic lobe lesion. MRI abdomen on 9/25/22 showed Hyperenhancing right hepatic lobe lesion, demonstrating restricted diffusion, indeterminate. Metastasis is on differential. She is pending biopsy of liver lesion.   11/2/22: Jesica is here for follow up. She has synchronous colon cancer and breast cancer: invasive moderately differentiated adenocarcinoma of the sigmoid colon, MMR proficient, s/p robotic sigmoidectomy and colostomy. 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 Clinical stage is cT3N1. She completed neoadjuvant chemotherapy with dose dense AC x 4 and weekly Taxol x 12. She started letrozole on 9/15 and tolerating well.  She had restaging CT C/A/P which showed Unchanged bilateral few pulmonary nodules measuring up to 4 mm. Interval decrease in size of previously enlarged right axillary lymph node. Indeterminate hyperenhancing 1.5 cm right hepatic lobe lesion. MRI abdomen on 9/25/22 showed Hyperenhancing right hepatic lobe lesion, demonstrating restricted diffusion, indeterminate. On 10/13/22, she had CT guided bx of the right liver lesion which is Negative for malignancy. The pathology showed focally distorted hepatic parenchyma. Differential diagnosis include nodular regenerative hyperplasia, focal nodular hyperplasia and hepatocellular adenoma.   12/28/22: Jesica is here for follow up. She has synchronous colon cancer and breast cancer: invasive moderately differentiated adenocarcinoma of the sigmoid colon, MMR proficient, s/p robotic sigmoidectomy and colostomy.   Patient reports feeling well, Right breast healing very well, no discharges.   On 12/8/22 S/P Right Modified radical mastectomy. left breast lumpectomy  Post surgical Pathology report : Final Diagnosis 1.  Breast, left anterior core biopsy site, radiofrequency seed localized lumpectomy: - Radial sclerosing lesion (radial scar) located adjacent to a healing prior biopsy site. - Benign atrophic breast tissue with fibrocystic changes, both proliferative and non-proliferative types, associated with numerous microcalcifications.  2.  Breast, left posterior core biopsy site, radiofrequency seed localized lumpectomy: - Radial sclerosing lesion (radial scar). - Benign atrophic breast tissue with fibrocystic changes, both proliferative and non-proliferative types, associated with microcalcifications. - Healing prior biopsy site.  01/25/2023: Jesica is here for follow up. She has synchronous colon cancer and breast cancer, stage is IIA ([id number]) invasive moderately differentiated adenocarcinoma of the sigmoid colon, MMR proficient, s/p robotic sigmoidectomy and colostomy, and invasive well to moderately differentiated ductal carcinoma of the right breast ER/PA positive and Her-2 negative, Clinical stage is cT3N1, s/p neoadjuvant chemo with AC-T followed right MRM and left breast lumpectomy on 12/8/22. The right mastectomy showed 1.4 cm residual invasive well differentiated ductal carcinoma (colloid carcinoma), %, PA neg, KI 67 2% and Her-2 negative. There are metastatic carcinoma present in three of twenty-eight axillary lymph nodes (3/28), the largest focus of which measures 3.5 mm, +JESSICA.  AJCC 8th Edition Pathologic Stage: ypT1c, ypN1a, ypMx, ypPR (pathologic partial response to systemic neoadjuvant chemotherapy). She has been taking Letrozole daily and tolerated well. She saw Dr. Miller and started on PMRT.   03/22/2023 Jesica is here for follow up. She has synchronous colon cancer and breast cancer, stage is IIA ([id number]) invasive moderately differentiated adenocarcinoma of the sigmoid colon, MMR proficient, s/p robotic sigmoidectomy and colostomy, and invasive well to moderately differentiated ductal carcinoma of the right breast ER/PA positive and Her-2 negative, Clinical stage is cT3N1, s/p neoadjuvant chemo with AC-T followed right MRM and left breast lumpectomy on 12/8/22. The right mastectomy showed 1.4 cm residual invasive well differentiated ductal carcinoma (colloid carcinoma), %, PA neg, KI 67 2% and Her-2 negative. three of twenty-eight axillary lymph nodes (3/28) were positive, +JESSICA.  AJCC 8th Edition Pathologic Stage: ypT1c, ypN1a, ypMx, ypPR. She has been taking Letrozole daily and tolerated well. She saw Dr. Miller and completed PMRT on 03/01/2023. She still has skin reaction at radiation site.   06/14/2023 Jesica is here for follow up. She has synchronous colon cancer and breast cancer, stage is IIA ([id number]) invasive moderately differentiated adenocarcinoma of the sigmoid colon, MMR proficient, s/p robotic sigmoidectomy and colostomy, and invasive well to moderately differentiated ductal carcinoma of the right breast ER/PA positive and Her-2 negative, Clinical stage is cT3N1, s/p neoadjuvant chemo with AC-T followed right MRM and left breast lumpectomy on 12/8/22. The right mastectomy showed 1.4 cm residual invasive well differentiated ductal carcinoma (colloid carcinoma), %, PA neg, KI 67 2% and Her-2 negative. three of twenty-eight axillary lymph nodes (3/28) were positive, +JESSICA.  AJCC 8th Edition Pathologic Stage: ypT1c, ypN1a, ypMx, ypPR. She has been taking Letrozole daily since 9/2022 and tolerated well. She saw Dr. Miller and completed PMRT on 03/01/2023. She had CT CAP in 4/2023 which showed stable pulmonary nodules, largest within the right lower lobe measuring up to 4 mm.  No definite sites of abdominopelvic metastatic disease. Previously described indeterminate hyperenhancing liver segment VIII lesion is less conspicuous, and may have decreased in size, now measuring up to 1 cm, previously 1.5 cm. She had Left breast mammogram 4/2023 Postsurgical changes of the left breast. Stable benign masses. She had surveillance colonoscopy in 4/2023 polypectomy which was benign, next colonoscopy due in 3 years. She had port removed 5/2023.  9/20/23: Jesica is here for follow up. She has synchronous colon cancer and breast cancer, stage is IIA ([id number]) invasive moderately differentiated adenocarcinoma of the sigmoid colon, MMR proficient, s/p robotic sigmoidectomy and colostomy, and invasive well to moderately differentiated ductal carcinoma of the right breast ER/PA positive and Her-2 negative, Clinical stage is cT3N1, s/p neoadjuvant chemo with AC-T followed right MRM and left breast lumpectomy on 12/8/22. The right mastectomy showed 1.4 cm residual invasive well differentiated ductal carcinoma (colloid carcinoma), %, PA neg, KI 67 2% and Her-2 negative. three of twenty-eight axillary lymph nodes (3/28) were positive, +JESSICA.  AJCC 8th Edition Pathologic Stage: ypT1c, ypN1a, ypMx, ypPR. She has been taking Letrozole daily since 9/2022 and tolerated well. She saw Dr. Miller and completed PMRT on 03/01/2023. She had CT CAP in 4/2023 which showed stable pulmonary nodules, largest within the right lower lobe measuring up to 4 mm.  No definite sites of abdominopelvic metastatic disease. Previously described indeterminate hyperenhancing liver segment VIII lesion is less conspicuous, and may have decreased in size, now measuring up to 1 cm, previously 1.5 cm. She had Left breast mammogram 4/2023 which showed postsurgical changes of the left breast. Stable benign masses. She had surveillance colonoscopy in 4/2023 polypectomy which was benign, next colonoscopy due in 3 years. She is feeling well and does not have new complains.   4/10/24 Jesica is here for follow up, accopanied by spouse. She has synchronous colon cancer and breast cancer, stage is IIA ([id number]) invasive moderately differentiated adenocarcinoma of the sigmoid colon, MMR proficient, s/p robotic sigmoidectomy and colostomy, and invasive well to moderately differentiated ductal carcinoma of the right breast ER/PA positive and Her-2 negative, Clinical stage is cT3N1, s/p neoadjuvant chemo with AC-T followed right MRM and left breast lumpectomy on 12/8/22. The right mastectomy showed 1.4 cm residual invasive well differentiated ductal carcinoma (colloid carcinoma), %, PA neg, KI 67 2% and Her-2 negative. three of twenty-eight axillary lymph nodes (3/28) were positive, +JESSICA.  AJCC 8th Edition Pathologic Stage: ypT1c, ypN1a, ypMx, ypPR. She has been taking Letrozole daily since 9/2022, tolerating well. She completed PMRT on 03/01/2023. She had CT CAP in 4/2023 which showed stable pulmonary nodules, largest within the right lower lobe measuring up to 4 mm.  No definite sites of abdominopelvic metastatic disease. Previously described indeterminate hyperenhancing liver segment VIII lesion is less conspicuous, and may have decreased in size, now measuring up to 1 cm, previously 1.5 cm. CT CAP on 10/9/23 Interval development of peripheral 1.1 x 8 mm pleural-based opacity , right upper lobe (302/118). Follow-up CT scan 3 months time recommended. Multiple stable micronodules. Since April 6, 2023, previously noted enhancing right hepatic lesion is not definitively visualized. 2.  Post sigmoid colectomy with unremarkable appearance of the colonic anastomosis; no definite evidence of metastatic disease in the abdomen or pelvis. Most recent CT chest on 1/24/24 Interval resolution of prior right upper lobe pleural-based opacity. Scattered micronodules are essentially unchanged including largest right lower lobe 4 mm and left upper lobe 3 mm pulmonary nodules. She had Left breast mammogram 4/2023 which showed postsurgical changes of the left breast. Stable benign masses. She had surveillance colonoscopy in 4/2023 with polypectomy which was benign, next colonoscopy due in 2025. She is feeling well and does not have new complaints.   7/24/24: Jesica is here for follow up, accopanied by spouse. She has synchronous colon cancer and breast cancer, stage is IIA ([id number]) invasive moderately differentiated adenocarcinoma of the sigmoid colon, MMR proficient, s/p robotic sigmoidectomy and colostomy, and invasive well to moderately differentiated ductal carcinoma of the right breast ER/PA positive and Her-2 negative, Clinical stage is cT3N1, s/p neoadjuvant chemo with AC-T followed right MRM and left breast lumpectomy on 12/8/22. The right mastectomy showed 1.4 cm residual invasive well differentiated ductal carcinoma (colloid carcinoma), %, PA neg, KI 67 2% and Her-2 negative. three of twenty-eight axillary lymph nodes (3/28) were positive, +JESSICA.  AJCC 8th Edition Pathologic Stage: ypT1c, ypN1a, ypMx, ypPR. She has been taking Letrozole daily since 9/2022, tolerating well. She completed PMRT on 03/01/2023.  She had surveillance colonoscopy in 4/2023 with polypectomy which was benign, next colonoscopy due in 2025. She is feeling well and does not have new complaints.

## 2024-07-24 NOTE — PHYSICAL EXAM
[Fully active, able to carry on all pre-disease performance without restriction] : Status 0 - Fully active, able to carry on all pre-disease performance without restriction [Normal] : affect appropriate [de-identified] : Right breast s/p mastectomy. Left breast s/p lumpectomy. Chest and breast exam today is unrevelaing. No palpable abnormality noted. [de-identified] : + Ventral hernia.

## 2024-07-24 NOTE — CONSULT LETTER
[Dear  ___] : Dear  [unfilled], [Courtesy Letter:] : I had the pleasure of seeing your patient, [unfilled], in my office today. [Please see my note below.] : Please see my note below. [Sincerely,] : Sincerely, [FreeTextEntry3] : Ygoesh Daniel MD [DrLexis  ___] : Dr. SALES [DrLexis ___] : Dr. SALES

## 2024-07-25 DIAGNOSIS — D64.9 ANEMIA, UNSPECIFIED: ICD-10-CM

## 2024-07-26 LAB
ALBUMIN SERPL ELPH-MCNC: 4.8 G/DL
ALP BLD-CCNC: 98 U/L
ALT SERPL-CCNC: 11 U/L
ANION GAP SERPL CALC-SCNC: 16 MMOL/L
AST SERPL-CCNC: 19 U/L
BILIRUB DIRECT SERPL-MCNC: 0.2 MG/DL
BILIRUB INDIRECT SERPL-MCNC: 0.5 MG/DL
BILIRUB SERPL-MCNC: 0.7 MG/DL
BUN SERPL-MCNC: 17 MG/DL
CALCIUM SERPL-MCNC: 10.5 MG/DL
CANCER AG15-3 SERPL-ACNC: 27.2 U/ML
CEA SERPL-MCNC: 1.8 NG/ML
CHLORIDE SERPL-SCNC: 99 MMOL/L
CO2 SERPL-SCNC: 24 MMOL/L
CREAT SERPL-MCNC: 0.8 MG/DL
EGFR: 78 ML/MIN/1.73M2
GLUCOSE SERPL-MCNC: 111 MG/DL
HCT VFR BLD CALC: 46.9 %
HGB BLD-MCNC: 16 G/DL
MCHC RBC-ENTMCNC: 29.7 PG
MCHC RBC-ENTMCNC: 34.1 G/DL
MCV RBC AUTO: 87 FL
PLATELET # BLD AUTO: 215 K/UL
PMV BLD: 9.9 FL
POTASSIUM SERPL-SCNC: 4.2 MMOL/L
PROT SERPL-MCNC: 7.6 G/DL
RBC # BLD: 5.39 M/UL
RBC # FLD: 13 %
SODIUM SERPL-SCNC: 139 MMOL/L
WBC # FLD AUTO: 6.02 K/UL

## 2024-08-20 ENCOUNTER — APPOINTMENT (OUTPATIENT)
Dept: CARDIOLOGY | Facility: CLINIC | Age: 74
End: 2024-08-20
Payer: MEDICARE

## 2024-08-20 VITALS — DIASTOLIC BLOOD PRESSURE: 80 MMHG | RESPIRATION RATE: 18 BRPM | HEART RATE: 92 BPM | SYSTOLIC BLOOD PRESSURE: 140 MMHG

## 2024-08-20 VITALS — WEIGHT: 194 LBS | BODY MASS INDEX: 31.18 KG/M2 | HEIGHT: 66 IN

## 2024-08-20 DIAGNOSIS — R73.03 PREDIABETES.: ICD-10-CM

## 2024-08-20 DIAGNOSIS — C50.919 MALIGNANT NEOPLASM OF UNSPECIFIED SITE OF UNSPECIFIED FEMALE BREAST: ICD-10-CM

## 2024-08-20 DIAGNOSIS — E78.2 MIXED HYPERLIPIDEMIA: ICD-10-CM

## 2024-08-20 DIAGNOSIS — C18.7 MALIGNANT NEOPLASM OF SIGMOID COLON: ICD-10-CM

## 2024-08-20 DIAGNOSIS — I10 ESSENTIAL (PRIMARY) HYPERTENSION: ICD-10-CM

## 2024-08-20 DIAGNOSIS — R00.0 TACHYCARDIA, UNSPECIFIED: ICD-10-CM

## 2024-08-20 PROCEDURE — 93000 ELECTROCARDIOGRAM COMPLETE: CPT

## 2024-08-20 PROCEDURE — G2211 COMPLEX E/M VISIT ADD ON: CPT

## 2024-08-20 PROCEDURE — 99214 OFFICE O/P EST MOD 30 MIN: CPT

## 2024-08-20 NOTE — PHYSICAL EXAM

## 2024-09-10 NOTE — H&P PST ADULT - MEDICATION ADMINISTRATION INFO, PROFILE
[phone] [id number])  Therapeutic Activity (66395)  Therapeutic Exercise (20776)  Manual Therapy (42600)  Heat/Cold (99815)  Electrical Stimulation Unattended (95791 or )  Ultrasound/Phonophoresis (47217)  Gait Training (46723)  Aquatic Therapy (56987)    no concerns

## 2024-09-16 ENCOUNTER — APPOINTMENT (OUTPATIENT)
Dept: SURGERY | Facility: CLINIC | Age: 74
End: 2024-09-16
Payer: MEDICARE

## 2024-09-16 VITALS
HEIGHT: 66 IN | SYSTOLIC BLOOD PRESSURE: 142 MMHG | BODY MASS INDEX: 31.18 KG/M2 | WEIGHT: 194 LBS | OXYGEN SATURATION: 99 % | HEART RATE: 78 BPM | DIASTOLIC BLOOD PRESSURE: 90 MMHG

## 2024-09-16 DIAGNOSIS — Z17.0 MALIGNANT NEOPLASM OF UPPER-INNER QUADRANT OF RIGHT FEMALE BREAST: ICD-10-CM

## 2024-09-16 DIAGNOSIS — N64.89 OTHER SPECIFIED DISORDERS OF BREAST: ICD-10-CM

## 2024-09-16 DIAGNOSIS — C50.211 MALIGNANT NEOPLASM OF UPPER-INNER QUADRANT OF RIGHT FEMALE BREAST: ICD-10-CM

## 2024-09-16 PROCEDURE — 99213 OFFICE O/P EST LOW 20 MIN: CPT

## 2024-09-16 NOTE — HISTORY OF PRESENT ILLNESS
[de-identified] : The patient returns to the office with her  for her scheduled breast cancer surveillance examination. She notes no new symptoms or changes in either breast. She remains on Letrozole with Dr. Daniel. She continues to follow regularly with Dr. Fritz from a colorectal surgery standpoint.  Right MRM and excision of left breast radial scar December 2022, following NAC per Dr. Daniel, and with postoperative right chest wall RT.

## 2024-09-16 NOTE — ASSESSMENT
[FreeTextEntry1] : Ms. Jesica Gonzlaez is a 73-year-old female who returns to the office with her  for her scheduled breast cancer surveillance examination.   Benign breast examination. She continues to do well. Her next left screening mammogram will be done in April 2025. An appropriate requisition was provided. She will return to the office in 6 months for re-exam, or sooner as needed. She is happy with the assessment and plan. All of her questions were answered.

## 2024-09-16 NOTE — PHYSICAL EXAM
[de-identified] : Healthy [de-identified] : No adenopathy [de-identified] : Right breast surgically absent, right chest wall is soft and flat with all wounds dry and healed and radiation related skin changes as expected.  Left breast is large and pendulous, with no nipple discharge, nipple retraction, or suspicious skin change.  LIQ incision is clean and dry, no new masses or suspicious areas noted.  No axillary adenopathy bilaterally. No upper extremity lymphedema noted bilaterally.

## 2024-09-18 ENCOUNTER — APPOINTMENT (OUTPATIENT)
Dept: SURGERY | Facility: CLINIC | Age: 74
End: 2024-09-18

## 2024-09-18 VITALS
BODY MASS INDEX: 31.18 KG/M2 | WEIGHT: 194 LBS | OXYGEN SATURATION: 97 % | HEIGHT: 66 IN | HEART RATE: 99 BPM | SYSTOLIC BLOOD PRESSURE: 124 MMHG | DIASTOLIC BLOOD PRESSURE: 86 MMHG

## 2024-09-18 DIAGNOSIS — C18.7 MALIGNANT NEOPLASM OF SIGMOID COLON: ICD-10-CM

## 2024-09-18 PROCEDURE — G2211 COMPLEX E/M VISIT ADD ON: CPT

## 2024-09-18 PROCEDURE — 99213 OFFICE O/P EST LOW 20 MIN: CPT

## 2024-09-18 NOTE — HISTORY OF PRESENT ILLNESS
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 Pathology showed T3N0 (0/22LN) moderately differentiated adenocarcinoma with LVI. She is tolerating a diet and having daily BM. Patient is currently undergoing surveillance of her Colon Cancer. She did not need adjuvant chemotherapy. Patients most recent CEA level was 1.8 in July 2024. Most recent CT of the chest was in January 2024, which showed no evidence of metastatic disease. Most recent CT of the abdomen and pelvis was in May 2024, which showed no evidence of metastatic disease. Patient continues to do well. She is tolerating a diet. Patient denies fevers, chills, nausea, vomiting, abdominal pain, constipation, diarrhea, blood in his stool or unexpected weight loss. Patient denies a family history of colon cancer rectal cancer or inflammatory bowel disease. Patient's most recent colonoscopy was April 13th, 2023 by Dr. Bean which showed hemorrhoids and a small tubular adenoma.

## 2024-09-18 NOTE — ADDENDUM
[FreeTextEntry1] :  I, Jessica Rivera ([facility]) assisted in filling out this chart under the dictation of Roland Fritz on 09/18/2024

## 2024-09-18 NOTE — ASSESSMENT
[FreeTextEntry1] : 73-year-old female with concurrent breast and obstructing colon cancer, s/p diverting loop colostomy. Now s/p robotic sigmoidectomy, colostomy closure.   The patient continues to do well. She will follow up with Dr. Daniel, for images and blood work. She'll follow up with Dr. Bean, for future colonoscopies. We will see her back in six months

## 2024-09-18 NOTE — END OF VISIT
[FreeTextEntry3] : Documented by Jessica Rivera acting as a scribe for Roland Fritz on 09/18/2024   All medical record entries made by the Scribe were at my, Dr. Roland Fritz direction and personally dictated by me on 09/18/2024 . I have reviewed the chart and agree that the record accurately reflects my personal performance of the history, physical exam, assessment and plan. I have also personally directed, reviewed, and agreed with the chart.

## 2024-09-18 NOTE — PHYSICAL EXAM
[FreeTextEntry1] : Gastrointestinal: No abdominal masses. No abdominal tenderness. Soft, non tender, non distended. Well-healed incisions. RUQ stoma site healed. No erythema induration or purulence. Liver: no hepatosplenomegaly. General Appearance: awake, alert, and in no acute distress. Respiratory: normal respiratory effort. Cardiovascular: normal rate and rhythm.

## 2024-09-30 ENCOUNTER — RESULT REVIEW (OUTPATIENT)
Age: 74
End: 2024-09-30

## 2024-09-30 ENCOUNTER — OUTPATIENT (OUTPATIENT)
Dept: OUTPATIENT SERVICES | Facility: HOSPITAL | Age: 74
LOS: 1 days | End: 2024-09-30
Payer: MEDICARE

## 2024-09-30 DIAGNOSIS — Z00.8 ENCOUNTER FOR OTHER GENERAL EXAMINATION: ICD-10-CM

## 2024-09-30 DIAGNOSIS — Z90.11 ACQUIRED ABSENCE OF RIGHT BREAST AND NIPPLE: Chronic | ICD-10-CM

## 2024-09-30 DIAGNOSIS — Z95.828 PRESENCE OF OTHER VASCULAR IMPLANTS AND GRAFTS: Chronic | ICD-10-CM

## 2024-09-30 DIAGNOSIS — Z13.820 ENCOUNTER FOR SCREENING FOR OSTEOPOROSIS: ICD-10-CM

## 2024-09-30 DIAGNOSIS — Z90.49 ACQUIRED ABSENCE OF OTHER SPECIFIED PARTS OF DIGESTIVE TRACT: Chronic | ICD-10-CM

## 2024-09-30 PROCEDURE — 77080 DXA BONE DENSITY AXIAL: CPT | Mod: 26

## 2024-09-30 PROCEDURE — 77080 DXA BONE DENSITY AXIAL: CPT

## 2024-10-01 DIAGNOSIS — Z13.820 ENCOUNTER FOR SCREENING FOR OSTEOPOROSIS: ICD-10-CM

## 2024-10-04 DIAGNOSIS — M81.0 AGE-RELATED OSTEOPOROSIS WITHOUT CURRENT PATHOLOGICAL FRACTURE: ICD-10-CM

## 2024-10-07 ENCOUNTER — RX RENEWAL (OUTPATIENT)
Age: 74
End: 2024-10-07

## 2024-10-15 NOTE — ED PROVIDER NOTE - PROVIDER TOKENS
Presented to ED c/o left bottom dental pain since Sunday.
PROVIDER:[TOKEN:[[id number]],FOLLOWUP:[1-3 Days]],PROVIDER:[TOKEN:[[id number]],FOLLOWUP:[1-3 Days]],PROVIDER:[TOKEN:[[id number]],FOLLOWUP:[1-3 Days]]

## 2024-10-22 ENCOUNTER — RX RENEWAL (OUTPATIENT)
Age: 74
End: 2024-10-22

## 2024-10-22 NOTE — PATIENT PROFILE ADULT - NSPROPTRIGHTBILLOFRIGHTS_GEN_A_NUR
I met with the pt and her daughter Ana today to discuss free drug assistance and go over updates on her insurance. Pt signed free drug pw and it has been submitted to [employer] for review. Her insurance isn't active at this time, but they left me with her new member Id which I forwarded over to [employer] to check for active status to obtain auth for treatments going forward with her new health plan. Free drug case is pending.   
patient

## 2025-01-30 ENCOUNTER — APPOINTMENT (OUTPATIENT)
Age: 75
End: 2025-01-30

## 2025-02-18 ENCOUNTER — APPOINTMENT (OUTPATIENT)
Dept: CARDIOLOGY | Facility: CLINIC | Age: 75
End: 2025-02-18
Payer: MEDICARE

## 2025-02-18 VITALS — SYSTOLIC BLOOD PRESSURE: 130 MMHG | RESPIRATION RATE: 18 BRPM | DIASTOLIC BLOOD PRESSURE: 80 MMHG | HEART RATE: 103 BPM

## 2025-02-18 VITALS — WEIGHT: 190 LBS | BODY MASS INDEX: 30.53 KG/M2 | HEIGHT: 66 IN

## 2025-02-18 DIAGNOSIS — R00.0 TACHYCARDIA, UNSPECIFIED: ICD-10-CM

## 2025-02-18 DIAGNOSIS — E78.2 MIXED HYPERLIPIDEMIA: ICD-10-CM

## 2025-02-18 DIAGNOSIS — R73.03 PREDIABETES.: ICD-10-CM

## 2025-02-18 DIAGNOSIS — I10 ESSENTIAL (PRIMARY) HYPERTENSION: ICD-10-CM

## 2025-02-18 PROCEDURE — 93000 ELECTROCARDIOGRAM COMPLETE: CPT

## 2025-02-18 PROCEDURE — 99214 OFFICE O/P EST MOD 30 MIN: CPT

## 2025-02-18 PROCEDURE — G2211 COMPLEX E/M VISIT ADD ON: CPT

## 2025-03-17 ENCOUNTER — APPOINTMENT (OUTPATIENT)
Dept: SURGERY | Facility: CLINIC | Age: 75
End: 2025-03-17

## 2025-03-19 ENCOUNTER — APPOINTMENT (OUTPATIENT)
Dept: SURGERY | Facility: CLINIC | Age: 75
End: 2025-03-19
Payer: MEDICARE

## 2025-03-19 ENCOUNTER — APPOINTMENT (OUTPATIENT)
Dept: SURGERY | Facility: CLINIC | Age: 75
End: 2025-03-19

## 2025-03-19 VITALS
BODY MASS INDEX: 30.53 KG/M2 | DIASTOLIC BLOOD PRESSURE: 78 MMHG | TEMPERATURE: 97 F | HEART RATE: 76 BPM | OXYGEN SATURATION: 98 % | HEIGHT: 66 IN | SYSTOLIC BLOOD PRESSURE: 126 MMHG | WEIGHT: 190 LBS

## 2025-03-19 DIAGNOSIS — C18.7 MALIGNANT NEOPLASM OF SIGMOID COLON: ICD-10-CM

## 2025-03-19 DIAGNOSIS — K56.609 UNSPECIFIED INTESTINAL OBSTRUCTION, UNSPECIFIED AS TO PARTIAL VERSUS COMPLETE OBSTRUCTION: ICD-10-CM

## 2025-03-19 PROCEDURE — 99213 OFFICE O/P EST LOW 20 MIN: CPT

## 2025-03-24 ENCOUNTER — APPOINTMENT (OUTPATIENT)
Dept: SURGERY | Facility: CLINIC | Age: 75
End: 2025-03-24
Payer: MEDICARE

## 2025-03-24 VITALS
HEART RATE: 88 BPM | DIASTOLIC BLOOD PRESSURE: 84 MMHG | TEMPERATURE: 97 F | WEIGHT: 190 LBS | HEIGHT: 66 IN | OXYGEN SATURATION: 98 % | BODY MASS INDEX: 30.53 KG/M2 | SYSTOLIC BLOOD PRESSURE: 140 MMHG

## 2025-03-24 DIAGNOSIS — Z17.0 MALIGNANT NEOPLASM OF UNSPECIFIED SITE OF RIGHT FEMALE BREAST: ICD-10-CM

## 2025-03-24 DIAGNOSIS — C50.919 MALIGNANT NEOPLASM OF UNSPECIFIED SITE OF UNSPECIFIED FEMALE BREAST: ICD-10-CM

## 2025-03-24 DIAGNOSIS — C50.911 MALIGNANT NEOPLASM OF UNSPECIFIED SITE OF RIGHT FEMALE BREAST: ICD-10-CM

## 2025-03-24 DIAGNOSIS — K43.2 INCISIONAL HERNIA W/OUT OBSTRUCTION OR GANGRENE: ICD-10-CM

## 2025-03-24 DIAGNOSIS — N64.89 OTHER SPECIFIED DISORDERS OF BREAST: ICD-10-CM

## 2025-03-24 PROCEDURE — 99214 OFFICE O/P EST MOD 30 MIN: CPT

## 2025-03-25 PROBLEM — K43.2 INCISIONAL HERNIA OF ANTERIOR ABDOMINAL WALL WITHOUT OBSTRUCTION OR GANGRENE: Status: ACTIVE | Noted: 2025-03-25

## 2025-04-14 ENCOUNTER — APPOINTMENT (OUTPATIENT)
Age: 75
End: 2025-04-14
Payer: MEDICARE

## 2025-04-14 ENCOUNTER — OUTPATIENT (OUTPATIENT)
Dept: OUTPATIENT SERVICES | Facility: HOSPITAL | Age: 75
LOS: 1 days | End: 2025-04-14
Payer: MEDICARE

## 2025-04-14 VITALS
HEART RATE: 108 BPM | OXYGEN SATURATION: 99 % | RESPIRATION RATE: 19 BRPM | HEIGHT: 66 IN | WEIGHT: 183 LBS | BODY MASS INDEX: 29.41 KG/M2 | TEMPERATURE: 98 F

## 2025-04-14 DIAGNOSIS — Z90.49 ACQUIRED ABSENCE OF OTHER SPECIFIED PARTS OF DIGESTIVE TRACT: Chronic | ICD-10-CM

## 2025-04-14 DIAGNOSIS — C50.911 MALIGNANT NEOPLASM OF UNSPECIFIED SITE OF RIGHT FEMALE BREAST: ICD-10-CM

## 2025-04-14 DIAGNOSIS — Z51.81 ENCOUNTER FOR THERAPEUTIC DRUG LVL MONITORING: ICD-10-CM

## 2025-04-14 DIAGNOSIS — R91.8 OTHER NONSPECIFIC ABNORMAL FINDING OF LUNG FIELD: ICD-10-CM

## 2025-04-14 DIAGNOSIS — Z79.811 ENCOUNTER FOR THERAPEUTIC DRUG LVL MONITORING: ICD-10-CM

## 2025-04-14 DIAGNOSIS — Z90.11 ACQUIRED ABSENCE OF RIGHT BREAST AND NIPPLE: Chronic | ICD-10-CM

## 2025-04-14 DIAGNOSIS — Z95.828 PRESENCE OF OTHER VASCULAR IMPLANTS AND GRAFTS: Chronic | ICD-10-CM

## 2025-04-14 DIAGNOSIS — Z17.0 MALIGNANT NEOPLASM OF UNSPECIFIED SITE OF RIGHT FEMALE BREAST: ICD-10-CM

## 2025-04-14 DIAGNOSIS — C18.7 MALIGNANT NEOPLASM OF SIGMOID COLON: ICD-10-CM

## 2025-04-14 LAB
ALBUMIN SERPL ELPH-MCNC: 4.6 G/DL
ALP BLD-CCNC: 95 U/L
ALT SERPL-CCNC: 11 U/L
ANION GAP SERPL CALC-SCNC: 15 MMOL/L
AST SERPL-CCNC: 18 U/L
AUTO BASOPHILS #: 0.05 K/UL
AUTO BASOPHILS %: 0.8 %
AUTO EOSINOPHILS #: 0.13 K/UL
AUTO EOSINOPHILS %: 2 %
AUTO IMMATURE GRANULOCYTES #: 0.02 K/UL
AUTO LYMPHOCYTES #: 0.85 K/UL
AUTO LYMPHOCYTES %: 13.2 %
AUTO MONOCYTES #: 0.37 K/UL
AUTO MONOCYTES %: 5.7 %
AUTO NEUTROPHILS #: 5.04 K/UL
AUTO NEUTROPHILS %: 78 %
AUTO NRBC #: 0 K/UL
BILIRUB DIRECT SERPL-MCNC: 0.2 MG/DL
BILIRUB INDIRECT SERPL-MCNC: 0.5 MG/DL
BILIRUB SERPL-MCNC: 0.7 MG/DL
BUN SERPL-MCNC: 15 MG/DL
CALCIUM SERPL-MCNC: 10.2 MG/DL
CHLORIDE SERPL-SCNC: 102 MMOL/L
CO2 SERPL-SCNC: 24 MMOL/L
CREAT SERPL-MCNC: 0.9 MG/DL
EGFRCR SERPLBLD CKD-EPI 2021: 67 ML/MIN/1.73M2
GLUCOSE SERPL-MCNC: 107 MG/DL
HCT VFR BLD CALC: 44 %
HGB BLD-MCNC: 15.4 G/DL
IMM GRANULOCYTES NFR BLD AUTO: 0.3 %
MAN DIFF?: NORMAL
MCHC RBC-ENTMCNC: 29.7 PG
MCHC RBC-ENTMCNC: 35 G/DL
MCV RBC AUTO: 84.9 FL
PLATELET # BLD AUTO: 192 K/UL
PMV BLD AUTO: 0 /100 WBCS
PMV BLD: 10.4 FL
POTASSIUM SERPL-SCNC: 3.4 MMOL/L
PROT SERPL-MCNC: 7.6 G/DL
RBC # BLD: 5.18 M/UL
RBC # FLD: 13.4 %
SODIUM SERPL-SCNC: 141 MMOL/L
WBC # FLD AUTO: 6.46 K/UL

## 2025-04-14 PROCEDURE — 85025 COMPLETE CBC W/AUTO DIFF WBC: CPT

## 2025-04-14 PROCEDURE — G2211 COMPLEX E/M VISIT ADD ON: CPT

## 2025-04-14 PROCEDURE — 99214 OFFICE O/P EST MOD 30 MIN: CPT

## 2025-04-14 PROCEDURE — 82378 CARCINOEMBRYONIC ANTIGEN: CPT

## 2025-04-14 PROCEDURE — 80076 HEPATIC FUNCTION PANEL: CPT

## 2025-04-14 PROCEDURE — 80048 BASIC METABOLIC PNL TOTAL CA: CPT

## 2025-04-14 PROCEDURE — 86300 IMMUNOASSAY TUMOR CA 15-3: CPT

## 2025-04-15 DIAGNOSIS — C50.911 MALIGNANT NEOPLASM OF UNSPECIFIED SITE OF RIGHT FEMALE BREAST: ICD-10-CM

## 2025-04-15 LAB
CANCER AG15-3 SERPL-ACNC: 25.9 U/ML
CEA SERPL-MCNC: 1.9 NG/ML

## 2025-04-17 ENCOUNTER — RESULT REVIEW (OUTPATIENT)
Age: 75
End: 2025-04-17

## 2025-04-17 ENCOUNTER — OUTPATIENT (OUTPATIENT)
Dept: OUTPATIENT SERVICES | Facility: HOSPITAL | Age: 75
LOS: 1 days | End: 2025-04-17
Payer: MEDICARE

## 2025-04-17 DIAGNOSIS — Z90.11 ACQUIRED ABSENCE OF RIGHT BREAST AND NIPPLE: Chronic | ICD-10-CM

## 2025-04-17 DIAGNOSIS — Z12.31 ENCOUNTER FOR SCREENING MAMMOGRAM FOR MALIGNANT NEOPLASM OF BREAST: ICD-10-CM

## 2025-04-17 DIAGNOSIS — Z95.828 PRESENCE OF OTHER VASCULAR IMPLANTS AND GRAFTS: Chronic | ICD-10-CM

## 2025-04-17 DIAGNOSIS — Z90.49 ACQUIRED ABSENCE OF OTHER SPECIFIED PARTS OF DIGESTIVE TRACT: Chronic | ICD-10-CM

## 2025-04-17 PROCEDURE — 77067 SCR MAMMO BI INCL CAD: CPT | Mod: 26,52,LT

## 2025-04-17 PROCEDURE — 77067 SCR MAMMO BI INCL CAD: CPT | Mod: 52

## 2025-04-17 PROCEDURE — 77063 BREAST TOMOSYNTHESIS BI: CPT | Mod: 52

## 2025-04-18 DIAGNOSIS — Z12.31 ENCOUNTER FOR SCREENING MAMMOGRAM FOR MALIGNANT NEOPLASM OF BREAST: ICD-10-CM

## 2025-05-29 ENCOUNTER — RESULT REVIEW (OUTPATIENT)
Age: 75
End: 2025-05-29

## 2025-05-29 ENCOUNTER — OUTPATIENT (OUTPATIENT)
Dept: OUTPATIENT SERVICES | Facility: HOSPITAL | Age: 75
LOS: 1 days | End: 2025-05-29
Payer: MEDICARE

## 2025-05-29 DIAGNOSIS — Z95.828 PRESENCE OF OTHER VASCULAR IMPLANTS AND GRAFTS: Chronic | ICD-10-CM

## 2025-05-29 DIAGNOSIS — Z90.11 ACQUIRED ABSENCE OF RIGHT BREAST AND NIPPLE: Chronic | ICD-10-CM

## 2025-05-29 DIAGNOSIS — Z00.8 ENCOUNTER FOR OTHER GENERAL EXAMINATION: ICD-10-CM

## 2025-05-29 DIAGNOSIS — Z90.49 ACQUIRED ABSENCE OF OTHER SPECIFIED PARTS OF DIGESTIVE TRACT: Chronic | ICD-10-CM

## 2025-05-29 DIAGNOSIS — C18.7 MALIGNANT NEOPLASM OF SIGMOID COLON: ICD-10-CM

## 2025-05-29 PROCEDURE — 71260 CT THORAX DX C+: CPT | Mod: 26

## 2025-05-29 PROCEDURE — 71260 CT THORAX DX C+: CPT

## 2025-05-29 PROCEDURE — 74177 CT ABD & PELVIS W/CONTRAST: CPT | Mod: 26

## 2025-05-29 PROCEDURE — 74177 CT ABD & PELVIS W/CONTRAST: CPT

## 2025-05-30 DIAGNOSIS — C18.7 MALIGNANT NEOPLASM OF SIGMOID COLON: ICD-10-CM

## 2025-06-06 NOTE — CDI QUERY NOTE - NSCDI_DOCCLARIFY2_GEN_ALL_CORE_FT
Documentation clarification is required for accuracy in coding and billing, claim validation and reporting severity of illness, quality data and risk of mortality.
Documentation clarification is required for accuracy in coding and billing, claim validation and reporting severity of illness, quality data and risk of mortality.
Simple: Patient demonstrates quick and easy understanding

## 2025-07-07 NOTE — PHYSICAL EXAM
[Abdomen Masses] : No abdominal masses [Abdomen Tenderness] : ~T No ~M abdominal tenderness 74 [No HSM] : no hepatosplenomegaly [Respiratory Effort] : normal respiratory effort [Normal Rate and Rhythm] : normal rate and rhythm [de-identified] : external examination shows healing incisions.  RUQ stoma site nearly healed. No erythema induration or purulence [de-identified] : awake, alert and in no acute distress

## 2025-08-25 ENCOUNTER — APPOINTMENT (OUTPATIENT)
Dept: CARDIOLOGY | Facility: CLINIC | Age: 75
End: 2025-08-25

## 2025-09-17 ENCOUNTER — APPOINTMENT (OUTPATIENT)
Dept: SURGERY | Facility: CLINIC | Age: 75
End: 2025-09-17

## 2025-09-17 VITALS
WEIGHT: 187 LBS | HEART RATE: 98 BPM | TEMPERATURE: 97 F | OXYGEN SATURATION: 97 % | BODY MASS INDEX: 30.05 KG/M2 | SYSTOLIC BLOOD PRESSURE: 142 MMHG | HEIGHT: 66 IN | DIASTOLIC BLOOD PRESSURE: 90 MMHG

## 2025-09-17 DIAGNOSIS — C18.7 MALIGNANT NEOPLASM OF SIGMOID COLON: ICD-10-CM
